# Patient Record
Sex: FEMALE | Race: WHITE | Employment: FULL TIME | URBAN - NONMETROPOLITAN AREA
[De-identification: names, ages, dates, MRNs, and addresses within clinical notes are randomized per-mention and may not be internally consistent; named-entity substitution may affect disease eponyms.]

---

## 2017-06-08 ENCOUNTER — TELEPHONE (OUTPATIENT)
Dept: OBGYN | Facility: OTHER | Age: 27
End: 2017-06-08

## 2017-06-08 DIAGNOSIS — N91.2 AMENORRHEA: Primary | ICD-10-CM

## 2017-06-09 ENCOUNTER — TELEPHONE (OUTPATIENT)
Dept: OBGYN | Facility: OTHER | Age: 27
End: 2017-06-09

## 2017-06-09 DIAGNOSIS — N91.2 AMENORRHEA: ICD-10-CM

## 2017-06-09 LAB — HCG UR QL: POSITIVE

## 2017-06-09 PROCEDURE — 81025 URINE PREGNANCY TEST: CPT | Performed by: OBSTETRICS & GYNECOLOGY

## 2017-06-09 NOTE — TELEPHONE ENCOUNTER
Positive pregnancy test : yes  LMP : approx may 1 GA: approx 5 1/2 weeks  Prenatal vitamins?: Yes, OTC PNV. Will check folate to be sure 800 mcg or more.  Bleeding?: no  Cramping?: no  1-sided pelvic pain?: no  Advised patient to be seen ASAP if any of the above symptoms.  [facility] appt scheduled with Dr. Dean on 6/20 @ 8:50.

## 2017-06-20 ENCOUNTER — PRENATAL OFFICE VISIT (OUTPATIENT)
Dept: OBGYN | Facility: OTHER | Age: 27
End: 2017-06-20
Attending: OBSTETRICS & GYNECOLOGY
Payer: COMMERCIAL

## 2017-06-20 VITALS — SYSTOLIC BLOOD PRESSURE: 118 MMHG | WEIGHT: 171 LBS | DIASTOLIC BLOOD PRESSURE: 74 MMHG | BODY MASS INDEX: 31.28 KG/M2

## 2017-06-20 DIAGNOSIS — F17.200 TOBACCO USE DISORDER: ICD-10-CM

## 2017-06-20 DIAGNOSIS — Z32.01 PREGNANCY TEST POSITIVE: Primary | ICD-10-CM

## 2017-06-20 DIAGNOSIS — O09.891 SUPERVISION OF OTHER HIGH RISK PREGNANCIES, FIRST TRIMESTER: ICD-10-CM

## 2017-06-20 LAB
ALBUMIN UR-MCNC: 10 MG/DL
AMORPH CRY #/AREA URNS HPF: ABNORMAL /HPF
APPEARANCE UR: ABNORMAL
BACTERIA #/AREA URNS HPF: ABNORMAL /HPF
BILIRUB UR QL STRIP: NEGATIVE
COLOR UR AUTO: YELLOW
ERYTHROCYTE [DISTWIDTH] IN BLOOD BY AUTOMATED COUNT: 12.3 % (ref 10–15)
GLUCOSE UR STRIP-MCNC: NEGATIVE MG/DL
HCT VFR BLD AUTO: 43.4 % (ref 35–47)
HGB BLD-MCNC: 15.1 G/DL (ref 11.7–15.7)
HGB UR QL STRIP: NEGATIVE
KETONES UR STRIP-MCNC: NEGATIVE MG/DL
LEUKOCYTE ESTERASE UR QL STRIP: NEGATIVE
MCH RBC QN AUTO: 30.4 PG (ref 26.5–33)
MCHC RBC AUTO-ENTMCNC: 34.8 G/DL (ref 31.5–36.5)
MCV RBC AUTO: 87 FL (ref 78–100)
MUCOUS THREADS #/AREA URNS LPF: PRESENT /LPF
NITRATE UR QL: NEGATIVE
PH UR STRIP: 8 PH (ref 4.7–8)
PLATELET # BLD AUTO: 229 10E9/L (ref 150–450)
RBC # BLD AUTO: 4.97 10E12/L (ref 3.8–5.2)
RBC #/AREA URNS AUTO: 0 /HPF (ref 0–2)
SP GR UR STRIP: 1.02 (ref 1–1.03)
URN SPEC COLLECT METH UR: ABNORMAL
UROBILINOGEN UR STRIP-MCNC: NORMAL MG/DL (ref 0–2)
WBC # BLD AUTO: 9.3 10E9/L (ref 4–11)
WBC #/AREA URNS AUTO: 0 /HPF (ref 0–2)

## 2017-06-20 PROCEDURE — 87591 N.GONORRHOEAE DNA AMP PROB: CPT | Mod: 90 | Performed by: OBSTETRICS & GYNECOLOGY

## 2017-06-20 PROCEDURE — 36415 COLL VENOUS BLD VENIPUNCTURE: CPT | Performed by: OBSTETRICS & GYNECOLOGY

## 2017-06-20 PROCEDURE — 87491 CHLMYD TRACH DNA AMP PROBE: CPT | Mod: 90 | Performed by: OBSTETRICS & GYNECOLOGY

## 2017-06-20 PROCEDURE — 99000 SPECIMEN HANDLING OFFICE-LAB: CPT | Performed by: OBSTETRICS & GYNECOLOGY

## 2017-06-20 PROCEDURE — 76817 TRANSVAGINAL US OBSTETRIC: CPT | Performed by: OBSTETRICS & GYNECOLOGY

## 2017-06-20 PROCEDURE — 85027 COMPLETE CBC AUTOMATED: CPT | Performed by: OBSTETRICS & GYNECOLOGY

## 2017-06-20 PROCEDURE — 99207 ZZC COMPLICATED OB VISIT: CPT | Performed by: OBSTETRICS & GYNECOLOGY

## 2017-06-20 PROCEDURE — 81001 URINALYSIS AUTO W/SCOPE: CPT | Performed by: OBSTETRICS & GYNECOLOGY

## 2017-06-20 PROCEDURE — 88142 CYTOPATH C/V THIN LAYER: CPT | Performed by: OBSTETRICS & GYNECOLOGY

## 2017-06-20 RX ORDER — PRENATAL VIT/IRON FUM/FOLIC AC 27MG-0.8MG
1 TABLET ORAL DAILY
COMMUNITY
End: 2018-08-23

## 2017-06-20 ASSESSMENT — PAIN SCALES - GENERAL: PAINLEVEL: NO PAIN (0)

## 2017-06-20 NOTE — LETTER
[facility] [place]  [address]  [place] [address]  [phone]      June 27, 2017      Arabella GILES Desai  [facility] [address] [address]        Dear Arabella,      Thank you for coming to the [facility]. This letter is to inform you that your pap test was normal. Please call the nurse at [phone] if you have questions pertaining to your results.           Sincerely,    Kayla Dean MD/Karmen SERRANO LPN

## 2017-06-20 NOTE — NURSING NOTE
"Chief Complaint   Patient presents with     Prenatal Care     7 Weeks, 2 Days       Initial /74 (Cuff Size: Adult Regular)  Wt 171 lb (77.6 kg)  BMI 31.28 kg/m2 Estimated body mass index is 31.28 kg/(m^2) as calculated from the following:    Height as of 8/31/16: 5' 2\" (1.575 m).    Weight as of this encounter: 171 lb (77.6 kg).  Medication Reconciliation: bette Gee      "

## 2017-06-20 NOTE — MR AVS SNAPSHOT
After Visit Summary   6/20/2017    Arabella Desai    MRN: [id number]           Patient Information     Date Of Birth          1990        Visit Information        Provider Department      6/20/2017 8:50 AM Kayla Dean MD [facility] [place]        Today's Diagnoses     Pregnancy test positive    -  1      Care Instructions    Pap; Gonorrhea and Chlamydia testing done today.  Lab today.    Return in 2 weeks for a short appointment, and in 4-6 weeks for a long New OB appointment.  Please call [phone] right away if you need to reschedule your long New OB visit. These appointment slots fill quickly.       1st trimester screening blood work and nuchal translucency ultrasound (screen for Down Syndrome, Trisomy 13/18) ultrasound @ 11 2/7-13 6/7 weeks. You will receive a call from ultrasound to schedule this appt. Please call the nurse at [phone] if you are not contacted by 12  weeks with an appointment time.    MSAFP blood test in lab (screen for open neural tube defects) @ approximately 16 weeks. (can not be drawn any earlier than 15 weeks)    Level 2 ultrasound @ 18-22 weeks. You will receive a call from ultrasound to make this appt. Please contact the nurse if you have not been contacted with an appointment time by 16 weeks. If you need to reschedule your nuchal translucency ultrasound or Level 2 ultrasound; please call ultrasound immediately as these appointment times fill quickly.                   Follow-ups after your visit        Your next 10 appointments already scheduled     Aug 21, 2017  2:00 PM CDT   (Arrive by 1:45 PM)   PHYSICAL with MD Augie [facility] [place] [place] ([facility] )    [address] [place] [address]   [phone]              Who to contact     If you have questions or need follow up information about today's clinic visit or your schedule please contact [facility] directly at [phone].  Normal  "or non-critical lab and imaging results will be communicated to you by MyChart, letter or phone within 4 business days after the clinic has received the results. If you do not hear from us within 7 days, please contact the clinic through [employer] or phone. If you have a critical or abnormal lab result, we will notify you by phone as soon as possible.  Submit refill requests through [employer] or call your pharmacy and they will forward the refill request to us. Please allow 3 business days for your refill to be completed.          Additional Information About Your Visit        [employer] Information     Red-rabbit lets you send messages to your doctor, view your test results, renew your prescriptions, schedule appointments and more. To sign up, go to [email or web address] . Click on \"Log in\" on the left side of the screen, which will take you to the Welcome page. Then click on \"Sign up Now\" on the right side of the page.     You will be asked to enter the access code listed below, as well as some personal information. Please follow the directions to create your username and password.     Your access code is: [email or web address]  Expires: 2017  9:05 AM     Your access code will  in 90 days. If you need help or a new code, please call your [place] clinic or [phone].        Care EveryWhere ID     This is your Care EveryWhere ID. This could be used by other organizations to access your [place] medical records  [id number]        Your Vitals Were     Last Period BMI (Body Mass Index)                2017 (Approximate) 31.28 kg/m2           Blood Pressure from Last 3 Encounters:   17 118/74   16 118/72   16 104/70    Weight from Last 3 Encounters:   17 171 lb (77.6 kg)   16 150 lb (68 kg)   16 150 lb (68 kg)              We Performed the Following     US OB (Clinic Only)        Primary Care Provider Office Phone # Fax #    NITO Monroy [phone]       " [facility] [address] [place] [address]        Thank you!     Thank you for choosing [facility]  for your care. Our goal is always to provide you with excellent care. Hearing back from our patients is one way we can continue to improve our services. Please take a few minutes to complete the written survey that you may receive in the mail after your visit with us. Thank you!             Your Updated Medication List - Protect others around you: Learn how to safely use, store and throw away your medicines at www.disposemymeds.org.          This list is accurate as of: 6/20/17  9:05 AM.  Always use your most recent med list.                   Brand Name Dispense Instructions for use    prenatal multivitamin  plus iron 27-0.8 MG Tabs per tablet      Take 1 tablet by mouth daily

## 2017-06-20 NOTE — PROGRESS NOTES
Here for doc of dates and viability  /74 (Cuff Size: Adult Regular)  Wt 171 lb (77.6 kg)  BMI 31.28 kg/m2      Uterus: normal  Gest. Sac: reg  Yolk Sac: p  Fetal Pole: 6w6d  FHT: +  Fluid: normal  Adnexa: no masses  Gest Age: 6w6d  Sc = dates  Done by Kayla Dean MD    A: IUP    P: rto 2 wks  rto NOB  11-13 wk NT etc.  Pap,gc,ct done  Hp,mscc with micro      Greater than  25 minutes were spent counseling this patient face to face in addition to the ultrasound.

## 2017-06-20 NOTE — PATIENT INSTRUCTIONS
Pap; Gonorrhea and Chlamydia testing done today.  Lab today.    Return in 2 weeks for a short appointment, and in 4-6 weeks for a long New OB appointment.  Please call [phone] right away if you need to reschedule your long New OB visit. These appointment slots fill quickly.       1st trimester screening blood work and nuchal translucency ultrasound (screen for Down Syndrome, Trisomy 13/18) ultrasound @ 11 2/7-13 6/7 weeks. You will receive a call from ultrasound to schedule this appt. Please call the nurse at [phone] if you are not contacted by 12  weeks with an appointment time.    MSAFP blood test in lab (screen for open neural tube defects) @ approximately 16 weeks. (can not be drawn any earlier than 15 weeks)    Level 2 ultrasound @ 18-22 weeks. You will receive a call from ultrasound to make this appt. Please contact the nurse if you have not been contacted with an appointment time by 16 weeks. If you need to reschedule your nuchal translucency ultrasound or Level 2 ultrasound; please call ultrasound immediately as these appointment times fill quickly.

## 2017-06-21 LAB
C TRACH DNA SPEC QL NAA+PROBE: NORMAL
N GONORRHOEA DNA SPEC QL NAA+PROBE: NORMAL
SPECIMEN SOURCE: NORMAL
SPECIMEN SOURCE: NORMAL

## 2017-06-23 ASSESSMENT — ANXIETY QUESTIONNAIRES
1. FEELING NERVOUS, ANXIOUS, OR ON EDGE: SEVERAL DAYS
2. NOT BEING ABLE TO STOP OR CONTROL WORRYING: NOT AT ALL
7. FEELING AFRAID AS IF SOMETHING AWFUL MIGHT HAPPEN: NOT AT ALL
GAD7 TOTAL SCORE: 2
5. BEING SO RESTLESS THAT IT IS HARD TO SIT STILL: NOT AT ALL
IF YOU CHECKED OFF ANY PROBLEMS ON THIS QUESTIONNAIRE, HOW DIFFICULT HAVE THESE PROBLEMS MADE IT FOR YOU TO DO YOUR WORK, TAKE CARE OF THINGS AT HOME, OR GET ALONG WITH OTHER PEOPLE: NOT DIFFICULT AT ALL
3. WORRYING TOO MUCH ABOUT DIFFERENT THINGS: NOT AT ALL
6. BECOMING EASILY ANNOYED OR IRRITABLE: SEVERAL DAYS

## 2017-06-23 ASSESSMENT — PATIENT HEALTH QUESTIONNAIRE - PHQ9: 5. POOR APPETITE OR OVEREATING: NOT AT ALL

## 2017-06-24 ASSESSMENT — PATIENT HEALTH QUESTIONNAIRE - PHQ9: SUM OF ALL RESPONSES TO PHQ QUESTIONS 1-9: 3

## 2017-06-24 ASSESSMENT — ANXIETY QUESTIONNAIRES: GAD7 TOTAL SCORE: 2

## 2017-06-27 LAB
COPATH REPORT: NORMAL
PAP: NORMAL

## 2017-07-10 ENCOUNTER — PRENATAL OFFICE VISIT (OUTPATIENT)
Dept: OBGYN | Facility: OTHER | Age: 27
End: 2017-07-10
Attending: OBSTETRICS & GYNECOLOGY
Payer: COMMERCIAL

## 2017-07-10 VITALS
BODY MASS INDEX: 31.28 KG/M2 | WEIGHT: 170 LBS | HEIGHT: 62 IN | DIASTOLIC BLOOD PRESSURE: 66 MMHG | SYSTOLIC BLOOD PRESSURE: 118 MMHG

## 2017-07-10 DIAGNOSIS — Z23 NEED FOR VACCINATION: ICD-10-CM

## 2017-07-10 DIAGNOSIS — F17.200 SMOKES AND MOTIVATED TO QUIT: ICD-10-CM

## 2017-07-10 DIAGNOSIS — Z34.01 ENCOUNTER FOR SUPERVISION OF NORMAL FIRST PREGNANCY IN FIRST TRIMESTER: ICD-10-CM

## 2017-07-10 DIAGNOSIS — O09.891 SUPERVISION OF OTHER HIGH RISK PREGNANCIES, FIRST TRIMESTER: Primary | ICD-10-CM

## 2017-07-10 DIAGNOSIS — H10.029 PINK EYE, UNSPECIFIED LATERALITY: ICD-10-CM

## 2017-07-10 DIAGNOSIS — F17.200 TOBACCO USE DISORDER: ICD-10-CM

## 2017-07-10 PROCEDURE — 99207 ZZC FIRST OB VISIT: CPT | Mod: 25 | Performed by: OBSTETRICS & GYNECOLOGY

## 2017-07-10 PROCEDURE — 90732 PPSV23 VACC 2 YRS+ SUBQ/IM: CPT | Performed by: OBSTETRICS & GYNECOLOGY

## 2017-07-10 PROCEDURE — 76817 TRANSVAGINAL US OBSTETRIC: CPT | Performed by: OBSTETRICS & GYNECOLOGY

## 2017-07-10 PROCEDURE — 90471 IMMUNIZATION ADMIN: CPT | Performed by: OBSTETRICS & GYNECOLOGY

## 2017-07-10 RX ORDER — NICOTINE 21 MG/24HR
1 PATCH, TRANSDERMAL 24 HOURS TRANSDERMAL EVERY 24 HOURS
Qty: 30 PATCH | Refills: 1 | Status: SHIPPED | OUTPATIENT
Start: 2017-07-10 | End: 2018-02-14

## 2017-07-10 RX ORDER — GENTAMICIN SULFATE 3 MG/ML
1 SOLUTION/ DROPS OPHTHALMIC EVERY 4 HOURS
COMMUNITY
End: 2017-07-10

## 2017-07-10 RX ORDER — GENTAMICIN SULFATE 3 MG/ML
1 SOLUTION/ DROPS OPHTHALMIC EVERY 4 HOURS
Qty: 5 ML | Refills: 0 | Status: SHIPPED | OUTPATIENT
Start: 2017-07-10 | End: 2017-08-08

## 2017-07-10 NOTE — PROGRESS NOTES
Immunization History   Administered Date(s) Administered     DTAP (<7y) 1990, 1990, 1990, 08/14/1991, 02/06/1995     HPVQuadrivalent 06/28/2007, 08/28/2007, 12/24/2007     HepB-Peds 10/11/1999, 11/11/1999, 03/12/2000     Influenza Vaccine IM 3yrs+ 4 Valent IIV4 10/21/2013     Influenza vaccine ages 6-35 months 09/12/2011     MMR 05/09/1991, 06/04/2002     Meningococcal (Menactra ) 06/28/2007     Pneumococcal 23 valent 07/10/2017     Poliovirus, inactivated (IPV) 1990, 1990, 08/14/1991, 02/06/1995     TD (ADULT, 7+) 06/04/2002     TDAP Vaccine (Boostrix) 04/03/2012, 04/26/2016     Varicella 10/11/1999     Pneumovax done. Declines Quitline referral.    12 Week Visit    Patient education provided on the following:  Benefits of breastfeeding  Importance of early skin-to-skin contact    We reviewed the [place] [employer] Prenatal Toolkit in the [facility] Resource Book.  Patient questions and concerns addressed and reviewed. Support and encouragement provided.    VERONICA CEDEÑO

## 2017-07-10 NOTE — PATIENT INSTRUCTIONS
1st trimester screening blood work and nuchal translucency ultrasound (screen for Down Syndrome, Trisomy 13/18) ultrasound @ 11 2/7-13 6/7 weeks. Scheduled for 7/24/17.    MSAFP blood test in lab (screen for open neural tube defects) @ approximately 16 weeks. (can not be drawn any earlier than 15 weeks)    Level 2 ultrasound @ 18-22 weeks. Scheduled for 9/26/17. If you need to reschedule your nuchal translucency ultrasound or Level 2 ultrasound; please call ultrasound immediately as these appointment times fill quickly.     Pneumonia vaccine recommended now.  Tdap vaccine at 27 weeks.  Flu shot in fall.    Return to clinic in 4 weeks.    Try to quit smoking. Referral to Quitline Counselor offered.

## 2017-07-10 NOTE — PROGRESS NOTES
"NEW OB VISIT  Arabella Desai is a 27 year old  at 10w0d presenting for a new ob visit.      Currently taking PNV? Gentamycin eye and pnv  Other meds: n    MEDICAL HISTORY:  Diabetes: n  Hypertension: n  Heart Disease: n  Autoimmune disorder: n  Kidney Disease/UTI: n  Neurologic Disease/Epilepsy: n  Psychiatric Disease: n  Depression/Postpartum Depression: with wily  Varicositites/Phlebitis: n  Hepatitis/Liver Disease: n  Thyroid Dysfunction: n  Trauma/Violence: n  History of Blood Transfusion: n  Tobacco Use: y  Alcohol Use: n  Illicit/Recreational Drugs: n  D (Rh Sensitized): n  Pulmonary Disease (TB/Asthma): n  Drug/Latex Allergies/Reactions: rash with latex  Breast: n  GYN Surgery: n  Operations/Hospitalizations: tonsils  Anesthetic Complications: n  History of Abnormal Pap: n  Uterine Anomalies/ARSH: n  Infertility: n  ART Treatment: n  Relevant Family History: n   Other/Comments: n    INFECTION HISTORY:  Live with someone with TB or exposed to TB: father with infusions  Patient or Partner has history of Genital Herpes: n  Rash or viral illness or fever since LMP: n pink eye?  Hepatitis B or C: n  History of STI (Gonorrhea, Chlamydia, HPV, HIV, Syphilis): n  Zika exposure n   Other: n  Cats y    BABY DOC: AMc             Breast feeding: y  RD y      IMMUNIZATION HISTORY:  Chicken Pox: y  Flu Vaccine:  In fall  Pneumococcal if smoker or RAD:  y  Tdap: in 3  Gardasil: n  Other/comments: n    FAMILY HISTORY  Diabetes: mgm  Hypertension: n  CVA/Stroke: n  Lupus: n  Cancers: Breast  n ovarian n,colon n,uterine: n           Genetics Screening/Teratology Counseling:  Includes Patient, Baby's Father, or anyone in either family with:  Patient's age 35 years or older as of estimated date of delivery:  n  Thalassemia: MCV less than 80: n  Neural Tube defects: n  Congenital Heart Defects: n  Down syndrome: fob niece,fob pu \"special\"  Gage-Sachs: n  Canavan Disease: n  Familial Dysautonomia: n  Sickle Cell Disease " "or Trait: n  Hemophilia or other blood disorders: n  Muscular Dystrophy: n  Cystic Fibrosis: n  [place]'s Chorea: n  Mental Retardation or Autism: n  Other genetic or chromosomal disorders: n  Maternal Metabolic Disorder (Type 1 DM, PKU): n  Patient or baby's father with birth defects not listed above: n  Recurrent pregnancy loss or stillbirth: n  Medications (Supplements, drugs)/ Illicit/ Recreational drugs/ Alcohol since LMP: n  Other/Comments: n    Review Of Systems: \"pink eye\"  C:     NEGATIVE for fever, chills, change in weight  I:       NEGATIVE for worrisome rashes, moles or lesions  E:     NEGATIVE for vision changes or irritation  E/M: NEGATIVE for ear, mouth and throat problems  R:     NEGATIVE for significant cough or SOB  CV:   NEGATIVE for chest pain, palpitations or peripheral edema  GI:     NEGATIVE for unusual nausea, abdominal pain, heartburn, or change in bowel   :   NEGATIVE for frequency, dysuria, hematuria, vaginal discharge or bleeding  M:     NEGATIVE for significant arthralgias or myalgia  N:      NEGATIVE for weakness, dizziness or paresthesias  E:      NEGATIVE for temperature intolerance, skin/hair changes  P:      NEGATIVE for changes in mood or affect.     PHYSICAL EXAM:   /66  Ht 5' 2\" (1.575 m)  Wt 170 lb (77.1 kg)  LMP 05/01/2017 (Approximate)  BMI 31.09 kg/m2   BMI: Body mass index is 31.09 kg/(m^2).  Constitutional: healthy, alert and no distress  Head: Normocephalic. No masses, lesions, tenderness or abnormalities  Neck: Neck supple. Trachea midline. No adenopathy. Thyroid symmetric, normal size.   Cardiovascular: RRR.   Respiratory: lungs clear   Breast: Breasts reveal mild symmetric fibrocystic densities, but there are no dominant, discrete, fixed or suspicious masses found.  Gastrointestinal: Abdomen soft, non-tender, non-distended. No masses, organomegaly.  Pelvic:  Vulva:  No external lesions, normal female hair distribution, no inguinal adenopathy.    Urethra:  " Midline, non-tender, well supported, no discharge  Vagina:  Moist, pink, no abnormal discharge, no lesions  Uterus:   gravid , non-tender  Ovaries:  No masses appreciated  Rectal Exam: deferred    Musculoskeletal: extremities normal  Skin: no suspicious lesions or rashes  Psychiatric: Affect appropriate, cooperative,mentation appears normal.     Risk assessment done. Level is   low    ASSESSMENT:   IUP    PLAN:  Prenatal labs done  11-13 weeks 1st trimester NT/Bloodwork scheduled  16 wk MSAFP  Cell free DNA discussed  Level II Ultrasound at 20 weeks   Tdap at 27 weeks  Check MIIC  Flu shot in fall  RTO 4 wks    Greater than 25 were spent in face to face counseling and interview by me for this initial new ob visit.  Kayla Dean MD

## 2017-07-10 NOTE — MR AVS SNAPSHOT
After Visit Summary   7/10/2017    Arabella Desai    MRN: [id number]           Patient Information     Date Of Birth          1990        Visit Information        Provider Department      7/10/2017 9:00 AM Kayla Dean MD [facility]        Today's Diagnoses     Supervision of other high risk pregnancies, first trimester    -  1    Encounter for supervision of normal first pregnancy in first trimester        Smokes and motivated to quit        Need for vaccination          Care Instructions    1st trimester screening blood work and nuchal translucency ultrasound (screen for Down Syndrome, Trisomy 13/18) ultrasound @ 11 2/7-13 6/7 weeks. Scheduled for 7/24/17.    MSAFP blood test in lab (screen for open neural tube defects) @ approximately 16 weeks. (can not be drawn any earlier than 15 weeks)    Level 2 ultrasound @ 18-22 weeks. Scheduled for 9/26/17. If you need to reschedule your nuchal translucency ultrasound or Level 2 ultrasound; please call ultrasound immediately as these appointment times fill quickly.     Pneumonia vaccine recommended now.  Tdap vaccine at 27 weeks.  Flu shot in fall.    Return to clinic in 4 weeks.    Try to quit smoking. Referral to Quitline Counselor offered.                  Follow-ups after your visit        Your next 10 appointments already scheduled     Jul 24, 2017  1:00 PM CDT   Radiology with [facility] ULTRASOUND 2   [facility] Ultrasound ([facility] )    [address] [place] [address]   [phone]            Aug 21, 2017  2:00 PM CDT   (Arrive by 1:45 PM)   [facility]YS[facility]   [facility] )    [facility] [address]   [phone]            Sep 26, 2017  8:00 AM CDT   Radiology with HI ULTRASOUND 2   [facility] Ultrasound ([facility] )    [address] [place] [address]   [phone]              Who to contact     If you have questions or need  "follow up information about today's clinic visit or your schedule please contact [facility] directly at [phone].  Normal or non-critical lab and imaging results will be communicated to you by MyChart, letter or phone within 4 business days after the clinic has received the results. If you do not hear from us within 7 days, please contact the clinic through [email or web address] or phone. If you have a critical or abnormal lab result, we will notify you by phone as soon as possible.  Submit refill requests through [email or web address] or call your pharmacy and they will forward the refill request to us. Please allow 3 business days for your refill to be completed.          Additional Information About Your Visit        [email or web address] Information     [email or web address] lets you send messages to your doctor, view your test results, renew your prescriptions, schedule appointments and more. To sign up, go to [email or web address] . Click on \"Log in\" on the left side of the screen, which will take you to the Welcome page. Then click on \"Sign up Now\" on the right side of the page.     You will be asked to enter the access code listed below, as well as some personal information. Please follow the directions to create your username and password.     Your access code is: [email or web address]  Expires: 2017  9:05 AM     Your access code will  in 90 days. If you need help or a new code, please call your [place] clinic or [phone].        Care EveryWhere ID     This is your Care EveryWhere ID. This could be used by other organizations to access your [place] medical records  [id number]        Your Vitals Were     Height Last Period BMI (Body Mass Index)             5' 2\" (1.575 m) 2017 (Approximate) 31.09 kg/m2          Blood Pressure from Last 3 Encounters:   07/10/17 118/66   17 118/74   16 118/72    Weight from Last 3 Encounters:   07/10/17 170 lb (77.1 kg)   17 171 lb (77.6 kg)   16 150 lb (68 kg)            "   We Performed the Following     ADMIN: Vaccine, Initial (05821)     Pneumococcal vaccine 23 valent PPSV23  (Pneumovax) [51923]     US OB (Clinic Only)        Primary Care Provider Office Phone # Fax #    Katherine NITO Dunaway [phone]       [facility] [address]  [place] [address]        Equal Access to Services     [facility] Center[facility]YAN : Hadii aad ku hadasho Soomaali, waaxda luqadaha, qaybta kaalmada adeegyada, waxay idiin hayaan adeeg kharash la'aan . So [facility] [phone].    ATENCIÓN: Si habla español, tiene a boston disposición servicios gratuitos de asistencia lingüística. Kady al [phone].    We comply with applicable federal civil rights laws and [place] laws. We do not discriminate on the basis of race, color, national origin, age, disability sex, sexual orientation or gender identity.            Thank you!     Thank you for choosing [facility] [place]  for your care. Our goal is always to provide you with excellent care. Hearing back from our patients is one way we can continue to improve our services. Please take a few minutes to complete the written survey that you may receive in the mail after your visit with us. Thank you!             Your Updated Medication List - Protect others around you: Learn how to safely use, store and throw away your medicines at www.disposemymeds.org.          This list is accurate as of: 7/10/17  9:43 AM.  Always use your most recent med list.                   Brand Name Dispense Instructions for use Diagnosis    gentamicin 0.3 % ophthalmic solution    GARAMYCIN     Place 1 drop into both eyes every 4 hours        prenatal multivitamin  plus iron 27-0.8 MG Tabs per tablet      Take 1 tablet by mouth daily

## 2017-07-24 ENCOUNTER — HOSPITAL ENCOUNTER (OUTPATIENT)
Dept: ULTRASOUND IMAGING | Facility: HOSPITAL | Age: 27
Discharge: HOME | End: 2017-07-24
Attending: OBSTETRICS & GYNECOLOGY | Admitting: OBSTETRICS & GYNECOLOGY
Payer: COMMERCIAL

## 2017-07-24 PROCEDURE — 36415 COLL VENOUS BLD VENIPUNCTURE: CPT

## 2017-07-24 PROCEDURE — 76813 OB US NUCHAL MEAS 1 GEST: CPT | Mod: TC

## 2017-07-25 PROBLEM — R93.89 ABNORMAL ULTRASOUND: Status: ACTIVE | Noted: 2017-07-25

## 2017-07-28 DIAGNOSIS — R93.89 ABNORMAL ULTRASOUND: ICD-10-CM

## 2017-07-28 DIAGNOSIS — O28.3 INCREASED NUCHAL TRANSLUCENCY SPACE ON FETAL ULTRASOUND: ICD-10-CM

## 2017-07-28 DIAGNOSIS — O28.3 INCREASED NUCHAL TRANSLUCENCY SPACE ON FETAL ULTRASOUND: Primary | ICD-10-CM

## 2017-07-28 PROCEDURE — 99000 SPECIMEN HANDLING OFFICE-LAB: CPT | Performed by: OBSTETRICS & GYNECOLOGY

## 2017-07-28 PROCEDURE — 36415 COLL VENOUS BLD VENIPUNCTURE: CPT | Performed by: OBSTETRICS & GYNECOLOGY

## 2017-08-07 ENCOUNTER — OFFICE VISIT (OUTPATIENT)
Dept: FAMILY MEDICINE | Facility: OTHER | Age: 27
End: 2017-08-07
Attending: FAMILY MEDICINE
Payer: COMMERCIAL

## 2017-08-07 VITALS
TEMPERATURE: 97.9 F | WEIGHT: 162 LBS | DIASTOLIC BLOOD PRESSURE: 58 MMHG | HEART RATE: 95 BPM | HEIGHT: 62 IN | OXYGEN SATURATION: 98 % | SYSTOLIC BLOOD PRESSURE: 108 MMHG | BODY MASS INDEX: 29.81 KG/M2

## 2017-08-07 DIAGNOSIS — H10.13 ALLERGIC CONJUNCTIVITIS, BILATERAL: Primary | ICD-10-CM

## 2017-08-07 DIAGNOSIS — R05.9 COUGH: ICD-10-CM

## 2017-08-07 PROCEDURE — 99213 OFFICE O/P EST LOW 20 MIN: CPT | Performed by: FAMILY MEDICINE

## 2017-08-07 RX ORDER — OLOPATADINE HYDROCHLORIDE 1 MG/ML
1 SOLUTION/ DROPS OPHTHALMIC 2 TIMES DAILY
Qty: 1 BOTTLE | Refills: 3 | Status: SHIPPED | OUTPATIENT
Start: 2017-08-07 | End: 2017-09-06

## 2017-08-07 RX ORDER — LORATADINE 10 MG/1
10 TABLET ORAL DAILY
Qty: 30 TABLET | Refills: 3 | Status: SHIPPED | OUTPATIENT
Start: 2017-08-07 | End: 2017-09-06

## 2017-08-07 ASSESSMENT — PATIENT HEALTH QUESTIONNAIRE - PHQ9
SUM OF ALL RESPONSES TO PHQ QUESTIONS 1-9: 2
5. POOR APPETITE OR OVEREATING: NOT AT ALL

## 2017-08-07 ASSESSMENT — ANXIETY QUESTIONNAIRES
5. BEING SO RESTLESS THAT IT IS HARD TO SIT STILL: NOT AT ALL
2. NOT BEING ABLE TO STOP OR CONTROL WORRYING: NOT AT ALL
7. FEELING AFRAID AS IF SOMETHING AWFUL MIGHT HAPPEN: NOT AT ALL
GAD7 TOTAL SCORE: 0
6. BECOMING EASILY ANNOYED OR IRRITABLE: NOT AT ALL
1. FEELING NERVOUS, ANXIOUS, OR ON EDGE: NOT AT ALL
3. WORRYING TOO MUCH ABOUT DIFFERENT THINGS: NOT AT ALL

## 2017-08-07 ASSESSMENT — PAIN SCALES - GENERAL: PAINLEVEL: NO PAIN (1)

## 2017-08-07 NOTE — PROGRESS NOTES
SUBJECTIVE:  Arabella is a 27 year old female who comes in today for eye symptoms for past month, burning, dry, some crusting in the morning.  Has tried Gentamicin without relief.  Had similar symptoms last month.  Started this time at outdoor wedding, camping.  Has been coughing this past week.  Productive, phlegm.  Some wheezing at night.  No fevers.  No rash.  No rhinorrhea, but some post nasal drip.      Current Outpatient Prescriptions   Medication     loratadine (CLARITIN) 10 MG tablet     olopatadine (PATANOL) 0.1 % ophthalmic solution     nicotine (NICODERM CQ) 14 MG/24HR 24 hr patch     gentamicin (GARAMYCIN) 0.3 % ophthalmic solution     Prenatal Vit-Fe Fumarate-FA (PRENATAL MULTIVITAMIN  PLUS IRON) 27-0.8 MG TABS per tablet     [DISCONTINUED] docusate sodium (COLACE) 100 MG capsule     No current facility-administered medications for this visit.         Allergies   Allergen Reactions     Latex Rash       Past Medical History:   Diagnosis Date     Chronic interstitial cystitis 8/12/2011     Tobacco use disorder 5/17/2012     Past Surgical History:   Procedure Laterality Date     CYSTOSCOPY       Social History     Social History     Marital status:      Spouse name: N/A     Number of children: N/A     Years of education: N/A     Occupational History     [employer]      Social History Main Topics     Smoking status: Former Smoker     Packs/day: 0.50     Types: Cigarettes     Smokeless tobacco: Never Used      Comment: RX for patches done. declines Quitline referral     Alcohol use No      Comment: occasional     Drug use: No     Sexual activity: Yes     Partners: Male     Other Topics Concern      Service No     Blood Transfusions Yes     Permits if needed     Caffeine Concern No     Occupational Exposure No     Hobby Hazards No     Sleep Concern No     Stress Concern No     Weight Concern No     Special Diet No     Back Care No     Exercise No     Seat Belt Yes     Self-Exams Yes      "Parent/Sibling W/ Cabg, Mi Or Angioplasty Before 65f 55m? No     Social History Narrative       ROS:  General: negative for, fever, chills  Skin: negative for, rash  Eyes: positive for as above, redness, tearing, itching, negative for, visual blurring, eye pain  ENT: negative for, ear pain bilaterally, sinus congestion, sore throat  Resp: Cough- as above  CV: negative for and chest pain  GI: negative    OBJECTIVE:  Vitals:    08/07/17 1359   BP: 108/58   BP Location: Left arm   Patient Position: Chair   Cuff Size: Adult Regular   Pulse: 95   Temp: 97.9  F (36.6  C)   TempSrc: Tympanic   SpO2: 98%   Weight: 162 lb (73.5 kg)   Height: 5' 1.5\" (1.562 m)     GENERAL APPEARANCE: healthy, alert and no distress  EYES: PERRL, EOMI, conjunctiva injected, right slightly more than left; normal fundoscopic exam  HENT: ear canals and TM's normal and nose and mouth without ulcers or lesions  NECK: no adenopathy, no asymmetry, masses, or scars and thyroid normal to palpation  RESP: lungs clear to auscultation - no rales, rhonchi or wheezes; occasional dry cough  CV: regular rates and rhythm, normal S1 S2, no S3 or S4 and no murmur, click or rub -  SKIN: no suspicious lesions or rashes  PSYCH: mentation appears normal. and affect normal/bright    ASSESSMENT/ORDERS:    ICD-10-CM    1. Allergic conjunctivitis, bilateral H10.13 loratadine (CLARITIN) 10 MG tablet     olopatadine (PATANOL) 0.1 % ophthalmic solution   2. Cough R05 loratadine (CLARITIN) 10 MG tablet     PLAN:  Seems to be more allergy/environmental.  Did not respond to antibiotic drops.  See below.    Patient Instructions   Take Claritin nightly.  Use eye drops twice daily.  Can use artificial tears or gel to lubricate as needed as well.  Saline sinus rinses.  Call if not improving.        Anayeli San    "

## 2017-08-07 NOTE — NURSING NOTE
"Chief Complaint   Patient presents with     Eye Problem      Both eyes. How long 1 month. Has used gentamycin drops. 1/10. Burning sensation. Dry eyes. Gets goopy/crusty eyes in the morning.      Cough     1 week. no medication for this. 1/10. Coughing up mucus and flem.        Initial /58 (BP Location: Left arm, Patient Position: Chair, Cuff Size: Adult Regular)  Pulse 95  Temp 97.9  F (36.6  C) (Tympanic)  Ht 5' 1.5\" (1.562 m)  Wt 162 lb (73.5 kg)  LMP 05/01/2017 (Approximate)  SpO2 98%  BMI 30.11 kg/m2 Estimated body mass index is 30.11 kg/(m^2) as calculated from the following:    Height as of this encounter: 5' 1.5\" (1.562 m).    Weight as of this encounter: 162 lb (73.5 kg).  Medication Reconciliation: complete   Arpita Holly LPN    "

## 2017-08-07 NOTE — PATIENT INSTRUCTIONS
Take Claritin nightly.  Use eye drops twice daily.  Can use artificial tears or gel to lubricate as needed as well.  Saline sinus rinses.  Call if not improving.

## 2017-08-08 ENCOUNTER — PRENATAL OFFICE VISIT (OUTPATIENT)
Dept: OBGYN | Facility: OTHER | Age: 27
End: 2017-08-08
Attending: OBSTETRICS & GYNECOLOGY
Payer: COMMERCIAL

## 2017-08-08 VITALS — WEIGHT: 169 LBS | BODY MASS INDEX: 31.42 KG/M2 | SYSTOLIC BLOOD PRESSURE: 120 MMHG | DIASTOLIC BLOOD PRESSURE: 78 MMHG

## 2017-08-08 DIAGNOSIS — R93.89 ABNORMAL ULTRASOUND: ICD-10-CM

## 2017-08-08 DIAGNOSIS — O09.891 SUPERVISION OF OTHER HIGH RISK PREGNANCIES, FIRST TRIMESTER: Primary | ICD-10-CM

## 2017-08-08 PROBLEM — J30.1 CHRONIC SEASONAL ALLERGIC RHINITIS DUE TO POLLEN: Status: ACTIVE | Noted: 2017-08-08

## 2017-08-08 PROCEDURE — 99207 ZZC PRENATAL VISIT: CPT | Performed by: OBSTETRICS & GYNECOLOGY

## 2017-08-08 ASSESSMENT — ANXIETY QUESTIONNAIRES: GAD7 TOTAL SCORE: 0

## 2017-08-08 NOTE — MR AVS SNAPSHOT
"              After Visit Summary   8/8/2017    Arabella Desai    MRN: [id number]           Patient Information     Date Of Birth          1990        Visit Information        Provider Department      8/8/2017 8:30 AM Kayla Dean MD [facility]        Today's Diagnoses     Supervision of other high risk pregnancies, first trimester    -  1    Abnormal ultrasound          Care Instructions    MSAFP in lab at 16 weeks.  Return to clinic in 4 weeks.            Follow-ups after your visit        Your next 10 appointments already scheduled     Sep 06, 2017  8:50 AM CDT   (Arrive by 8:35 AM)   ESTABLISHED PRENATAL with Kayla Dean MD   [facility] ([facility] )    [address] [place] [id number] [phone]            Sep 26, 2017  8:00 AM CDT   Radiology with HI ULTRASOUND 2   HI Ultrasound ([facility] )    [address] [place] [address]   [phone]              Who to contact     If you have questions or need follow up information about today's clinic visit or your schedule please contact [facility] directly at [phone].  Normal or non-critical lab and imaging results will be communicated to you by Meetricshart, letter or phone within 4 business days after the clinic has received the results. If you do not hear from us within 7 days, please contact the clinic through Meetricshart or phone. If you have a critical or abnormal lab result, we will notify you by phone as soon as possible.  Submit refill requests through [employer] or call your pharmacy and they will forward the refill request to us. Please allow 3 business days for your refill to be completed.          Additional Information About Your Visit        Meetricshart Information     [employer] lets you send messages to your doctor, view your test results, renew your prescriptions, schedule appointments and more. To sign up, go to [email or web address] . Click on \"Log " "in\" on the left side of the screen, which will take you to the Welcome page. Then click on \"Sign up Now\" on the right side of the page.     You will be asked to enter the access code listed below, as well as some personal information. Please follow the directions to create your username and password.     Your access code is: [email or web address]  Expires: 2017  9:05 AM     Your access code will  in 90 days. If you need help or a new code, please call your [place] clinic or [phone].        Care EveryWhere ID     This is your Care EveryWhere ID. This could be used by other organizations to access your [place] medical records  [id number]        Your Vitals Were     Last Period BMI (Body Mass Index)                2017 (Approximate) 31.42 kg/m2           Blood Pressure from Last 3 Encounters:   17 120/78   17 108/58   07/10/17 118/66    Weight from Last 3 Encounters:   17 169 lb (76.7 kg)   17 162 lb (73.5 kg)   07/10/17 170 lb (77.1 kg)              Today, you had the following     No orders found for display       Primary Care Provider Office Phone # Fax #    NITO Monroy [phone]       [facility] [address] [address]        Equal Access to Services     [facility]: Hadii aad ku hadasho Soomaali, waaxda luqadaha, qaybta kaalmada adeegyada, fe briggs hayalber masters . So [facility] [phone].    ATENCIÓN: Si habla español, tiene a boston disposición servicios gratuitos de asistencia lingüística. Llame al [phone].    We comply with applicable federal civil rights laws and [place] laws. We do not discriminate on the basis of race, color, national origin, age, disability sex, sexual orientation or gender identity.            Thank you!     Thank you for choosing [facility]  for your care. Our goal is always to provide you with excellent care. Hearing back from our patients is one way we can " continue to improve our services. Please take a few minutes to complete the written survey that you may receive in the mail after your visit with us. Thank you!             Your Updated Medication List - Protect others around you: Learn how to safely use, store and throw away your medicines at www.disposemymeds.org.          This list is accurate as of: 8/8/17  9:19 AM.  Always use your most recent med list.                   Brand Name Dispense Instructions for use Diagnosis    loratadine 10 MG tablet    CLARITIN    30 tablet    Take 1 tablet (10 mg) by mouth daily    Allergic conjunctivitis, bilateral, Cough       nicotine 14 MG/24HR 24 hr patch    NICODERM CQ    30 patch    Place 1 patch onto the skin every 24 hours    Smokes and motivated to quit       olopatadine 0.1 % ophthalmic solution    PATANOL    1 Bottle    Place 1 drop into both eyes 2 times daily    Allergic conjunctivitis, bilateral       prenatal multivitamin plus iron 27-0.8 MG Tabs per tablet      Take 1 tablet by mouth daily

## 2017-08-11 LAB — NON INVASIVE PRENATAL TEST CELL FREE DNA: NORMAL

## 2017-09-06 ENCOUNTER — PRENATAL OFFICE VISIT (OUTPATIENT)
Dept: OBGYN | Facility: OTHER | Age: 27
End: 2017-09-06
Attending: OBSTETRICS & GYNECOLOGY
Payer: COMMERCIAL

## 2017-09-06 VITALS — SYSTOLIC BLOOD PRESSURE: 110 MMHG | WEIGHT: 167 LBS | DIASTOLIC BLOOD PRESSURE: 60 MMHG | BODY MASS INDEX: 31.04 KG/M2

## 2017-09-06 DIAGNOSIS — O28.3 INCREASED NUCHAL TRANSLUCENCY SPACE ON FETAL ULTRASOUND: ICD-10-CM

## 2017-09-06 DIAGNOSIS — Z34.01 ENCOUNTER FOR SUPERVISION OF NORMAL FIRST PREGNANCY IN FIRST TRIMESTER: Primary | ICD-10-CM

## 2017-09-06 PROCEDURE — 99207 ZZC PRENATAL VISIT: CPT | Performed by: OBSTETRICS & GYNECOLOGY

## 2017-09-06 NOTE — MR AVS SNAPSHOT
After Visit Summary   9/6/2017    Arabella Desai    MRN: [id number]           Patient Information     Date Of Birth          1990        Visit Information        Provider Department      9/6/2017 8:50 AM Kayla Dean MD [facility]        Today's Diagnoses     Encounter for supervision of normal first pregnancy in first trimester    -  1    Increased nuchal translucency space on fetal ultrasound          Care Instructions    Return to clinic in 4 weeks.  If you think your bag of water is broken; have bleeding like a period; think you are in labor; or are worried about your baby's movement, please call the labor and delivery unit at [phone].            Follow-ups after your visit        Your next 10 appointments already scheduled     Sep 26, 2017  8:00 AM CDT   Radiology with HI ULTRASOUND 2   HI Ultrasound ([facility] )    [address] [place] [id number] [phone]            Oct 03, 2017  9:10 AM CDT   (Arrive by 8:55 AM)   ESTABLISHED PRENATAL with Kayla Dean MD   [facility] ([facility] )    [address] [place] [id number] [phone]              Who to contact     If you have questions or need follow up information about today's clinic visit or your schedule please contact [facility] directly at [phone].  Normal or non-critical lab and imaging results will be communicated to you by MyChart, letter or phone within 4 business days after the clinic has received the results. If you do not hear from us within 7 days, please contact the clinic through MyChart or phone. If you have a critical or abnormal lab result, we will notify you by phone as soon as possible.  Submit refill requests through Cur[employer] or call your pharmacy and they will forward the refill request to us. Please allow 3 business days for your refill to be completed.          Additional Information About Your Visit       "  MyChart Information     Wiggio lets you send messages to your doctor, view your test results, renew your prescriptions, schedule appointments and more. To sign up, go to [email or web address] . Click on \"Log in\" on the left side of the screen, which will take you to the Welcome page. Then click on \"Sign up Now\" on the right side of the page.     You will be asked to enter the access code listed below, as well as some personal information. Please follow the directions to create your username and password.     Your access code is: [email or web address]  Expires: 2017  9:05 AM     Your access code will  in 90 days. If you need help or a new code, please call your [place] clinic or [phone].        Care EveryWhere ID     This is your Care EveryWhere ID. This could be used by other organizations to access your [place] medical records  [id number]        Your Vitals Were     Last Period BMI (Body Mass Index)                2017 (Approximate) 31.04 kg/m2           Blood Pressure from Last 3 Encounters:   17 110/60   17 120/78   17 108/58    Weight from Last 3 Encounters:   17 167 lb (75.8 kg)   17 169 lb (76.7 kg)   17 162 lb (73.5 kg)              Today, you had the following     No orders found for display       Primary Care Provider Office Phone # Fax #    NITO Monroy [phone]       [facility] [address] [place] [address]        Equal Access to Services     [facility] : Hadii domenico engel hadasho Sobrianna, waaxda luqadaha, qaybta kaalmada suzanne, fe hoffman. So [facility] [place] [phone].    ATENCIÓN: Si habla español, tiene a boston disposición servicios gratuitos de asistencia lingüística. Llame al [phone].    We comply with applicable federal civil rights laws and [place] laws. We do not discriminate on the basis of race, color, national origin, age, disability sex, sexual orientation or " gender identity.            Thank you!     Thank you for choosing [facility] [place]  for your care. Our goal is always to provide you with excellent care. Hearing back from our patients is one way we can continue to improve our services. Please take a few minutes to complete the written survey that you may receive in the mail after your visit with us. Thank you!             Your Updated Medication List - Protect others around you: Learn how to safely use, store and throw away your medicines at www.disposemymeds.org.          This list is accurate as of: 9/6/17  3:24 PM.  Always use your most recent med list.                   Brand Name Dispense Instructions for use Diagnosis    nicotine 14 MG/24HR 24 hr patch    NICODERM CQ    30 patch    Place 1 patch onto the skin every 24 hours    Smokes and motivated to quit       prenatal multivitamin plus iron 27-0.8 MG Tabs per tablet      Take 1 tablet by mouth daily

## 2017-09-06 NOTE — PATIENT INSTRUCTIONS
Return to clinic in 4 weeks.  If you think your bag of water is broken; have bleeding like a period; think you are in labor; or are worried about your baby's movement, please call the labor and delivery unit at [phone].

## 2017-09-26 ENCOUNTER — HOSPITAL ENCOUNTER (OUTPATIENT)
Dept: ULTRASOUND IMAGING | Facility: HOSPITAL | Age: 27
Discharge: HOME | End: 2017-09-26
Attending: OBSTETRICS & GYNECOLOGY | Admitting: OBSTETRICS & GYNECOLOGY
Payer: COMMERCIAL

## 2017-09-26 ENCOUNTER — HOSPITAL ENCOUNTER (OUTPATIENT)
Dept: ULTRASOUND IMAGING | Facility: CLINIC | Age: 27
End: 2017-09-26
Attending: OBSTETRICS & GYNECOLOGY
Payer: COMMERCIAL

## 2017-09-26 DIAGNOSIS — O26.90 PREGNANCY RELATED CONDITION, UNSPECIFIED TRIMESTER: ICD-10-CM

## 2017-09-26 PROCEDURE — 76811 OB US DETAILED SNGL FETUS: CPT | Mod: TC

## 2017-09-29 DIAGNOSIS — O28.3 INCREASED NUCHAL TRANSLUCENCY SPACE ON FETAL ULTRASOUND: Primary | ICD-10-CM

## 2017-09-29 DIAGNOSIS — O09.891 SUPERVISION OF OTHER HIGH RISK PREGNANCIES, FIRST TRIMESTER: ICD-10-CM

## 2017-10-03 ENCOUNTER — PRENATAL OFFICE VISIT (OUTPATIENT)
Dept: OBGYN | Facility: OTHER | Age: 27
End: 2017-10-03
Attending: OBSTETRICS & GYNECOLOGY
Payer: COMMERCIAL

## 2017-10-03 VITALS — DIASTOLIC BLOOD PRESSURE: 70 MMHG | SYSTOLIC BLOOD PRESSURE: 108 MMHG | BODY MASS INDEX: 30.67 KG/M2 | WEIGHT: 165 LBS

## 2017-10-03 DIAGNOSIS — Z34.80 SUPERVISION OF OTHER NORMAL PREGNANCY, ANTEPARTUM: Primary | ICD-10-CM

## 2017-10-03 PROBLEM — O09.891 SUPERVISION OF OTHER HIGH RISK PREGNANCIES, FIRST TRIMESTER: Status: RESOLVED | Noted: 2017-06-20 | Resolved: 2017-10-03

## 2017-10-03 PROCEDURE — 99207 ZZC PRENATAL VISIT: CPT | Performed by: OBSTETRICS & GYNECOLOGY

## 2017-10-03 NOTE — MR AVS SNAPSHOT
After Visit Summary   10/3/2017    Arabella Desai    MRN: [id number]           Patient Information     Date Of Birth          1990        Visit Information        Provider Department      10/3/2017 9:10 AM Kayla Dean MD [facility]        Today's Diagnoses     Supervision of other normal pregnancy, antepartum    -  1      Care Instructions    Return to clinic in 4 weeks.            Follow-ups after your visit        Your next 10 appointments already scheduled     Nov 01, 2017  8:30 AM CDT   (Arrive by 8:15 AM)   ESTABLISHED PRENATAL with Kayla Dean MD   [facility] [place] ([facility] [place] )    [address]  [place] [address]   [phone]              Future tests that were ordered for you today     Open Future Orders        Priority Expected Expires Ordered    CBC with platelets Routine 10/30/2017 11/23/2017 10/4/2017    Glucose tolerance gest screen 1 hour Routine 10/30/2017 11/23/2017 10/4/2017    UA with Microscopic reflex to Culture Routine 10/30/2017 11/23/2017 10/4/2017            Who to contact     If you have questions or need follow up information about today's clinic visit or your schedule please [facility] at [phone].  Normal or non-critical lab and imaging results will be communicated to you by MyChart, letter or phone within 4 business days after the clinic has received the results. If you do not hear from us within 7 days, please contact the clinic through Smart [employer] or phone. If you have a critical or abnormal lab result, we will notify you by phone as soon as possible.  Submit refill requests through Buzzero or call your pharmacy and they will forward the refill request to us. Please allow 3 business days for your refill to be completed.          Additional Information About Your Visit        Smart SurgicalharCazoodle Information     Buzzero lets you send messages to your doctor, view your test results, renew  "your prescriptions, schedule appointments and more. To sign up, go to [email or web address] . Click on \"Log in\" on the left side of the screen, which will take you to the Welcome page. Then click on \"Sign up Now\" on the right side of the page.     You will be asked to enter the access code listed below, as well as some personal information. Please follow the directions to create your username and password.     Your access code is: [email or web address]  Expires: 2018  1:50 PM     Your access code will  in 90 days. If you need help or a new code, please call your [place] clinic or [phone].        Care EveryWhere ID     This is your Care EveryWhere ID. This could be used by other organizations to access your [place] medical records  [id number]        Your Vitals Were     Last Period BMI (Body Mass Index)                2017 (Approximate) 30.67 kg/m2           Blood Pressure from Last 3 Encounters:   10/03/17 108/70   17 110/60   17 120/78    Weight from Last 3 Encounters:   10/03/17 165 lb (74.8 kg)   17 167 lb (75.8 kg)   17 169 lb (76.7 kg)               Primary Care Provider Office Phone # Fax #    KatherineNITO Vazquez [phone]       [facility] [address] [facility] [address]  [facility] [address]        Equal Access to Services     NIGHAT SOMMERS AH: Hadii aad ku hadasho Soomaali, waaxda luqadaha, qaybta kaalmada adeegyada, fe masters . So [facility] [phone].    ATENCIÓN: Si habla benjaminañol, tiene a boston disposición servicios gratuitos de asistencia lingüística. Llame al [phone].    We comply with applicable federal civil rights laws and [place] laws. We do not discriminate on the basis of race, color, national origin, age, disability, sex, sexual orientation, or gender identity.            Thank you!     Thank you for choosing [facility]  for your care. Our goal is always to provide you with excellent care. " Hearing back from our patients is one way we can continue to improve our services. Please take a few minutes to complete the written survey that you may receive in the mail after your visit with us. Thank you!             Your Updated Medication List - Protect others around you: Learn how to safely use, store and throw away your medicines at www.disposemymeds.org.          This list is accurate as of: 10/3/17 11:59 PM.  Always use your most recent med list.                   Brand Name Dispense Instructions for use Diagnosis    nicotine 14 MG/24HR 24 hr patch    NICODERM CQ    30 patch    Place 1 patch onto the skin every 24 hours    Smokes and motivated to quit       prenatal multivitamin plus iron 27-0.8 MG Tabs per tablet      Take 1 tablet by mouth daily

## 2017-10-20 ENCOUNTER — TRANSFERRED RECORDS (OUTPATIENT)
Dept: HEALTH INFORMATION MANAGEMENT | Facility: HOSPITAL | Age: 27
End: 2017-10-20

## 2017-11-01 ENCOUNTER — PRENATAL OFFICE VISIT (OUTPATIENT)
Dept: OBGYN | Facility: OTHER | Age: 27
End: 2017-11-01
Attending: OBSTETRICS & GYNECOLOGY
Payer: COMMERCIAL

## 2017-11-01 VITALS — DIASTOLIC BLOOD PRESSURE: 66 MMHG | BODY MASS INDEX: 30.3 KG/M2 | SYSTOLIC BLOOD PRESSURE: 112 MMHG | WEIGHT: 163 LBS

## 2017-11-01 DIAGNOSIS — Z34.80 SUPERVISION OF OTHER NORMAL PREGNANCY, ANTEPARTUM: Primary | ICD-10-CM

## 2017-11-01 PROCEDURE — 99207 ZZC PRENATAL VISIT: CPT | Performed by: OBSTETRICS & GYNECOLOGY

## 2017-11-01 NOTE — MR AVS SNAPSHOT
"              After Visit Summary   11/1/2017    Arabella Desai    MRN: [id number]           Patient Information     Date Of Birth          1990        Visit Information        Provider Department      11/1/2017 8:30 AM Kayla Dean MD [facility]        Today's Diagnoses     Supervision of other normal pregnancy, antepartum    -  1      Care Instructions    Return to the clinic in 1 week.  Making an appointment with Dr. Kaminski for her cough.          Follow-ups after your visit        Your next 10 appointments already scheduled     Nov 08, 2017  9:10 AM CST   (Arrive by 8:55 AM)   ESTABLISHED PRENATAL with Kayla Dean MD   [facility] ([facility] [place] )    [address]  [place] [address]   [phone]              Who to contact     If you have questions or need follow up information about today's clinic visit or your schedule please contact [facility] directly at [phone].  Normal or non-critical lab and imaging results will be communicated to you by MyChart, letter or phone within 4 business days after the clinic has received the results. If you do not hear from us within 7 days, please contact the clinic through [employer] or phone. If you have a critical or abnormal lab result, we will notify you by phone as soon as possible.  Submit refill requests through Vidapp or call your pharmacy and they will forward the refill request to us. Please allow 3 business days for your refill to be completed.          Additional Information About Your Visit        [employer] Information     Vidapp lets you send messages to your doctor, view your test results, renew your prescriptions, schedule appointments and more. To sign up, go to [email or web address] . Click on \"Log in\" on the left side of the screen, which will take you to the Welcome page. Then click on \"Sign up Now\" on the right side of the page.     You will be asked to enter the " access code listed below, as well as some personal information. Please follow the directions to create your username and password.     Your access code is: [email or web address]  Expires: 2018  1:50 PM     Your access code will  in 90 days. If you need help or a new code, please call your [place] clinic or [phone].        Care EveryWhere ID     This is your Care EveryWhere ID. This could be used by other organizations to access your [place] medical records  [id number]        Your Vitals Were     Last Period BMI (Body Mass Index)                2017 (Approximate) 30.3 kg/m2           Blood Pressure from Last 3 Encounters:   17 112/66   10/03/17 108/70   17 110/60    Weight from Last 3 Encounters:   17 163 lb (73.9 kg)   10/03/17 165 lb (74.8 kg)   17 167 lb (75.8 kg)              Today, you had the following     No orders found for display       Primary Care Provider Office Phone # Fax #    KatherineNIOT Vazquez [phone]       [facility] [address] [facility] [address]  [facility] [address]        Equal Access to Services     ERIK SOMMERS AH: Hadii aad ku hadasho Soomaali, waaxda luqadaha, qaybta kaalmada adeegyada, waxay idiin hayolgan marguerite masters . So [facility] [place] [facility] [phone].    ATENCIÓN: Si habla español, tiene a boston disposición servicios gratuitos de asistencia lingüística. Llame al [phone].    We comply with applicable federal civil rights laws and [place] laws. We do not discriminate on the basis of race, color, national origin, age, disability, sex, sexual orientation, or gender identity.            Thank you!     [facility]. Our goal is always to provide you with excellent care. Hearing back from our patients is one way we can continue to improve our services. Please take a few minutes to complete the written survey that you may receive in the mail after your visit with us. Thank you!              Your Updated Medication List - Protect others around you: Learn how to safely use, store and throw away your medicines at www.disposemymeds.org.          This list is accurate as of: 11/1/17  8:53 AM.  Always use your most recent med list.                   Brand Name Dispense Instructions for use Diagnosis    nicotine 14 MG/24HR 24 hr patch    NICODERM CQ    30 patch    Place 1 patch onto the skin every 24 hours    Smokes and motivated to quit       prenatal multivitamin plus iron 27-0.8 MG Tabs per tablet      Take 1 tablet by mouth daily

## 2017-11-06 ENCOUNTER — OFFICE VISIT (OUTPATIENT)
Dept: FAMILY MEDICINE | Facility: OTHER | Age: 27
End: 2017-11-06
Attending: FAMILY MEDICINE
Payer: COMMERCIAL

## 2017-11-06 ENCOUNTER — OFFICE VISIT (OUTPATIENT)
Dept: CHIROPRACTIC MEDICINE | Facility: OTHER | Age: 27
End: 2017-11-06
Attending: CHIROPRACTOR
Payer: COMMERCIAL

## 2017-11-06 VITALS
BODY MASS INDEX: 30.89 KG/M2 | DIASTOLIC BLOOD PRESSURE: 60 MMHG | HEART RATE: 99 BPM | RESPIRATION RATE: 20 BRPM | SYSTOLIC BLOOD PRESSURE: 110 MMHG | WEIGHT: 163.6 LBS | HEIGHT: 61 IN | OXYGEN SATURATION: 98 % | TEMPERATURE: 98.1 F

## 2017-11-06 DIAGNOSIS — R07.81 RIB PAIN ON RIGHT SIDE: ICD-10-CM

## 2017-11-06 DIAGNOSIS — M99.02 SEGMENTAL AND SOMATIC DYSFUNCTION OF THORACIC REGION: Primary | ICD-10-CM

## 2017-11-06 DIAGNOSIS — M99.01 SEGMENTAL AND SOMATIC DYSFUNCTION OF CERVICAL REGION: ICD-10-CM

## 2017-11-06 DIAGNOSIS — M54.2 CERVICALGIA: ICD-10-CM

## 2017-11-06 DIAGNOSIS — J20.9 ACUTE BRONCHITIS WITH SYMPTOMS > 10 DAYS: Primary | ICD-10-CM

## 2017-11-06 PROCEDURE — 99213 OFFICE O/P EST LOW 20 MIN: CPT | Performed by: FAMILY MEDICINE

## 2017-11-06 PROCEDURE — 99202 OFFICE O/P NEW SF 15 MIN: CPT | Mod: 25 | Performed by: CHIROPRACTOR

## 2017-11-06 PROCEDURE — 98940 CHIROPRACT MANJ 1-2 REGIONS: CPT | Mod: AT | Performed by: CHIROPRACTOR

## 2017-11-06 RX ORDER — AMOXICILLIN 500 MG/1
500 CAPSULE ORAL 2 TIMES DAILY
Qty: 20 CAPSULE | Refills: 0 | Status: SHIPPED | OUTPATIENT
Start: 2017-11-06 | End: 2017-11-16

## 2017-11-06 ASSESSMENT — PAIN SCALES - GENERAL: PAINLEVEL: MILD PAIN (2)

## 2017-11-06 ASSESSMENT — PATIENT HEALTH QUESTIONNAIRE - PHQ9
SUM OF ALL RESPONSES TO PHQ QUESTIONS 1-9: 3
5. POOR APPETITE OR OVEREATING: SEVERAL DAYS

## 2017-11-06 ASSESSMENT — ANXIETY QUESTIONNAIRES
5. BEING SO RESTLESS THAT IT IS HARD TO SIT STILL: NOT AT ALL
2. NOT BEING ABLE TO STOP OR CONTROL WORRYING: SEVERAL DAYS
GAD7 TOTAL SCORE: 3
IF YOU CHECKED OFF ANY PROBLEMS ON THIS QUESTIONNAIRE, HOW DIFFICULT HAVE THESE PROBLEMS MADE IT FOR YOU TO DO YOUR WORK, TAKE CARE OF THINGS AT HOME, OR GET ALONG WITH OTHER PEOPLE: NOT DIFFICULT AT ALL
1. FEELING NERVOUS, ANXIOUS, OR ON EDGE: NOT AT ALL
6. BECOMING EASILY ANNOYED OR IRRITABLE: NOT AT ALL
3. WORRYING TOO MUCH ABOUT DIFFERENT THINGS: SEVERAL DAYS
7. FEELING AFRAID AS IF SOMETHING AWFUL MIGHT HAPPEN: NOT AT ALL

## 2017-11-06 NOTE — NURSING NOTE
"Chief Complaint   Patient presents with     Cough     Rib and back pain due to coughing 27 week OB. Have had cough for almost 4 weeks       Initial /60  Pulse 99  Temp 98.1  F (36.7  C) (Tympanic)  Resp 20  Ht 5' 1\" (1.549 m)  Wt 163 lb 9.6 oz (74.2 kg)  LMP 05/01/2017 (Approximate)  SpO2 98%  BMI 30.91 kg/m2 Estimated body mass index is 30.91 kg/(m^2) as calculated from the following:    Height as of this encounter: 5' 1\" (1.549 m).    Weight as of this encounter: 163 lb 9.6 oz (74.2 kg).  Medication Reconciliation: complete   SUMMER JORDAN LPN  "

## 2017-11-06 NOTE — MR AVS SNAPSHOT
After Visit Summary   11/6/2017    Arabella Desai    MRN: [id number]           Patient Information     Date Of Birth          1990        Visit Information        Provider Department      11/6/2017 11:30 AM Anayeli Kaminski MD [facility] [place]        Today's Diagnoses     Acute bronchitis with symptoms > 10 days    -  1    Rib pain on right side          Care Instructions    Complete antibiotics.  Symptomatic cares - fluids, rest, Tylenol, etc.  Referral to chiropractic for rib.          Follow-ups after your visit        Additional Services     CHIROPRACTIC REFERRAL       Your provider has referred you to: Dr Ortiz    Please be aware that coverage of these services is subject to the terms and limitations of your health insurance plan.  Call member services at your health plan with any benefit or coverage questions.      Please bring the following to your appointment:    >>   Any x-rays, CTs or MRIs which have been performed.  Contact the facility where they were done to arrange for  prior to your scheduled appointment.  Any new CT, MRI or other procedures ordered by your specialist must be performed at a [facility] facility or coordinated by your clinic's referral office.    >>   List of current medications   >>   This referral request   >>   Any documents/labs given to you for this referral                  Your next 10 appointments already scheduled     Nov 08, 2017  9:10 AM CST   (Arrive by 8:55 AM)   ESTABLISHED PRENATAL with Kayla Dean MD   [facility] [place] ([facility] [place] )    [address] [place] [address]   [phone]              Who to contact     If you have questions or need follow up information about today's clinic visit or your schedule please contact [facility] [place] directly at [phone].  Normal or non-critical lab and imaging results will be communicated to you by MyChart, letter or phone within 4  "business days after the clinic has received the results. If you do not hear from us within 7 days, please contact the clinic through [facility] or phone. If you have a critical or abnormal lab result, we will notify you by phone as soon as possible.  Submit refill requests through [employer] or call your pharmacy and they will forward the refill request to us. Please allow 3 business days for your refill to be completed.          Additional Information About Your Visit        [facility] Information     [employer] lets you send messages to your doctor, view your test results, renew your prescriptions, schedule appointments and more. To sign up, go to [email or web address] . Click on \"Log in\" on the left side of the screen, which will take you to the Welcome page. Then click on \"Sign up Now\" on the right side of the page.     You will be asked to enter the access code listed below, as well as some personal information. Please follow the directions to create your username and password.     Your access code is: [email or web address]  Expires: 2018 12:50 PM     Your access code will  in 90 days. If you need help or a new code, please call your [place] clinic or [phone].        Care EveryWhere ID     This is your Care EveryWhere ID. This could be used by other organizations to access your [place] medical records  [id number]        Your Vitals Were     Pulse Temperature Respirations Height Last Period Pulse Oximetry    99 98.1  F (36.7  C) (Tympanic) 20 5' 1\" (1.549 m) 2017 (Approximate) 98%    BMI (Body Mass Index)                   30.91 kg/m2            Blood Pressure from Last 3 Encounters:   17 110/60   17 112/66   10/03/17 108/70    Weight from Last 3 Encounters:   17 163 lb 9.6 oz (74.2 kg)   17 163 lb (73.9 kg)   10/03/17 165 lb (74.8 kg)              We Performed the Following     CHIROPRACTIC REFERRAL          Today's Medication Changes          These changes are accurate as of: " 11/6/17 12:00 PM.  If you have any questions, ask your nurse or doctor.               Start taking these medicines.        Dose/Directions    amoxicillin 500 MG capsule   Commonly known as:  AMOXIL   Used for:  Acute bronchitis with symptoms > 10 days   Started by:  Anayeli Kaminski MD        Dose:  500 mg   Take 1 capsule (500 mg) by mouth 2 times daily for 10 days   Quantity:  20 capsule   Refills:  0            Where to get your medicines      These medications were sent to [facility] [place] [facility] [address]  [address] [facility] [address]     Phone:  [phone]     amoxicillin 500 MG capsule                Primary Care Provider Office Phone # Fax #    NITO Monroy [phone]       [facility] [address]  [facility] [address]        Equal Access to Services     [facility] : Hadii aad ku hadasho Soomaali, waaxda luqadaha, qaybta kaalmada adeegyada, waxay luis min hayaan marguerite masters . So [facility] [phone].    ATENCIÓN: Si habla español, tiene a boston disposición servicios gratuitos de asistencia lingüística. Llame al [phone].    We comply with applicable federal civil rights laws and [place] laws. We do not discriminate on the basis of race, color, national origin, age, disability, sex, sexual orientation, or gender identity.            Thank you!     Thank you for choosing [facility]  for your care. Our goal is always to provide you with excellent care. Hearing back from our patients is one way we can continue to improve our services. Please take a few minutes to complete the written survey that you may receive in the mail after your visit with us. Thank you!             Your Updated Medication List - Protect others around you: Learn how to safely use, store and throw away your medicines at www.disposemymeds.org.          This list is accurate as of: 11/6/17 12:00 PM.  Always use your most recent med list.                    Brand Name Dispense Instructions for use Diagnosis    amoxicillin 500 MG capsule    AMOXIL    20 capsule    Take 1 capsule (500 mg) by mouth 2 times daily for 10 days    Acute bronchitis with symptoms > 10 days       nicotine 14 MG/24HR 24 hr patch    NICODERM CQ    30 patch    Place 1 patch onto the skin every 24 hours    Smokes and motivated to quit       prenatal multivitamin plus iron 27-0.8 MG Tabs per tablet      Take 1 tablet by mouth daily

## 2017-11-06 NOTE — MR AVS SNAPSHOT
"              After Visit Summary   11/6/2017    Arabella Desai    MRN: [id number]           Patient Information     Date Of Birth          1990        Visit Information        Provider Department      11/6/2017 4:10 PM Cosme Ortiz [facility]        Today's Diagnoses     Segmental and somatic dysfunction of thoracic region    -  1    Cervicalgia        Segmental and somatic dysfunction of cervical region           Follow-ups after your visit        Your next 10 appointments already scheduled     Nov 08, 2017  9:10 AM CST   (Arrive by 8:55 AM)   ESTABLISHED PRENATAL with Kayla Dean MD   [facility] ([facility] )    [address]  [place] [address]   [phone]              Who to contact     If you have questions or need follow up information about today's clinic visit or your schedule please contact  [facility] [place] directly at [phone].  Normal or non-critical lab and imaging results will be communicated to you by MyChart, letter or phone within 4 business days after the clinic has received the results. If you do not hear from us within 7 days, please contact the clinic through MyChart or phone. If you have a critical or abnormal lab result, we will notify you by phone as soon as possible.  Submit refill requests through [employer] or call your pharmacy and they will forward the refill request to us. Please allow 3 business days for your refill to be completed.          Additional Information About Your Visit        MyChart Information     [employer] lets you send messages to your doctor, view your test results, renew your prescriptions, schedule appointments and more. To sign up, go to [email or web address] . Click on \"Log in\" on the left side of the screen, which will take you to the Welcome page. Then click on \"Sign up Now\" on the right side of the page.     You will be asked to enter the access code listed below, as well as some " personal information. Please follow the directions to create your username and password.     Your access code is: [email or web address]  Expires: 2018 12:50 PM     Your access code will  in 90 days. If you need help or a new code, please call your [place] clinic or [phone].        Care EveryWhere ID     This is your Care EveryWhere ID. This could be used by other organizations to access your [place] medical records  [id number]        Your Vitals Were     Last Period                   2017 (Approximate)            Blood Pressure from Last 3 Encounters:   17 110/60   17 112/66   10/03/17 108/70    Weight from Last 3 Encounters:   17 163 lb 9.6 oz (74.2 kg)   17 163 lb (73.9 kg)   10/03/17 165 lb (74.8 kg)              We Performed the Following     CHIROPRAC MANIP,SPINAL,1-2 REGIONS          Today's Medication Changes          These changes are accurate as of: 17 11:59 PM.  If you have any questions, ask your nurse or doctor.               Start taking these medicines.        Dose/Directions    amoxicillin 500 MG capsule   Commonly known as:  AMOXIL   Used for:  Acute bronchitis with symptoms > 10 days   Started by:  Anayeli Kaminski MD        Dose:  500 mg   Take 1 capsule (500 mg) by mouth 2 times daily for 10 days   Quantity:  20 capsule   Refills:  0            Where to get your medicines      These medications were sent to [facility] - [place] - [address]  [address] [place] [address]     Phone:  [phone]     amoxicillin 500 MG capsule                Primary Care Provider Office Phone # Fax #    NITO Monroy [phone]       [facility] [address]  [place] [address]        Equal Access to Services     [facility] MATTHIEU : Humaira rizvio Sobrianna, waaxda luqadaha, qaybta kaalmada adeegyada, fe hoffman. So [facility] [phone].    ATENCIÓN: Si janis hearn,  tiene a boston disposición servicios gratuitos de asistencia lingüística. Kady carmona [phone].    We comply with applicable federal civil rights laws and [place] laws. We do not discriminate on the basis of race, color, national origin, age, disability, sex, sexual orientation, or gender identity.            Thank you!     Thank you for choosing  [facility]  for your care. Our goal is always to provide you with excellent care. Hearing back from our patients is one way we can continue to improve our services. Please take a few minutes to complete the written survey that you may receive in the mail after your visit with us. Thank you!             Your Updated Medication List - Protect others around you: Learn how to safely use, store and throw away your medicines at www.disposemymeds.org.          This list is accurate as of: 11/6/17 11:59 PM.  Always use your most recent med list.                   Brand Name Dispense Instructions for use Diagnosis    amoxicillin 500 MG capsule    AMOXIL    20 capsule    Take 1 capsule (500 mg) by mouth 2 times daily for 10 days    Acute bronchitis with symptoms > 10 days       nicotine 14 MG/24HR 24 hr patch    NICODERM CQ    30 patch    Place 1 patch onto the skin every 24 hours    Smokes and motivated to quit       prenatal multivitamin plus iron 27-0.8 MG Tabs per tablet      Take 1 tablet by mouth daily

## 2017-11-06 NOTE — PROGRESS NOTES
SUBJECTIVE: 27 year old female complaining of cough and congestion for 4 week(s).   The patient describes post tussive emesis rarely.  Rib pain from coughing, right side. Fever 100.5 at onset.  The patient denies a history of abdominal pain, diarrhea.   Smoking history: No.  Former  Relevant past medical history: positive for 27 weeks pregnant.    OBJECTIVE: The patient appears healthy, alert and no distress.   EARS: External ears normal. Canals clear. TM's normal.  NOSE/SINUS: Nares normal. Septum midline. Mucosa normal. No drainage or sinus tenderness.   THROAT: mild erythema and post nasal drainage   NECK:Neck supple. No adenopathy. Thyroid symmetric, normal size,   CHEST: Clear to auscultation  CV: regular rate, rhythm    ASSESSMENT:   (J20.9) Acute bronchitis with symptoms > 10 days  (primary encounter diagnosis)  Plan: amoxicillin (AMOXIL) 500 MG capsule    (R07.81) Rib pain on right side  Plan: CHIROPRACTIC REFERRAL      PLAN:   Patient Instructions   Complete antibiotics.  Symptomatic cares - fluids, rest, Tylenol, etc.  Referral to chiropractic for rib.

## 2017-11-06 NOTE — PATIENT INSTRUCTIONS
Complete antibiotics.  Symptomatic cares - fluids, rest, Tylenol, etc.  Referral to chiropractic for rib.

## 2017-11-07 ASSESSMENT — ANXIETY QUESTIONNAIRES: GAD7 TOTAL SCORE: 3

## 2017-11-08 ENCOUNTER — PRENATAL OFFICE VISIT (OUTPATIENT)
Dept: OBGYN | Facility: OTHER | Age: 27
End: 2017-11-08
Attending: OBSTETRICS & GYNECOLOGY
Payer: COMMERCIAL

## 2017-11-08 VITALS — SYSTOLIC BLOOD PRESSURE: 110 MMHG | WEIGHT: 162 LBS | DIASTOLIC BLOOD PRESSURE: 60 MMHG | BODY MASS INDEX: 30.61 KG/M2

## 2017-11-08 DIAGNOSIS — O99.810 GLUCOSE INTOLERANCE OF PREGNANCY: Primary | ICD-10-CM

## 2017-11-08 DIAGNOSIS — Z23 NEED FOR INFLUENZA VACCINATION: ICD-10-CM

## 2017-11-08 DIAGNOSIS — Z23 NEED FOR TDAP VACCINATION: ICD-10-CM

## 2017-11-08 DIAGNOSIS — Z34.80 SUPERVISION OF OTHER NORMAL PREGNANCY, ANTEPARTUM: ICD-10-CM

## 2017-11-08 DIAGNOSIS — Z23 NEED FOR VACCINATION: ICD-10-CM

## 2017-11-08 DIAGNOSIS — Z34.80 SUPERVISION OF OTHER NORMAL PREGNANCY, ANTEPARTUM: Primary | ICD-10-CM

## 2017-11-08 PROBLEM — R05.3 CHRONIC COUGH: Status: ACTIVE | Noted: 2017-11-08

## 2017-11-08 LAB
ALBUMIN UR-MCNC: 10 MG/DL
APPEARANCE UR: CLEAR
BACTERIA #/AREA URNS HPF: ABNORMAL /HPF
BILIRUB UR QL STRIP: NEGATIVE
COLOR UR AUTO: YELLOW
ERYTHROCYTE [DISTWIDTH] IN BLOOD BY AUTOMATED COUNT: 13.4 % (ref 10–15)
GLUCOSE 1H P 50 G GLC PO SERPL-MCNC: 140 MG/DL (ref 60–129)
GLUCOSE UR STRIP-MCNC: NEGATIVE MG/DL
HCT VFR BLD AUTO: 36.3 % (ref 35–47)
HGB BLD-MCNC: 13 G/DL (ref 11.7–15.7)
HGB UR QL STRIP: NEGATIVE
KETONES UR STRIP-MCNC: NEGATIVE MG/DL
LEUKOCYTE ESTERASE UR QL STRIP: NEGATIVE
MCH RBC QN AUTO: 30.7 PG (ref 26.5–33)
MCHC RBC AUTO-ENTMCNC: 35.8 G/DL (ref 31.5–36.5)
MCV RBC AUTO: 86 FL (ref 78–100)
MUCOUS THREADS #/AREA URNS LPF: PRESENT /LPF
NITRATE UR QL: NEGATIVE
PH UR STRIP: 7 PH (ref 4.7–8)
PLATELET # BLD AUTO: 216 10E9/L (ref 150–450)
RBC # BLD AUTO: 4.24 10E12/L (ref 3.8–5.2)
RBC #/AREA URNS AUTO: <1 /HPF (ref 0–2)
SOURCE: ABNORMAL
SP GR UR STRIP: 1.01 (ref 1–1.03)
SQUAMOUS #/AREA URNS AUTO: <1 /HPF (ref 0–1)
UROBILINOGEN UR STRIP-MCNC: NORMAL MG/DL (ref 0–2)
WBC # BLD AUTO: 10.6 10E9/L (ref 4–11)
WBC #/AREA URNS AUTO: <1 /HPF (ref 0–2)

## 2017-11-08 PROCEDURE — 85027 COMPLETE CBC AUTOMATED: CPT | Performed by: OBSTETRICS & GYNECOLOGY

## 2017-11-08 PROCEDURE — 36415 COLL VENOUS BLD VENIPUNCTURE: CPT | Performed by: OBSTETRICS & GYNECOLOGY

## 2017-11-08 PROCEDURE — 90471 IMMUNIZATION ADMIN: CPT | Performed by: OBSTETRICS & GYNECOLOGY

## 2017-11-08 PROCEDURE — 82950 GLUCOSE TEST: CPT | Performed by: OBSTETRICS & GYNECOLOGY

## 2017-11-08 PROCEDURE — 99207 ZZC PRENATAL VISIT: CPT | Mod: 25 | Performed by: OBSTETRICS & GYNECOLOGY

## 2017-11-08 PROCEDURE — 90715 TDAP VACCINE 7 YRS/> IM: CPT | Performed by: OBSTETRICS & GYNECOLOGY

## 2017-11-08 PROCEDURE — 81001 URINALYSIS AUTO W/SCOPE: CPT | Performed by: OBSTETRICS & GYNECOLOGY

## 2017-11-08 NOTE — PATIENT INSTRUCTIONS
Return to clinic in 2 weeks  Tdap today  If you think your bag of water is broken; have bleeding like a period; think you are in labor; or are worried about your baby's movement, please call the labor and delivery unit at [phone].

## 2017-11-08 NOTE — MR AVS SNAPSHOT
"              After Visit Summary   11/8/2017    Arabella Desai    MRN: [id number]           Patient Information     Date Of Birth          1990        Visit Information        Provider Department      11/8/2017 9:10 AM Kayla Dean MD [facility] [place]        Today's Diagnoses     Supervision of other normal pregnancy, antepartum    -  1    Need for vaccination        Need for Tdap vaccination          Care Instructions    Return to clinic in 2 weeks  Tdap today  If you think your bag of water is broken; have bleeding like a period; think you are in labor; or are worried about your baby's movement, please call the labor and delivery unit at [phone].            Follow-ups after your visit        Who to contact     If you have questions or need follow up information about today's clinic visit or your schedule please contact [facility] [place] directly at [phone].  Normal or non-critical lab and imaging results will be communicated to you by [employer], letter or phone within 4 business days after the clinic has received the results. If you do not hear from us within 7 days, please contact the clinic through [employer] or phone. If you have a critical or abnormal lab result, we will notify you by phone as soon as possible.  Submit refill requests through [employer] or call your pharmacy and they will forward the refill request to us. Please allow 3 business days for your refill to be completed.          Additional Information About Your Visit        MyChart Information     [employer] lets you send messages to your doctor, view your test results, renew your prescriptions, schedule appointments and more. To sign up, go to [email or web address] . Click on \"Log in\" on the left side of the screen, which will take you to the Welcome page. Then click on \"Sign up Now\" on the right side of the page.     You will be asked to enter the access code listed below, as well as some personal information. Please " follow the directions to create your username and password.     Your access code is: [email or web address]  Expires: 2018 12:50 PM     Your access code will  in 90 days. If you need help or a new code, please call your [place] clinic or [phone].        Care EveryWhere ID     This is your Care EveryWhere ID. This could be used by other organizations to access your [place] medical records  [id number]        Your Vitals Were     Last Period BMI (Body Mass Index)                2017 (Approximate) 30.61 kg/m2           Blood Pressure from Last 3 Encounters:   17 110/60   17 110/60   17 112/66    Weight from Last 3 Encounters:   17 162 lb (73.5 kg)   17 163 lb 9.6 oz (74.2 kg)   17 163 lb (73.9 kg)              We Performed the Following     1st  Administration  [42834]     TDAP VACCINE (ADACEL) [82774.002]        Primary Care Provider Office Phone # Fax #    NITO Monroy [phone]       [facility] [address] [facility]  [place] [address]        Equal Access to Services     ERIK SOMMERS AH: Hadii aad ku hadasho Soomaali, waaxda luqadaha, qaybta kaalmada adeegyada, waxay luis min hayolgan marguerite masters . So [facility] [phone].    ATENCIÓN: Si habla español, tiene a boston disposición servicios gratuitos de asistencia lingüística. Llame al [phone].    We comply with applicable federal civil rights laws and [place] laws. We do not discriminate on the basis of race, color, national origin, age, disability, sex, sexual orientation, or gender identity.            Thank you!     [facility]. Our goal is always to provide you with excellent care. Hearing back from our patients is one way we can continue to improve our services. Please take a few minutes to complete the written survey that you may receive in the mail after your visit with us. Thank you!             Your Updated Medication  List - Protect others around you: Learn how to safely use, store and throw away your medicines at www.disposemymeds.org.          This list is accurate as of: 11/8/17  9:55 AM.  Always use your most recent med list.                   Brand Name Dispense Instructions for use Diagnosis    amoxicillin 500 MG capsule    AMOXIL    20 capsule    Take 1 capsule (500 mg) by mouth 2 times daily for 10 days    Acute bronchitis with symptoms > 10 days       nicotine 14 MG/24HR 24 hr patch    NICODERM CQ    30 patch    Place 1 patch onto the skin every 24 hours    Smokes and motivated to quit       prenatal multivitamin plus iron 27-0.8 MG Tabs per tablet      Take 1 tablet by mouth daily

## 2017-11-08 NOTE — PROGRESS NOTES
28 Week Visit    Patient education provided on the following:  Effective positioning and latch techniques  Importance of early initiation of breastfeeding  Importance of rooming-in on a 24-hour basis    We reviewed the [place] [employer] Prenatal Toolkit in the [facility] Resource Book.  Patient questions and concerns addressed and reviewed. Support and encouragement provided.    VERONICA CEDEÑO    Immunization History   Administered Date(s) Administered     DTAP (<7y) 1990, 1990, 1990, 08/14/1991, 02/06/1995     HPV 06/28/2007, 08/28/2007, 12/24/2007     HepB 10/11/1999, 11/11/1999, 03/12/2000     Influenza Vaccine IM 3yrs+ 4 Valent IIV4 10/21/2013     Influenza vaccine ages 6-35 months 09/12/2011     MMR 05/09/1991, 06/04/2002     Meningococcal (Menactra ) 06/28/2007     Pneumococcal 23 valent 07/10/2017     Poliovirus, inactivated (IPV) 1990, 1990, 08/14/1991, 02/06/1995     TD (ADULT, 7+) 06/04/2002     TDAP Vaccine (Adacel) 11/08/2017     TDAP Vaccine (Boostrix) 04/03/2012, 04/26/2016     Varicella 10/11/1999     Refuses flu shot.  Tdap done.

## 2017-11-09 DIAGNOSIS — O99.810 GLUCOSE INTOLERANCE OF PREGNANCY: Primary | ICD-10-CM

## 2017-11-09 DIAGNOSIS — O99.810 GLUCOSE INTOLERANCE OF PREGNANCY: ICD-10-CM

## 2017-11-09 LAB
EST. AVERAGE GLUCOSE BLD GHB EST-MCNC: 85 MG/DL
GLUCOSE 2H P 75 G GLC PO SERPL-MCNC: 107 MG/DL (ref 60–200)
GLUCOSE BLD-MCNC: 68 MG/DL (ref 70–99)
HBA1C MFR BLD: 4.6 % (ref 4.3–6)

## 2017-11-09 PROCEDURE — 36416 COLLJ CAPILLARY BLOOD SPEC: CPT | Performed by: OBSTETRICS & GYNECOLOGY

## 2017-11-09 PROCEDURE — 82950 GLUCOSE TEST: CPT | Performed by: OBSTETRICS & GYNECOLOGY

## 2017-11-09 PROCEDURE — 83036 HEMOGLOBIN GLYCOSYLATED A1C: CPT | Performed by: OBSTETRICS & GYNECOLOGY

## 2017-11-09 PROCEDURE — 82947 ASSAY GLUCOSE BLOOD QUANT: CPT | Performed by: OBSTETRICS & GYNECOLOGY

## 2017-11-16 ENCOUNTER — HOSPITAL ENCOUNTER (OUTPATIENT)
Dept: EDUCATION SERVICES | Facility: HOSPITAL | Age: 27
Discharge: HOME | End: 2017-11-16
Attending: OBSTETRICS & GYNECOLOGY | Admitting: OBSTETRICS & GYNECOLOGY
Payer: COMMERCIAL

## 2017-11-16 VITALS
WEIGHT: 162 LBS | SYSTOLIC BLOOD PRESSURE: 106 MMHG | DIASTOLIC BLOOD PRESSURE: 65 MMHG | HEART RATE: 91 BPM | HEIGHT: 61 IN | BODY MASS INDEX: 30.58 KG/M2 | OXYGEN SATURATION: 98 %

## 2017-11-16 DIAGNOSIS — O99.810 GLUCOSE INTOLERANCE OF PREGNANCY: Primary | ICD-10-CM

## 2017-11-16 PROCEDURE — 97802 MEDICAL NUTRITION INDIV IN: CPT | Performed by: DIETITIAN, REGISTERED

## 2017-11-16 ASSESSMENT — PAIN SCALES - GENERAL: PAINLEVEL: NO PAIN (0)

## 2017-11-16 NOTE — PROGRESS NOTES
"Pt is here today for MNT r/t dx of glucose intolerance of pregnancy.  1 hour glucose - 140.  2 hour glucose 107.  This is patient's third pregnancy.  She did not have GDM with her other pregnancies and no babies were greater than 9 pounds.      Pt is in a hurry today as she has to work at 9:00 am and appointment was at 8:30 am.     /65  Pulse 91  Ht 1.549 m (5' 1\")  Wt 73.5 kg (162 lb)  LMP 05/01/2017 (Approximate)  SpO2 98%  BMI 30.61 kg/m2  Prepregnancy weight reported to be 150#.      Pt reports that she has less of an appetite when she is pregnant than she does when she is not pregnant.  She eats three meals/day - not much time for snacks as her job is very busy.  She dines out about 1x/week.  Drinks 1-2 sodas weekly and occasional juice.  No routine exercise.      Reviewed healthy diet for pregnancy, carbohydrate counting meal plan, carbohydrate limits, portion control, label reading, benefits of activity as allowed.  Written educational materials provided.  Pt listened and participated.      PLAN:  Follow meal plan provided.     Follow up: as needed.     Total time spent with pt was 20 minutes.   "

## 2017-11-16 NOTE — NURSING NOTE
"Chief Complaint   Patient presents with     Diabetes     new glucose intolerance       Initial /65  Pulse 91  Ht 1.549 m (5' 1\")  Wt 73.5 kg (162 lb)  LMP 05/01/2017 (Approximate)  SpO2 98%  BMI 30.61 kg/m2 Estimated body mass index is 30.61 kg/(m^2) as calculated from the following:    Height as of this encounter: 1.549 m (5' 1\").    Weight as of this encounter: 73.5 kg (162 lb).  Medication Reconciliation: complete   Sara Calles      "

## 2017-11-21 ENCOUNTER — PRENATAL OFFICE VISIT (OUTPATIENT)
Dept: OBGYN | Facility: OTHER | Age: 27
End: 2017-11-21
Attending: OBSTETRICS & GYNECOLOGY
Payer: COMMERCIAL

## 2017-11-21 VITALS — SYSTOLIC BLOOD PRESSURE: 102 MMHG | DIASTOLIC BLOOD PRESSURE: 58 MMHG | WEIGHT: 160 LBS | BODY MASS INDEX: 30.23 KG/M2

## 2017-11-21 DIAGNOSIS — Z34.80 SUPERVISION OF OTHER NORMAL PREGNANCY, ANTEPARTUM: Primary | ICD-10-CM

## 2017-11-21 PROCEDURE — 99207 ZZC PRENATAL VISIT: CPT | Performed by: OBSTETRICS & GYNECOLOGY

## 2017-11-21 NOTE — MR AVS SNAPSHOT
"              After Visit Summary   11/21/2017    Arabella Desai    MRN: [id number]           Patient Information     Date Of Birth          1990        Visit Information        Provider Department      11/21/2017 9:10 AM Kayla Dean MD [facility] [place]        Today's Diagnoses     Supervision of other normal pregnancy, antepartum    -  1      Care Instructions    Fasting glucose at 32 weeks.  Return to clinic in 2 weeks  If you think your bag of water is broken; have bleeding like a period; think you are in labor; or are worried about your baby's movement, please call the labor and delivery unit at [phone].            Follow-ups after your visit        Your next 10 appointments already scheduled     Dec 06, 2017  8:50 AM CST   (Arrive by 8:35 AM)   ESTABLISHED PRENATAL with Kayla Dean MD   [facility] [place] ([facility] [place] [facility] )    [address]  [place] [address] [phone]              Who to contact     If you have questions or need follow up information about today's clinic visit or your schedule please contact [facility] directly at [phone].  Normal or non-critical lab and imaging results will be communicated to you by MyChart, letter or phone within 4 business days after the clinic has received the results. If you do not hear from us within 7 days, please contact the clinic through Ingenium Golfhart or phone. If you have a critical or abnormal lab result, we will notify you by phone as soon as possible.  Submit refill requests through GOVECS or call your pharmacy and they will forward the refill request to us. Please allow 3 business days for your refill to be completed.          Additional Information About Your Visit        MyChart Information     GOVECS lets you send messages to your doctor, view your test results, renew your prescriptions, schedule appointments and more. To sign up, go to [email or web address] . Click on \"Log in\" on " "the left side of the screen, which will take you to the Welcome page. Then click on \"Sign up Now\" on the right side of the page.     You will be asked to enter the access code listed below, as well as some personal information. Please follow the directions to create your username and password.     Your access code is: [email or web address]  Expires: 2018 12:50 PM     Your access code will  in 90 days. If you need help or a new code, please call your [place] clinic or [phone].        Care EveryWhere ID     This is your Care EveryWhere ID. This could be used by other organizations to access your [facility] medical records  [id number]        Your Vitals Were     Last Period BMI (Body Mass Index)                2017 (Approximate) 30.23 kg/m2           Blood Pressure from Last 3 Encounters:   17 102/58   17 106/65   17 110/60    Weight from Last 3 Encounters:   17 160 lb (72.6 kg)   17 162 lb (73.5 kg)   17 162 lb (73.5 kg)              Today, you had the following     No orders found for display       Primary Care Provider Office Phone # Fax #    NITO Monroy [phone]       [facility] [address] [address]        Equal Access to Services     [facility]: Hadii aad ku hadasho Soomaali, waaxda luqadaha, qaybta kaalmada adeegyada, fe masters . So [facility] [phone].    ATENCIÓN: Si habla español, tiene a boston disposición servicios gratuitos de asistencia lingüística. Llame al [phone].    We comply with applicable federal civil rights laws and [place] laws. We do not discriminate on the basis of race, color, national origin, age, disability, sex, sexual orientation, or gender identity.            Thank you!     Thank you for choosing [facility]  for your care. Our goal is always to provide you with excellent care. Hearing back from our patients is one way we can continue " to improve our services. Please take a few minutes to complete the written survey that you may receive in the mail after your visit with us. Thank you!             Your Updated Medication List - Protect others around you: Learn how to safely use, store and throw away your medicines at www.disposemymeds.org.          This list is accurate as of: 11/21/17  1:38 PM.  Always use your most recent med list.                   Brand Name Dispense Instructions for use Diagnosis    nicotine 14 MG/24HR 24 hr patch    NICODERM CQ    30 patch    Place 1 patch onto the skin every 24 hours    Smokes and motivated to quit       prenatal multivitamin plus iron 27-0.8 MG Tabs per tablet      Take 1 tablet by mouth daily

## 2017-11-21 NOTE — PROGRESS NOTES
rto 2 wks  fbs at 32  If you think your bag is broken; you bleed like a period; you think you are in labor or you are worried about your baby movement please call the labor and delivery unit at [phone].

## 2017-12-06 ENCOUNTER — PRENATAL OFFICE VISIT (OUTPATIENT)
Dept: OBGYN | Facility: OTHER | Age: 27
End: 2017-12-06
Attending: OBSTETRICS & GYNECOLOGY
Payer: COMMERCIAL

## 2017-12-06 VITALS — DIASTOLIC BLOOD PRESSURE: 60 MMHG | SYSTOLIC BLOOD PRESSURE: 104 MMHG | BODY MASS INDEX: 30.42 KG/M2 | WEIGHT: 161 LBS

## 2017-12-06 DIAGNOSIS — Z34.80 SUPERVISION OF OTHER NORMAL PREGNANCY, ANTEPARTUM: Primary | ICD-10-CM

## 2017-12-06 PROCEDURE — 99207 ZZC PRENATAL VISIT: CPT | Performed by: OBSTETRICS & GYNECOLOGY

## 2017-12-06 NOTE — MR AVS SNAPSHOT
"              After Visit Summary   12/6/2017    Arabella Desai    MRN: [id number]           Patient Information     Date Of Birth          1990        Visit Information        Provider Department      12/6/2017 1:50 PM Kayla Dean MD [facility]        Today's Diagnoses     Supervision of other normal pregnancy, antepartum    -  1      Care Instructions    Return to clinic in 2 weeks  If you think your bag of water is broken; have bleeding like a period; think you are in labor; or are worried about your baby's movement, please call the labor and delivery unit at [phone].            Follow-ups after your visit        Your next 10 appointments already scheduled     Dec 20, 2017  9:10 AM CST   (Arrive by 8:55 AM)   ESTABLISHED PRENATAL with Kayla Dean MD   [facility] [place] ([facility] [place] [facility] )    [address]  [place] [address] [phone]              Who to contact     If you have questions or need follow up information about today's clinic visit or your schedule please contact [facility] directly at [phone].  Normal or non-critical lab and imaging results will be communicated to you by MitoGeneticshart, letter or phone within 4 business days after the clinic has received the results. If you do not hear from us within 7 days, please contact the clinic through MitoGeneticshart or phone. If you have a critical or abnormal lab result, we will notify you by phone as soon as possible.  Submit refill requests through Infinite.ly or call your pharmacy and they will forward the refill request to us. Please allow 3 business days for your refill to be completed.          Additional Information About Your Visit        MyChart Information     Infinite.ly lets you send messages to your doctor, view your test results, renew your prescriptions, schedule appointments and more. To sign up, go to [email or web address] . Click on \"Log in\" on the left side of the screen, which " "will take you to the Welcome page. Then click on \"Sign up Now\" on the right side of the page.     You will be asked to enter the access code listed below, as well as some personal information. Please follow the directions to create your username and password.     Your access code is: [email or web address]  Expires: 2018 12:50 PM     Your access code will  in 90 days. If you need help or a new code, please call your [place] clinic or [phone].        Care EveryWhere ID     This is your Care EveryWhere ID. This could be used by other organizations to access your [place] medical records  [id number]        Your Vitals Were     Last Period BMI (Body Mass Index)                2017 (Approximate) 30.42 kg/m2           Blood Pressure from Last 3 Encounters:   17 104/60   17 102/58   17 106/65    Weight from Last 3 Encounters:   17 161 lb (73 kg)   17 160 lb (72.6 kg)   17 162 lb (73.5 kg)              Today, you had the following     No orders found for display       Primary Care Provider Office Phone # Fax #    NITO Monroy [phone]       [facility] [address] [address]        Equal Access to Services     ERIK SOMMERS : Hadii aad ku hadasho Soomaali, waaxda luqadaha, qaybta kaalmada adeegyada, fe masters . So [facility] [phone].    ATENCIÓN: Si habla español, tiene a boston disposición servicios gratuitos de asistencia lingüística. Kady al [phone].    We comply with applicable federal civil rights laws and [place] laws. We do not discriminate on the basis of race, color, national origin, age, disability, sex, sexual orientation, or gender identity.            Thank you!     Thank you for choosing [facility]  for your care. Our goal is always to provide you with excellent care. Hearing back from our patients is one way we can continue to improve our services. Please take " a few minutes to complete the written survey that you may receive in the mail after your visit with us. Thank you!             Your Updated Medication List - Protect others around you: Learn how to safely use, store and throw away your medicines at www.disposemymeds.org.          This list is accurate as of: 12/6/17 11:59 PM.  Always use your most recent med list.                   Brand Name Dispense Instructions for use Diagnosis    nicotine 14 MG/24HR 24 hr patch    NICODERM CQ    30 patch    Place 1 patch onto the skin every 24 hours    Smokes and motivated to quit       prenatal multivitamin plus iron 27-0.8 MG Tabs per tablet      Take 1 tablet by mouth daily

## 2017-12-07 NOTE — PATIENT INSTRUCTIONS
Return to clinic in 2 weeks  If you think your bag of water is broken; have bleeding like a period; think you are in labor; or are worried about your baby's movement, please call the labor and delivery unit at [phone].

## 2017-12-12 DIAGNOSIS — O99.810 GLUCOSE INTOLERANCE OF PREGNANCY: ICD-10-CM

## 2017-12-12 LAB — GLUCOSE BLD-MCNC: 68 MG/DL (ref 70–99)

## 2017-12-12 PROCEDURE — 82947 ASSAY GLUCOSE BLOOD QUANT: CPT | Performed by: OBSTETRICS & GYNECOLOGY

## 2017-12-12 PROCEDURE — 36416 COLLJ CAPILLARY BLOOD SPEC: CPT | Performed by: OBSTETRICS & GYNECOLOGY

## 2017-12-18 ENCOUNTER — PRENATAL OFFICE VISIT (OUTPATIENT)
Dept: OBGYN | Facility: OTHER | Age: 27
End: 2017-12-18
Attending: OBSTETRICS & GYNECOLOGY
Payer: COMMERCIAL

## 2017-12-18 VITALS — SYSTOLIC BLOOD PRESSURE: 112 MMHG | WEIGHT: 159 LBS | BODY MASS INDEX: 30.04 KG/M2 | DIASTOLIC BLOOD PRESSURE: 60 MMHG

## 2017-12-18 DIAGNOSIS — Z34.80 SUPERVISION OF OTHER NORMAL PREGNANCY, ANTEPARTUM: Primary | ICD-10-CM

## 2017-12-18 PROCEDURE — 99207 ZZC PRENATAL VISIT: CPT | Performed by: OBSTETRICS & GYNECOLOGY

## 2017-12-18 NOTE — MR AVS SNAPSHOT
"              After Visit Summary   12/18/2017    Arabella Desai    MRN: [id number]           Patient Information     Date Of Birth          1990        Visit Information        Provider Department      12/18/2017 10:10 AM Kayla Dean MD [facility] [place] [facility]        Today's Diagnoses     Supervision of other normal pregnancy, antepartum    -  1      Care Instructions    Return to clinic in 2 weeks  If you think your bag of water is broken; have bleeding like a period; think you are in labor; or are worried about your baby's movement, please call the labor and delivery unit at [phone].            Follow-ups after your visit        Who to contact     If you have questions or need follow up information about today's clinic visit or your schedule please contact [facility] directly at [phone].  Normal or non-critical lab and imaging results will be communicated to you by MyChart, letter or phone within 4 business days after the clinic has received the results. If you do not hear from us within 7 days, please contact the clinic through MyChart or phone. If you have a critical or abnormal lab result, we will notify you by phone as soon as possible.  Submit refill requests through [employer] or call your pharmacy and they will forward the refill request to us. Please allow 3 business days for your refill to be completed.          Additional Information About Your Visit        MyChart Information     [employer] lets you send messages to your doctor, view your test results, renew your prescriptions, schedule appointments and more. To sign up, go to [email or web address] . Click on \"Log in\" on the left side of the screen, which will take you to the Welcome page. Then click on \"Sign up Now\" on the right side of the page.     You will be asked to enter the access code listed below, as well as some personal information. Please follow the directions to create your username and password.     Your " access code is: [email or web address]  Expires: 2018 12:50 PM     Your access code will  in 90 days. If you need help or a new code, please call your [place] clinic or [phone].        Care EveryWhere ID     This is your Care EveryWhere ID. This could be used by other organizations to access your [place] medical records  [id number]        Your Vitals Were     Last Period BMI (Body Mass Index)                2017 (Approximate) 30.04 kg/m2           Blood Pressure from Last 3 Encounters:   17 112/60   17 104/60   17 102/58    Weight from Last 3 Encounters:   17 159 lb (72.1 kg)   17 161 lb (73 kg)   17 160 lb (72.6 kg)              Today, you had the following     No orders found for display       Primary Care Provider Office Phone # Fax #    NITO Monroy [phone]       [facility] [address]  [facility] [address]        Equal Access to Services     [facility]: Hadii aad ku hadasho Soomaali, waaxda luqadaha, qaybta kaalmada adeegyada, fe masters . So [facility] [phone].    ATENCIÓN: Si habla español, tiene a boston disposición servicios gratuitos de asistencia lingüística. Kady al [phone].    We comply with applicable federal civil rights laws and [place] laws. We do not discriminate on the basis of race, color, national origin, age, disability, sex, sexual orientation, or gender identity.            Thank you!     Thank you for choosing [facility]  for your care. Our goal is always to provide you with excellent care. Hearing back from our patients is one way we can continue to improve our services. Please take a few minutes to complete the written survey that you may receive in the mail after your visit with us. Thank you!             Your Updated Medication List - Protect others around you: Learn how to safely use, store and throw away your medicines at  www.disposemymeds.org.          This list is accurate as of: 12/18/17 10:20 AM.  Always use your most recent med list.                   Brand Name Dispense Instructions for use Diagnosis    nicotine 14 MG/24HR 24 hr patch    NICODERM CQ    30 patch    Place 1 patch onto the skin every 24 hours    Smokes and motivated to quit       prenatal multivitamin plus iron 27-0.8 MG Tabs per tablet      Take 1 tablet by mouth daily

## 2017-12-18 NOTE — PATIENT INSTRUCTIONS
Return to clinic in 2 weeks  If you think your bag of water is broken; have bleeding like a period; think you are in labor; or are worried about your baby's movement, please call the labor and delivery unit at [phone].

## 2017-12-21 ENCOUNTER — HOSPITAL ENCOUNTER (EMERGENCY)
Facility: HOSPITAL | Age: 27
Discharge: HOME | End: 2017-12-21
Attending: FAMILY MEDICINE | Admitting: FAMILY MEDICINE
Payer: COMMERCIAL

## 2017-12-21 ENCOUNTER — HOSPITAL ENCOUNTER (OUTPATIENT)
Facility: HOSPITAL | Age: 27
Discharge: HOME | End: 2017-12-21
Attending: OBSTETRICS & GYNECOLOGY | Admitting: OBSTETRICS & GYNECOLOGY
Payer: COMMERCIAL

## 2017-12-21 VITALS
WEIGHT: 160 LBS | HEART RATE: 100 BPM | HEIGHT: 61 IN | SYSTOLIC BLOOD PRESSURE: 123 MMHG | RESPIRATION RATE: 16 BRPM | BODY MASS INDEX: 30.21 KG/M2 | DIASTOLIC BLOOD PRESSURE: 68 MMHG | TEMPERATURE: 98.4 F

## 2017-12-21 VITALS
OXYGEN SATURATION: 98 % | HEART RATE: 119 BPM | TEMPERATURE: 98 F | DIASTOLIC BLOOD PRESSURE: 76 MMHG | RESPIRATION RATE: 16 BRPM | SYSTOLIC BLOOD PRESSURE: 124 MMHG
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
## 2017-12-21 PROBLEM — Z36.89 ENCOUNTER FOR TRIAGE IN PREGNANT PATIENT: Status: ACTIVE | Noted: 2017-12-21

## 2017-12-21 PROCEDURE — 59025 FETAL NON-STRESS TEST: CPT

## 2017-12-21 PROCEDURE — 99214 OFFICE O/P EST MOD 30 MIN: CPT | Mod: 25

## 2017-12-21 PROCEDURE — 99282 EMERGENCY DEPT VISIT SF MDM: CPT

## 2017-12-21 PROCEDURE — 59025 FETAL NON-STRESS TEST: CPT | Mod: 26 | Performed by: OBSTETRICS & GYNECOLOGY

## 2017-12-21 PROCEDURE — 99282 EMERGENCY DEPT VISIT SF MDM: CPT | Performed by: FAMILY MEDICINE
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
## 2017-12-21 NOTE — IP AVS SNAPSHOT
MRN:[id number]                      After Visit Summary   12/21/2017    Arabella Desai    MRN: [id number]           Thank you!     Thank you for choosing [facility] for your care. Our goal is always to provide you with excellent care. Hearing back from our patients is one way we can continue to improve our services. Please take a few minutes to complete the written survey that you may receive in the mail after you visit with us. Thank you!        Patient Information     Date Of Birth          1990        About your hospital stay     You were admitted on:  December 21, 2017 You last received care in the:  [facility]    You were discharged on:  December 21, 2017       Who to Call     For medical emergencies, please call 911.  For non-urgent questions about your medical care, please call your primary care provider or clinic, [phone]          Attending Provider     Provider Specialty    Eloy Mccarthy MD OB/Gyn       Primary Care Provider Office Phone # Fax #    NITO Monroy [phone]      Your next 10 appointments already scheduled     Jan 03, 2018  9:10 AM CST   (Arrive by 8:55 AM)   ESTABLISHED PRENATAL with Kayla Dean MD   [facility] [place] ([facility] [place] )    [address] [facility] [address]   [phone]              Further instructions from your care team       Discharge Instructions for Undelivered Patients    Diet:  * Drink 8 to 12 glasses of liquids (milk, juice, water) every day  * You may eat meals and snacks.    Activity:  * Count fetal kicks every day.  * Call your doctor if your baby is moving less than usual.    Call your provider if you notice:  * Swelling in your face or increased swelling in your hands or legs.  * Headaches that are not relieved by Tylenol (acetaminophen).  * Changes in your vision (blurring; seeing spots or stars).  * Nausea (sick to your stomach) and vomiting (throwing up).  *  "Weight gain of 5 pounds per week.  * Heartburn that doesn't go away.  * Signs of bladder infection: Pain when you urinate (use the toilet), needing to go more often or more urgently.  * The bag of kumari (membrane) breaks, or you notice leaking in your underwear.  * Bright red blood in your underwear.  * Abdominal (lower belly) or stomach pain.  * For first baby: Contractions (tightenings) less than 5 minutes apart for one hour or more.  * Second (plus) baby: Contractions (tightenings) less than 10 minutes apart and getting stronger.  * Increase or change in vaginal discharge (note the color and amount).        [facility] [place]: [phone]          Pending Results     No orders found from 2017 to 2017.            Admission Information     Date & Time Provider Department Dept. Phone    2017 Eloy Mccarthy MD [facility] and Delivery [phone]      Your Vitals Were     Blood Pressure Pulse Temperature Respirations Height Weight    123/68 100 98.4  F (36.9  C) (Oral) 16 1.549 m (5' 1\") 72.6 kg (160 lb)    Last Period BMI (Body Mass Index)                2017 (Approximate) 30.23 kg/m2          ONTRAPORT Information     ONTRAPORT lets you send messages to your doctor, view your test results, renew your prescriptions, schedule appointments and more. To sign up, go to [email or web address] . Click on \"Log in\" on the left side of the screen, which will take you to the Welcome page. Then click on \"Sign up Now\" on the right side of the page.     You will be asked to enter the access code listed below, as well as some personal information. Please follow the directions to create your username and password.     Your access code is: [email or web address]  Expires: 2018 12:50 PM     Your access code will  in 90 days. If you need help or a new code, please call your [place] clinic or [phone].        Care EveryWhere ID     This is your Care EveryWhere ID. This could be used by other " organizations to access your [place] medical records  [id number]        Equal Access to Services     ERIK SOMMERS : Hadii domenico Manuel, kathy pritchett, salomedoreen jacksonmarik palacios, fe hoffman. So [facility] [phone].    ATENCIÓN: Si habla español, tiene a boston disposición servicios gratuitos de asistencia lingüística. Llame al [phone].    We comply with applicable federal civil rights laws and [place] laws. We do not discriminate on the basis of race, color, national origin, age, disability, sex, sexual orientation, or gender identity.               Review of your medicines      UNREVIEWED medicines. Ask your doctor about these medicines        Dose / Directions    nicotine 14 MG/24HR 24 hr patch   Commonly known as:  NICODERM CQ   Used for:  Smokes and motivated to quit        Dose:  1 patch   Place 1 patch onto the skin every 24 hours   Quantity:  30 patch   Refills:  1       prenatal multivitamin plus iron 27-0.8 MG Tabs per tablet        Dose:  1 tablet   Take 1 tablet by mouth daily   Refills:  0                Protect others around you: Learn how to safely use, store and throw away your medicines at www.disposemymeds.org.             Medication List: This is a list of all your medications and when to take them. Check marks below indicate your daily home schedule. Keep this list as a reference.      Medications           Morning Afternoon Evening Bedtime As Needed    nicotine 14 MG/24HR 24 hr patch   Commonly known as:  NICODERM CQ   Place 1 patch onto the skin every 24 hours                                prenatal multivitamin plus iron 27-0.8 MG Tabs per tablet   Take 1 tablet by mouth daily
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
## 2017-12-21 NOTE — IP AVS SNAPSHOT
[facility]    [address] [address]    Phone:  [phone]    Fax:  [phone]                                       After Visit Summary   12/21/2017    Arabella Desai    MRN: [id number]           After Visit Summary Signature Page     I have received my discharge instructions, and my questions have been answered. I have discussed any challenges I see with this plan with the nurse or doctor.    ..........................................................................................................................................  Patient/Patient Representative Signature      ..........................................................................................................................................  Patient Representative Print Name and Relationship to Patient    ..................................................               ................................................  Date                                            Time    ..........................................................................................................................................  Reviewed by Signature/Title    ...................................................              ..............................................  Date                                                            Time
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
## 2017-12-21 NOTE — ED AVS SNAPSHOT
[facility]    [address]    [facility] [address]    Phone:  [phone]                                       Arabella Desai   MRN: [id number]    Department:  [facility]   Date of Visit:  12/21/2017           After Visit Summary Signature Page     I have received my discharge instructions, and my questions have been answered. I have discussed any challenges I see with this plan with the nurse or doctor.    ..........................................................................................................................................  Patient/Patient Representative Signature      ..........................................................................................................................................  Patient Representative Print Name and Relationship to Patient    ..................................................               ................................................  Date                                            Time    ..........................................................................................................................................  Reviewed by Signature/Title    ...................................................              ..............................................  Date                                                            Time

## 2017-12-21 NOTE — ED NOTES
Patient discharged to OB with spouse. Patient verbalize/demonstrate understanding of all written and verbal instructions. All questions answered.

## 2017-12-21 NOTE — DISCHARGE INSTRUCTIONS
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
## 2018-01-03 ENCOUNTER — PRENATAL OFFICE VISIT (OUTPATIENT)
Dept: OBGYN | Facility: OTHER | Age: 28
End: 2018-01-03
Attending: OBSTETRICS & GYNECOLOGY
Payer: COMMERCIAL

## 2018-01-03 VITALS — WEIGHT: 158 LBS | SYSTOLIC BLOOD PRESSURE: 100 MMHG | DIASTOLIC BLOOD PRESSURE: 72 MMHG | BODY MASS INDEX: 29.85 KG/M2

## 2018-01-03 DIAGNOSIS — Z34.80 SUPERVISION OF OTHER NORMAL PREGNANCY, ANTEPARTUM: Primary | ICD-10-CM

## 2018-01-03 LAB
ERYTHROCYTE [DISTWIDTH] IN BLOOD BY AUTOMATED COUNT: 13.6 % (ref 10–15)
HCT VFR BLD AUTO: 39.9 % (ref 35–47)
HGB BLD-MCNC: 14 G/DL (ref 11.7–15.7)
MCH RBC QN AUTO: 30.6 PG (ref 26.5–33)
MCHC RBC AUTO-ENTMCNC: 35.1 G/DL (ref 31.5–36.5)
MCV RBC AUTO: 87 FL (ref 78–100)
PLATELET # BLD AUTO: 183 10E9/L (ref 150–450)
RBC # BLD AUTO: 4.58 10E12/L (ref 3.8–5.2)
WBC # BLD AUTO: 10.3 10E9/L (ref 4–11)

## 2018-01-03 PROCEDURE — 36415 COLL VENOUS BLD VENIPUNCTURE: CPT | Performed by: OBSTETRICS & GYNECOLOGY

## 2018-01-03 PROCEDURE — 87653 STREP B DNA AMP PROBE: CPT | Performed by: OBSTETRICS & GYNECOLOGY

## 2018-01-03 PROCEDURE — 99207 ZZC PRENATAL VISIT: CPT | Performed by: OBSTETRICS & GYNECOLOGY

## 2018-01-03 PROCEDURE — 85027 COMPLETE CBC AUTOMATED: CPT | Performed by: OBSTETRICS & GYNECOLOGY

## 2018-01-03 NOTE — PATIENT INSTRUCTIONS
Lab today.  Return to clinic in 1 week.  If you think your bag of water is broken; have bleeding like a period; think you are in labor; or are worried about your baby's movement, please call the labor and delivery unit at [phone].

## 2018-01-03 NOTE — PROGRESS NOTES
36 Week Visit    Patient education provided on the following:  Baby-led feeding  Exclusivity of breastfeeding for the first 6 months  The importance of exclusive breastfeeding  Non-pharmalogical pain relief methods for labor  Frequency of feeding in relation to establishing a milk supply  Continuation of breastfeeding after introduction of appropriate complimentary foods    We reviewed the [place] [employer] [facility] Prenatal Toolkit in the [facility] Resource Book.  Patient questions and concerns addressed and reviewed. Support and encouragement provided.    VERONICA CEDEÑO

## 2018-01-03 NOTE — MR AVS SNAPSHOT
After Visit Summary   1/3/2018    Arabella Desai    MRN: [id number]           Patient Information     Date Of Birth          1990        Visit Information        Provider Department      1/3/2018 9:10 AM [facility]la Dean MD [facility]        Today's Diagnoses     Supervision of other normal pregnancy, antepartum    -  1      Care Instructions    Lab today.  Return to clinic in 1 week.  If you think your bag of water is broken; have bleeding like a period; think you are in labor; or are worried about your baby's movement, please call the labor and delivery unit at [phone].            Follow-ups after your visit        Your next 10 appointments already scheduled     Jan 22, 2018  8:00 AM CST   (Arrive by 7:45 AM)   US OB SINGLE FOLLOW UP REPEAT with HIUS2   HI ULTRASOUND ([facility] )    [address] [place] [id number] [phone]           Please bring a list of your medicines (including vitamins, minerals and over-the-counter drugs). Also, tell your doctor about any allergies you may have. Wear comfortable clothes and leave your valuables at home.  If you re less than 20 weeks drink four 8-ounce glasses of fluid an hour before your exam. If you need to empty your bladder before your exam, try to release only a little urine. Then, drink another glass of fluid.  You may have up to two family members in the exam room. If you bring a small child, an adult must be there to care for him or her.  Please call the Imaging Department at your exam site with any questions.              Who to contact     If you have questions or need follow up information about today's clinic visit or your schedule please contact [facility] directly at [phone].  Normal or non-critical lab and imaging results will be communicated to you by MyChart, letter or phone within 4 business days after the clinic has received the results. If you do not hear from us within 7  "days, please contact the clinic through Mibio or phone. If you have a critical or abnormal lab result, we will notify you by phone as soon as possible.  Submit refill requests through Mibio or call your pharmacy and they will forward the refill request to us. Please allow 3 business days for your refill to be completed.          Additional Information About Your Visit        SamplesainthariSyndica Information     Mibio lets you send messages to your doctor, view your test results, renew your prescriptions, schedule appointments and more. To sign up, go to [email or web address] . Click on \"Log in\" on the left side of the screen, which will take you to the Welcome page. Then click on \"Sign up Now\" on the right side of the page.     You will be asked to enter the access code listed below, as well as some personal information. Please follow the directions to create your username and password.     Your access code is: [email or web address]  Expires: 4/3/2018  9:13 AM     Your access code will  in 90 days. If you need help or a new code, please call your [place] clinic or [phone].        Care EveryWhere ID     This is your Care EveryWhere ID. This could be used by other organizations to access your [place] medical records  [id number]        Your Vitals Were     Last Period BMI (Body Mass Index)                2017 (Approximate) 29.85 kg/m2           Blood Pressure from Last 3 Encounters:   18 100/72   17 123/68   17 124/76    Weight from Last 3 Encounters:   18 158 lb (71.7 kg)   17 160 lb (72.6 kg)   17 159 lb (72.1 kg)              We Performed the Following     CBC with platelets     Group B strep PCR        Primary Care Provider Office Phone # Fax #    NITO Monroy [phone]       [facility] [address] [facility] [address]  [facility] [address]        Equal Access to Services     ERIK SOMMERS AH: Hadii aad maren Manuel, kathy pritchett, qaybta " fe marcial nabor hoffman. So [facility] [phone].    ATENCIÓN: Si janis hearn, tiene a boston disposición servicios gratuitos de asistencia lingüística. Kady al [phone].    We comply with applicable federal civil rights laws and [place] laws. We do not discriminate on the basis of race, color, national origin, age, disability, sex, sexual orientation, or gender identity.            Thank you!     Thank you for choosing [facility]  for your [facility]. Our goal is always to provide you with excellent care. Hearing back from our patients is one way we can continue to improve our services. Please take a few minutes to complete the written survey that you may receive in the mail after your visit with us. Thank you!             Your Updated Medication List - Protect others around you: Learn how to safely use, store and throw away your medicines at www.disposemymeds.org.          This list is accurate as of: 1/3/18  9:23 AM.  Always use your most recent med list.                   Brand Name Dispense Instructions for use Diagnosis    nicotine 14 MG/24HR 24 hr patch    NICODERM CQ    30 patch    Place 1 patch onto the skin every 24 hours    Smokes and motivated to quit       prenatal multivitamin plus iron 27-0.8 MG Tabs per tablet      Take 1 tablet by mouth daily

## 2018-01-04 LAB
GP B STREP DNA SPEC QL NAA+PROBE: NEGATIVE
SPECIMEN SOURCE: NORMAL

## 2018-01-09 ENCOUNTER — PRENATAL OFFICE VISIT (OUTPATIENT)
Dept: OBGYN | Facility: OTHER | Age: 28
End: 2018-01-09
Attending: OBSTETRICS & GYNECOLOGY
Payer: COMMERCIAL

## 2018-01-09 VITALS — DIASTOLIC BLOOD PRESSURE: 68 MMHG | WEIGHT: 158 LBS | BODY MASS INDEX: 29.85 KG/M2 | SYSTOLIC BLOOD PRESSURE: 110 MMHG

## 2018-01-09 DIAGNOSIS — Z34.80 SUPERVISION OF OTHER NORMAL PREGNANCY, ANTEPARTUM: Primary | ICD-10-CM

## 2018-01-09 PROCEDURE — 99207 ZZC PRENATAL VISIT: CPT | Mod: 25 | Performed by: OBSTETRICS & GYNECOLOGY

## 2018-01-09 PROCEDURE — 76815 OB US LIMITED FETUS(S): CPT | Performed by: OBSTETRICS & GYNECOLOGY

## 2018-01-09 NOTE — MR AVS SNAPSHOT
After Visit Summary   1/9/2018    Arabella Desai    MRN: [id number]           Patient Information     Date Of Birth          1990        Visit Information        Provider Department      1/9/2018 1:30 PM Kayla Dean MD [facility]        Today's Diagnoses     Supervision of other normal pregnancy, antepartum    -  1      Care Instructions    Return to clinic in 1 week.  If you think your bag of water is broken; have bleeding like a period; think you are in labor; or are worried about your baby's movement, please call the labor and delivery unit at [phone].            Follow-ups after your visit        Your next 10 appointments already scheduled     Jan 16, 2018  9:10 AM CST   (Arrive by 8:55 AM)   ESTABLISHED PRENATAL with MD [facility] [facility] ([facility] )    [address]  [facility] [id number] [phone]            Jan 22, 2018  8:00 AM CST   (Arrive by 7:45 AM)   US OB SINGLE FOLLOW UP REPEAT with HIUS2   HI ULTRASOUND ([facility] )    [address] [place] [id number] [phone]           Please bring a list of your medicines (including vitamins, minerals and over-the-counter drugs). Also, tell your doctor about any allergies you may have. Wear comfortable clothes and leave your valuables at home.  If you re less than 20 weeks drink four 8-ounce glasses of fluid an hour before your exam. If you need to empty your bladder before your exam, try to release only a little urine. Then, drink another glass of fluid.  You may have up to two family members in the exam room. If you bring a small child, an adult must be there to care for him or her.  Please call the Imaging Department at your exam site with any questions.              Who to contact     If you have questions or need follow up information about today's clinic visit or your schedule please contact [facility] directly at  "[phone].  Normal or non-critical lab and imaging results will be communicated to you by GetAFivehart, letter or phone within 4 business days after the clinic has received the results. If you do not hear from us within 7 days, please contact the clinic through GetAFivehart or phone. If you have a critical or abnormal lab result, we will notify you by phone as soon as possible.  Submit refill requests through Animalvitae or call your pharmacy and they will forward the refill request to us. Please allow 3 business days for your refill to be completed.          Additional Information About Your Visit        GetAFivehart Information     Animalvitae lets you send messages to your doctor, view your test results, renew your prescriptions, schedule appointments and more. To sign up, go to [email or web address] . Click on \"Log in\" on the left side of the screen, which will take you to the Welcome page. Then click on \"Sign up Now\" on the right side of the page.     You will be asked to enter the access code listed below, as well as some personal information. Please follow the directions to create your username and password.     Your access code is: [email or web address]  Expires: 4/3/2018  9:13 AM     Your access code will  in 90 days. If you need help or a new code, please call your [place] clinic or [phone].        Care EveryWhere ID     This is your Care EveryWhere ID. This could be used by other organizations to access your [place] medical records  [id number]        Your Vitals Were     Last Period BMI (Body Mass Index)                2017 (Approximate) 29.85 kg/m2           Blood Pressure from Last 3 Encounters:   18 110/68   18 100/72   17 123/68    Weight from Last 3 Encounters:   18 158 lb (71.7 kg)   18 158 lb (71.7 kg)   17 160 lb (72.6 kg)              We Performed the Following     US OB LIMITED, 1 OR MORE FETUSES        Primary Care Provider Office Phone # Fax #    Katherine Han, " [phone]       [facility] [address]  [place] [address]        Equal Access to Services     ERIK SOMMERS : Hadii aad ku hadbraydonjohanne Adriennebrianna, waameliada luqbrandi, reneeta kaalmada suzanne, fe luis min hayaamanuel solaresmickey tomlin sonam hoffman. So [facility] [phone].    ATENCIÓN: Si habla español, tiene a boston disposición servicios gratuitos de asistencia lingüística. Llame al [phone].    We comply with applicable federal civil rights laws and [place] laws. We do not discriminate on the basis of race, color, national origin, age, disability, sex, sexual orientation, or gender identity.            Thank you!     Thank you for choosing [facility]  for your care. Our goal is always to provide you with excellent care. Hearing back from our patients is one way we can continue to improve our services. Please take a few minutes to complete the written survey that you may receive in the mail after your visit with us. Thank you!             Your Updated Medication List - Protect others around you: Learn how to safely use, store and throw away your medicines at www.disposemymeds.org.          This list is accurate as of: 1/9/18  2:54 PM.  Always use your most recent med list.                   Brand Name Dispense Instructions for use Diagnosis    nicotine 14 MG/24HR 24 hr patch    NICODERM CQ    30 patch    Place 1 patch onto the skin every 24 hours    Smokes and motivated to quit       prenatal multivitamin plus iron 27-0.8 MG Tabs per tablet      Take 1 tablet by mouth daily

## 2018-01-09 NOTE — PATIENT INSTRUCTIONS
Return to clinic in 1 week.  If you think your bag of water is broken; have bleeding like a period; think you are in labor; or are worried about your baby's movement, please call the labor and delivery unit at [phone].

## 2018-01-16 ENCOUNTER — PRENATAL OFFICE VISIT (OUTPATIENT)
Dept: OBGYN | Facility: OTHER | Age: 28
End: 2018-01-16
Attending: OBSTETRICS & GYNECOLOGY
Payer: COMMERCIAL

## 2018-01-16 VITALS — SYSTOLIC BLOOD PRESSURE: 100 MMHG | DIASTOLIC BLOOD PRESSURE: 68 MMHG | BODY MASS INDEX: 29.95 KG/M2 | WEIGHT: 158.5 LBS

## 2018-01-16 DIAGNOSIS — Z34.80 SUPERVISION OF OTHER NORMAL PREGNANCY, ANTEPARTUM: Primary | ICD-10-CM

## 2018-01-16 PROCEDURE — 99207 ZZC PRENATAL VISIT: CPT | Performed by: OBSTETRICS & GYNECOLOGY

## 2018-01-16 NOTE — PATIENT INSTRUCTIONS
Return to clinic in 1 week.  If you think your bag of water is broken; have bleeding like a period; think you are in labor; or are worried about your baby's movement, please call the labor and delivery unit at [phone].

## 2018-01-16 NOTE — MR AVS SNAPSHOT
After Visit Summary   1/16/2018    Arabella Desai    MRN: [id number]           Patient Information     Date Of Birth          1990        Visit Information        Provider Department      1/16/2018 9:10 AM Kayla Dean MD [facility]        Today's Diagnoses     Supervision of other normal pregnancy, antepartum    -  1      Care Instructions    Return to clinic in 1 week.  If you think your bag of water is broken; have bleeding like a period; think you are in labor; or are worried about your baby's movement, please call the labor and delivery unit at [phone].            Follow-ups after your visit        Your next 10 appointments already scheduled     Jan 22, 2018  8:00 AM CST   (Arrive by 7:45 AM)   US OB SINGLE FOLLOW UP REPEAT with HIUS2   HI ULTRASOUND ([facility] )    [address] [place] [id number] [phone]           Please bring a list of your medicines (including vitamins, minerals and over-the-counter drugs). Also, tell your doctor about any allergies you may have. Wear comfortable clothes and leave your valuables at home.  If you re less than 20 weeks drink four 8-ounce glasses of fluid an hour before your exam. If you need to empty your bladder before your exam, try to release only a little urine. Then, drink another glass of fluid.  You may have up to two family members in the exam room. If you bring a small child, an adult must be there to care for him or her.  Please call the Imaging Department at your exam site with any questions.            Jan 23, 2018  9:10 AM CST   (Arrive by 8:55 AM)   ESTABLISHED PRENATAL with MD [facility] [place] [facility] ([facility] )    [address]  [facility] [id number] [phone]              Who to contact     If you have questions or need follow up information about today's clinic visit or your schedule please contact [facility] directly at  "[phone].  Normal or non-critical lab and imaging results will be communicated to you by TransEngenhart, letter or phone within 4 business days after the clinic has received the results. If you do not hear from us within 7 days, please contact the clinic through TransEngenhart or phone. If you have a critical or abnormal lab result, we will notify you by phone as soon as possible.  Submit refill requests through Kereos or call your pharmacy and they will forward the refill request to us. Please allow 3 business days for your refill to be completed.          Additional Information About Your Visit        TransEngenhart Information     Kereos lets you send messages to your doctor, view your test results, renew your prescriptions, schedule appointments and more. To sign up, go to [email or web address] . Click on \"Log in\" on the left side of the screen, which will take you to the Welcome page. Then click on \"Sign up Now\" on the right side of the page.     You will be asked to enter the access code listed below, as well as some personal information. Please follow the directions to create your username and password.     Your access code is: [email or web address]  Expires: 4/3/2018  9:13 AM     Your access code will  in 90 days. If you need help or a new code, please call your [place] clinic or [phone].        Care EveryWhere ID     This is your Care EveryWhere ID. This could be used by other organizations to access your [place] medical records  [id number]        Your Vitals Were     Last Period BMI (Body Mass Index)                2017 (Approximate) 29.95 kg/m2           Blood Pressure from Last 3 Encounters:   18 100/68   18 110/68   18 100/72    Weight from Last 3 Encounters:   18 158 lb 8 oz (71.9 kg)   18 158 lb (71.7 kg)   18 158 lb (71.7 kg)              Today, you had the following     No orders found for display       Primary Care Provider Office Phone # Fax #    Katherine ADAMS " NITO Han [phone]       [facility] [address]  [place] [address]        Equal Access to Services     ERIK SOMMERS : Hadii aad ku hadbraydonjoahnne Sobrianna, waameliada luqadaha, qaybta kaalmada adecullen, fe luis min hayaamanuel solaresmickey tomlin sonam hoffman. So [facility] [phone].    ATENCIÓN: Si habla español, tiene a boston disposición servicios gratuitos de asistencia lingüística. Llame al [phone].    We comply with applicable federal civil rights laws and [place] laws. We do not discriminate on the basis of race, color, national origin, age, disability, sex, sexual orientation, or gender identity.            Thank you!     Thank you for choosing [facility]  for your care. Our goal is always to provide you with excellent care. Hearing back from our patients is one way we can continue to improve our services. Please take a few minutes to complete the written survey that you may receive in the mail after your visit with us. Thank you!             Your Updated Medication List - Protect others around you: Learn how to safely use, store and throw away your medicines at www.disposemymeds.org.          This list is accurate as of: 1/16/18 11:06 AM.  Always use your most recent med list.                   Brand Name Dispense Instructions for use Diagnosis    nicotine 14 MG/24HR 24 hr patch    NICODERM CQ    30 patch    Place 1 patch onto the skin every 24 hours    Smokes and motivated to quit       prenatal multivitamin plus iron 27-0.8 MG Tabs per tablet      Take 1 tablet by mouth daily

## 2018-01-18 ENCOUNTER — HOSPITAL ENCOUNTER (EMERGENCY)
Facility: HOSPITAL | Age: 28
Discharge: HOME | End: 2018-01-18
Attending: NURSE PRACTITIONER | Admitting: NURSE PRACTITIONER
Payer: COMMERCIAL

## 2018-01-18 VITALS
HEART RATE: 100 BPM | TEMPERATURE: 98.9 F | OXYGEN SATURATION: 98 % | RESPIRATION RATE: 18 BRPM | SYSTOLIC BLOOD PRESSURE: 123 MMHG | DIASTOLIC BLOOD PRESSURE: 65 MMHG

## 2018-01-18 DIAGNOSIS — J10.1 INFLUENZA A: ICD-10-CM

## 2018-01-18 LAB
FLUAV+FLUBV AG SPEC QL: NEGATIVE
FLUAV+FLUBV AG SPEC QL: POSITIVE
SPECIMEN SOURCE: ABNORMAL

## 2018-01-18 PROCEDURE — 99213 OFFICE O/P EST LOW 20 MIN: CPT | Performed by: NURSE PRACTITIONER

## 2018-01-18 PROCEDURE — 87804 INFLUENZA ASSAY W/OPTIC: CPT | Mod: 59 | Performed by: FAMILY MEDICINE

## 2018-01-18 PROCEDURE — G0463 HOSPITAL OUTPT CLINIC VISIT: HCPCS

## 2018-01-18 RX ORDER — OSELTAMIVIR PHOSPHATE 75 MG/1
75 CAPSULE ORAL 2 TIMES DAILY
Qty: 10 CAPSULE | Refills: 0 | Status: SHIPPED | OUTPATIENT
Start: 2018-01-18 | End: 2018-01-23

## 2018-01-18 ASSESSMENT — ENCOUNTER SYMPTOMS
WHEEZING: 0
FEVER: 1
DIARRHEA: 0
FATIGUE: 1
MYALGIAS: 1
CHILLS: 0
HEADACHES: 1
ABDOMINAL PAIN: 0
APPETITE CHANGE: 0
COUGH: 1
VOMITING: 0
SINUS PAIN: 0
SHORTNESS OF BREATH: 0
ARTHRALGIAS: 1
SINUS PRESSURE: 0
DYSURIA: 0
NAUSEA: 0
SORE THROAT: 1

## 2018-01-18 NOTE — DISCHARGE INSTRUCTIONS
Influenza (Adult)    Influenza is also called the flu. It is a viral illness that affects the air passages of your lungs. It is different from the common cold. The flu can easily be passed from one to person to another. It may be spread through the air by coughing and sneezing. Or it can be spread by touching the sick person and then touching your own eyes, nose, or mouth.  The flu starts 1 to 3 days after you are exposed to the flu virus. It may last for 1 to 2 weeks but many people feel tired or fatigued for many weeks afterward. You usually don t need to take antibiotics unless you have a complication. This might be an ear or sinus infection or pneumonia.  Symptoms of the flu may be mild or severe. They can include extreme tiredness (wanting to stay in bed all day), chills, fevers, muscle aches, soreness with eye movement, headache, and a dry, hacking cough.  Home care  Follow these guidelines when caring for yourself at home:    Avoid being around cigarette smoke, whether yours or other people s.    Acetaminophen or ibuprofen will help ease your fever, muscle aches, and headache. Don t give aspirin to anyone younger than 18 who has the flu. Aspirin can harm the liver.    Nausea and loss of appetite are common with the flu. Eat light meals. Drink 6 to 8 glasses of liquids every day. Good choices are water, sport drinks, soft drinks without caffeine, juices, tea, and soup. Extra fluids will also help loosen secretions in your nose and lungs.    Over-the-counter cold medicines will not make the flu go away faster. But the medicines may help with coughing, sore throat, and congestion in your nose and sinuses. Don t use a decongestant if you have high blood pressure.    Stay home until your fever has been gone for at least 24 hours without using medicine to reduce fever.  Follow-up care  Follow up with your healthcare provider, or as advised, if you are not getting better over the next week.  If you are age 65 or  older, talk with your provider about getting a pneumococcal vaccine every 5 years. You should also get this vaccine if you have chronic asthma or COPD. All adults should get a flu vaccine every fall. Ask your provider about this.  When to seek medical advice  Call your healthcare provider right away if any of these occur:    Cough with lots of colored mucus (sputum) or blood in your mucus    Chest pain, shortness of breath, wheezing, or trouble breathing    Severe headache, or face, neck, or ear pain    New rash with fever    Fever of 100.4 F (38 C) or higher, or as directed by your healthcare provider    Confusion, behavior change, or seizure    Severe weakness or dizziness    You get a new fever or cough after getting better for a few days  Date Last Reviewed: 1/1/2017 2000-2017 [employer]. [address] [place] [address], [place] [address]. All rights reserved. This information is not intended as a substitute for professional medical care. Always follow your healthcare professional's instructions.          Influenza (Flu) and Pregnancy  Influenza ( the flu ) is an infection of the respiratory tract. The tract is made up of your mouth, nose, and lungs, and the passages between them. The flu can make a pregnant woman very ill. This is because pregnant women are at high risk for flu complications. These complications include sinus infections and serious lung infections such as bronchitis and pneumonia. In rare cases, the flu can lead to miscarriage of the baby or even death of the mother. This sheet tells you more about the flu, what to do if you come down with the flu, and what you can do to avoid infection.     Washing your hands often with soap and water can help keep you from catching the flu virus.   Who is at risk for the flu?  Anyone can get the flu. But you are more likely to catch the flu if you:    Are often around young children    Work in a healthcare setting where you may be exposed to flu  germs    Live or work with someone who has the flu    Haven t had an annual flu shot  How does the flu spread?  The flu is caused by a type of germ called a virus. The germ spreads through the air in droplets when someone who has the flu coughs, sneezes, laughs, or talks. You can become infected when you inhale the germ directly. You can also become infected when you touch a surface on which the droplets have landed and then touch your eyes, nose, or mouth. Touching used tissues, or sharing utensils, drinking glasses, or a toothbrush with an infected person can expose you to the flu germ, too.  What are the symptoms of the flu?  Flu symptoms tend to come on quickly and may last a few days to a few weeks. They include:    Fever that's usually higher than 100.4 F (38 C) and chills    Sore throat and headache    Dry cough    Runny nose    Tiredness and weakness    Body and muscle aches  If you are pregnant and have flu-like symptoms  Here are some suggestions:    Call your healthcare provider right away. Follow any instructions your healthcare provider gives you.    You may be asked to get tested to confirm that you have the flu.    Your healthcare provider may prescribe medicines called antivirals. These medicines must be taken within 2 days of when your symptoms started. In some cases, your healthcare provider may not wait for test results to come back before starting you on antivirals. These medicines work by stopping the flu virus from reproducing in your body. This gives your body s immune system a chance to fight the virus. After taking the medicine, your symptoms may be milder and you may recover quicker than without the medicine. The medicine may also prevent serious complications, like pneumonia.    If you feel you need medicines to relieve symptoms, ask your healthcare provider which ones are safe for you to take.  Easing flu symptoms    Drink lots of fluids such as water, juice, and warm soup to prevent  dehydration. A good rule is to drink enough so that you urinate your normal amount. Feeling dizzy or lightheaded most likely means you need to drink more fluid.    Get plenty of rest.    If you aren't hungry, eat smaller meals more often during the day to make sure you get enough calories.    If you don't have a fever, put warm compresses on your forehead or sinuses to relieve congestion.    Call your healthcare provider if you become short of breath.  Preventing the flu    Get vaccinated. One of the best ways to avoid the flu is to get a flu vaccine. Pregnant women can safely get a flu shot. But pregnant women should not get the nasal spray vaccine for the flu. This is a live-virus vaccine and may be harmful to the baby. The nasal spray is not recommended for the 5234-1049 flu season. The CDC says this is because the nasal spray did not seem to protect against the flu over the last several flu seasons. In the past, it was meant for non-pregnant people ages 2 to 49.    Wash your hands often. Frequent handwashing is a proven way to prevent infection. Carry an alcohol-based hand gel containing at least 60% alcohol. Use it when you don t have access to soap and water.    Clean items you use often with disinfectant wipes. This includes phones, computer keyboards, and toys.    Avoid crowds and children as much as possible while you are pregnant. Stay away from anyone who has the flu.  Tips for good handwashing  Handwashing is one of the best ways to prevent many common infections. Follow these steps for more effective handwashing:    Use warm water and plenty of soap. Work up a good lather.    Clean the whole hand, under your nails, between your fingers, and up the wrists.    Wash for at least 20 seconds. Don t just wipe--scrub well.    Rinse, letting the water run down your fingers, not up your wrists.    Dry your hands well. Use a paper towel to turn off the faucet and open the door.  Using alcohol-based hand  gels  Alcohol-based hand gels are also a good choice for cleaning your hands. Use them when you don t have access to soap and water, or your hands don't look dirty. Follow these steps:    Squeeze about a tablespoon of gel into the palm of one hand.    Rub your hands together briskly, cleaning the backs of your hands, the palms, between your fingers, and up the wrists.    Rub until the gel is gone and your hands are completely dry.   Date Last Reviewed: 8/9/2015 2000-2017 [employer] [address] [place], [place] [id number]. All rights reserved. This information is not intended as a substitute for professional medical care. Always follow your healthcare professional's instructions.

## 2018-01-18 NOTE — ED AVS SNAPSHOT
[facility]    [address]    [place] [address]    Phone:  [phone]                                       Arabella Desai   MRN: [id number]    Department:  [facility]   Date of Visit:  1/18/2018           Patient Information     Date Of Birth          1990        Your diagnoses for this visit were:     Influenza A        You were seen by Elen Tam NP.      Follow-up Information     Follow up with [facility].    Specialty:  EMERGENCY MEDICINE    Why:  As needed, If symptoms worsen, or concerns develop    Contact information:    [address]  [place] [id number] [phone]    Additional information:    From [place]: Take [address]. Turn left at [address]. Turn left at the first stoplight on [address]. At the first stop sign, take a right onto [address]. Take a left into the parking lot and continue through until you reach the North enterance of the building.       From [place]: Take [address]. Take the [address] ramp towards [place]. Turn left onto [address]. Take a slight right onto [address]. Turn left at the first stoplight on [address]. At the first stop sign, take a right onto [address]. Take a left into the parking lot and continue through until you reach the North enterance of the building.       From [place]: Take [address]. Take a right at [address]. At the first stop sign, take a right onto [address]. Take a left into the parking lot and continue through until you reach the North enterance of the building.         Follow up with Katherine Han PA.    Specialty:  Family Practice    Why:  As needed, if symptoms do not improve    Contact information:    [facility]  [address]  [place] [address]  [phone]          Discharge Instructions         Influenza (Adult)    Influenza is also called  the flu. It is a viral illness that affects the air passages of your lungs. It is different from the common cold. The flu can easily be passed from one to person to another. It may be spread through the air by coughing and sneezing. Or it can be spread by touching the sick person and then touching your own eyes, nose, or mouth.  The flu starts 1 to 3 days after you are exposed to the flu virus. It may last for 1 to 2 weeks but many people feel tired or fatigued for many weeks afterward. You usually don t need to take antibiotics unless you have a complication. This might be an ear or sinus infection or pneumonia.  Symptoms of the flu may be mild or severe. They can include extreme tiredness (wanting to stay in bed all day), chills, fevers, muscle aches, soreness with eye movement, headache, and a dry, hacking cough.  Home care  Follow these guidelines when caring for yourself at home:    Avoid being around cigarette smoke, whether yours or other people s.    Acetaminophen or ibuprofen will help ease your fever, muscle aches, and headache. Don t give aspirin to anyone younger than 18 who has the flu. Aspirin can harm the liver.    Nausea and loss of appetite are common with the flu. Eat light meals. Drink 6 to 8 glasses of liquids every day. Good choices are water, sport drinks, soft drinks without caffeine, juices, tea, and soup. Extra fluids will also help loosen secretions in your nose and lungs.    Over-the-counter cold medicines will not make the flu go away faster. But the medicines may help with coughing, sore throat, and congestion in your nose and sinuses. Don t use a decongestant if you have high blood pressure.    Stay home until your fever has been gone for at least 24 hours without using medicine to reduce fever.  Follow-up care  Follow up with your healthcare provider, or as advised, if you are not getting better over the next week.  If you are age 65 or older, talk with your provider about getting a  pneumococcal vaccine every 5 years. You should also get this vaccine if you have chronic asthma or COPD. All adults should get a flu vaccine every fall. Ask your provider about this.  When to seek medical advice  Call your healthcare provider right away if any of these occur:    Cough with lots of colored mucus (sputum) or blood in your mucus    Chest pain, shortness of breath, wheezing, or trouble breathing    Severe headache, or face, neck, or ear pain    New rash with fever    Fever of 100.4 F (38 C) or higher, or as directed by your healthcare provider    Confusion, behavior change, or seizure    Severe weakness or dizziness    You get a new fever or cough after getting better for a few days  Date Last Reviewed: 1/1/2017 2000-2017 [email or web address]. [address] [place] [address], [place] [address]. All rights reserved. This information is not intended as a substitute for professional medical care. Always follow your healthcare professional's instructions.          Influenza (Flu) and Pregnancy  Influenza ( the flu ) is an infection of the respiratory tract. The tract is made up of your mouth, nose, and lungs, and the passages between them. The flu can make a pregnant woman very ill. This is because pregnant women are at high risk for flu complications. These complications include sinus infections and serious lung infections such as bronchitis and pneumonia. In rare cases, the flu can lead to miscarriage of the baby or even death of the mother. This sheet tells you more about the flu, what to do if you come down with the flu, and what you can do to avoid infection.     Washing your hands often with soap and water can help keep you from catching the flu virus.   Who is at risk for the flu?  Anyone can get the flu. But you are more likely to catch the flu if you:    Are often around young children    Work in a healthcare setting where you may be exposed to flu germs    Live or work with someone who has the  flu    Haven t had an annual flu shot  How does the flu spread?  The flu is caused by a type of germ called a virus. The germ spreads through the air in droplets when someone who has the flu coughs, sneezes, laughs, or talks. You can become infected when you inhale the germ directly. You can also become infected when you touch a surface on which the droplets have landed and then touch your eyes, nose, or mouth. Touching used tissues, or sharing utensils, drinking glasses, or a toothbrush with an infected person can expose you to the flu germ, too.  What are the symptoms of the flu?  Flu symptoms tend to come on quickly and may last a few days to a few weeks. They include:    Fever that's usually higher than 100.4 F (38 C) and chills    Sore throat and headache    Dry cough    Runny nose    Tiredness and weakness    Body and muscle aches  If you are pregnant and have flu-like symptoms  Here are some suggestions:    Call your healthcare provider right away. Follow any instructions your healthcare provider gives you.    You may be asked to get tested to confirm that you have the flu.    Your healthcare provider may prescribe medicines called antivirals. These medicines must be taken within 2 days of when your symptoms started. In some cases, your healthcare provider may not wait for test results to come back before starting you on antivirals. These medicines work by stopping the flu virus from reproducing in your body. This gives your body s immune system a chance to fight the virus. After taking the medicine, your symptoms may be milder and you may recover quicker than without the medicine. The medicine may also prevent serious complications, like pneumonia.    If you feel you need medicines to relieve symptoms, ask your healthcare provider which ones are safe for you to take.  Easing flu symptoms    Drink lots of fluids such as water, juice, and warm soup to prevent dehydration. A good rule is to drink enough so that  you urinate your normal amount. Feeling dizzy or lightheaded most likely means you need to drink more fluid.    Get plenty of rest.    If you aren't hungry, eat smaller meals more often during the day to make sure you get enough calories.    If you don't have a fever, put warm compresses on your forehead or sinuses to relieve congestion.    Call your healthcare provider if you become short of breath.  Preventing the flu    Get vaccinated. One of the best ways to avoid the flu is to get a flu vaccine. Pregnant women can safely get a flu shot. But pregnant women should not get the nasal spray vaccine for the flu. This is a live-virus vaccine and may be harmful to the baby. The nasal spray is not recommended for the 7806-2162 flu season. The CDC says this is because the nasal spray did not seem to protect against the flu over the last several flu seasons. In the past, it was meant for non-pregnant people ages 2 to 49.    Wash your hands often. Frequent handwashing is a proven way to prevent infection. Carry an alcohol-based hand gel containing at least 60% alcohol. Use it when you don t have access to soap and water.    Clean items you use often with disinfectant wipes. This includes phones, computer keyboards, and toys.    Avoid crowds and children as much as possible while you are pregnant. Stay away from anyone who has the flu.  Tips for good handwashing  Handwashing is one of the best ways to prevent many common infections. Follow these steps for more effective handwashing:    Use warm water and plenty of soap. Work up a good lather.    Clean the whole hand, under your nails, between your fingers, and up the wrists.    Wash for at least 20 seconds. Don t just wipe--scrub well.    Rinse, letting the water run down your fingers, not up your wrists.    Dry your hands well. Use a paper towel to turn off the faucet and open the door.  Using alcohol-based hand gels  Alcohol-based hand gels are also a good choice for  cleaning your hands. Use them when you don t have access to soap and water, or your hands don't look dirty. Follow these steps:    Squeeze about a tablespoon of gel into the palm of one hand.    Rub your hands together briskly, cleaning the backs of your hands, the palms, between your fingers, and up the wrists.    Rub until the gel is gone and your hands are completely dry.   Date Last Reviewed: 8/9/2015 2000-2017 [employer]. [address] [place] [address]. All rights reserved. This information is not intended as a substitute for professional medical care. Always follow your healthcare professional's instructions.          Future Appointments        Provider Department Dept Phone Center    1/22/2018 8:00 AM [facility] [phone] [facility]    1/23/2018 9:10 AM Kayla Dean MD, MD [facility] [phone] [facility]         Review of your medicines      START taking        Dose / Directions Last dose taken    oseltamivir 75 MG capsule   Commonly known as:  TAMIFLU   Dose:  75 mg   Quantity:  10 capsule        Take 1 capsule (75 mg) by mouth 2 times daily for 5 days   Refills:  0          Our records show that you are taking the medicines listed below. If these are incorrect, please call your family doctor or clinic.        Dose / Directions Last dose taken    nicotine 14 MG/24HR 24 hr patch   Commonly known as:  NICODERM CQ   Dose:  1 patch   Quantity:  30 patch        Place 1 patch onto the skin every 24 hours   Refills:  1        prenatal multivitamin plus iron 27-0.8 MG Tabs per tablet   Dose:  1 tablet        Take 1 tablet by mouth daily   Refills:  0                Prescriptions were sent or printed at these locations (1 Prescription)                   [facility] - [place] - [address]   [address] [address]    Telephone:  [phone]   Fax:  [phone]   Hours:                   "Printed at Department/Unit printer ()         oseltamivir (TAMIFLU) 75 MG capsule                Procedures and tests performed during your visit     Influenza A/B antigen      Orders Needing Specimen Collection     None      Pending Results     No orders found from 2018 to 2018.            Pending Culture Results     No orders found from 2018 to 2018.            Thank you for choosing [facility]       Thank you for choosing [facility] for your care. Our goal is always to provide you with excellent care. Hearing back from our patients is one way we can continue to improve our services. Please take a few minutes to complete the written survey that you may receive in the mail after you visit with us. Thank you!        [facility] Information     [employer] lets you send messages to your doctor, view your test results, renew your prescriptions, schedule appointments and more. To sign up, go to [email or web address] . Click on \"Log in\" on the left side of the screen, which will take you to the Welcome page. Then click on \"Sign up Now\" on the right side of the page.     You will be asked to enter the access code listed below, as well as some personal information. Please follow the directions to create your username and password.     Your access code is: [email or web address]  Expires: 4/3/2018  9:13 AM     Your access code will  in 90 days. If you need help or a new code, please call your [facility] clinic or [phone].        Care EveryWhere ID     This is your Care EveryWhere ID. This could be used by other organizations to access your [facility] medical records  [id number]        Equal Access to Services     [facility] : Hadii domenico Manuel, waameliada lujesusadaha, qaybta kaalfe quiroz . So [facility] [phone].    ATENCIÓN: Si habla español, tiene a boston disposición servicios gratuitos de asistencia lingüística. Llame al [phone].    We comply with " applicable federal civil rights laws and [place] laws. We do not discriminate on the basis of race, color, national origin, age, disability, sex, sexual orientation, or gender identity.            After Visit Summary       This is your record. Keep this with you and show to your community pharmacist(s) and doctor(s) at your next visit.

## 2018-01-18 NOTE — ED PROVIDER NOTES
History     Chief Complaint   Patient presents with     Flu Symptoms     37 weeks pregnant. Onset of fever, cough, weakness yesterday. Fever up to 101.5. Drinking fluids/eating.      The history is provided by the patient. No  was used.     Arabella Desai is a 27 year old female who presents today with a CC of fever, cough, headache, body aches since yesterday.  She has been taking tylenol for symptoms with improvement.  She is 37 weeks 5 days pregnant.  .  She was not vaccinated for influenza this season.  No pregnancy concerns.  She is feeling baby move well.  She denies abdominal cramping or vaginal bleeding.      Problem List:    Patient Active Problem List    Diagnosis Date Noted     Encounter for triage in pregnant patient 2017     Priority: Medium     Chronic cough 2017     Priority: Medium     Glucose intolerance of pregnancy 2017     Priority: Medium     EFW 38 wks       Chronic seasonal allergic rhinitis due to pollen 2017     Priority: Medium     Increased nuchal translucency space on fetal ultrasound 2017     Priority: Medium     Neg cell free DNA       Abnormal ultrasound 2017     Priority: Medium     Cell free DNA neg        (spontaneous vaginal delivery) 2016     Priority: Medium     Dean old scar       History of depression 2015     Priority: Medium     Grief and postpartum. Patient believes pp depression may have been associated with breast feeding aggression from nurses       Need for influenza vaccination 2015     Priority: Medium     Defers 17         Need for Tdap vaccination 2015     Priority: Medium     DONE 17       Supervision of other normal pregnancy, antepartum 2015     Priority: Medium     STRETCH Dr. Kaminski          Proteinuria 2015     Priority: Medium     Tobacco use disorder 2012     Priority: Medium     Pneumovax done 7/10/17          Past Medical History:     Past Medical History:   Diagnosis Date     Chronic interstitial cystitis 8/12/2011     Tobacco use disorder 5/17/2012       Past Surgical History:    Past Surgical History:   Procedure Laterality Date     CYSTOSCOPY         Family History:    Family History   Problem Relation Age of Onset     Other - See Comments Paternal Grandmother      brain aneurysm     CANCER Maternal Grandmother      lung     DIABETES Maternal Grandmother      HEART DISEASE Other 70     myocardial infarction       Social History:  Marital Status:   [2]  Social History   Substance Use Topics     Smoking status: Former Smoker     Packs/day: 0.50     Types: Cigarettes     Smokeless tobacco: Never Used      Comment: RX for patches done. declines Quitline referral     Alcohol use No      Comment: occasional        Medications:      oseltamivir (TAMIFLU) 75 MG capsule   nicotine (NICODERM CQ) 14 MG/24HR 24 hr patch   Prenatal Vit-Fe Fumarate-FA (PRENATAL MULTIVITAMIN  PLUS IRON) 27-0.8 MG TABS per tablet   [DISCONTINUED] docusate sodium (COLACE) 100 MG capsule         Review of Systems   Constitutional: Positive for fatigue and fever. Negative for appetite change and chills.   HENT: Positive for sore throat. Negative for ear pain, sinus pain and sinus pressure.    Respiratory: Positive for cough. Negative for shortness of breath and wheezing.    Gastrointestinal: Negative for abdominal pain, diarrhea, nausea and vomiting.   Genitourinary: Negative for dysuria.   Musculoskeletal: Positive for arthralgias and myalgias.   Skin: Negative for rash.   Neurological: Positive for headaches.       Physical Exam   BP: 123/65  Pulse: 100  Temp: 98.9  F (37.2  C)  Resp: 18  SpO2: 98 %      Physical Exam   Constitutional: She is oriented to person, place, and time. She appears well-developed. She is cooperative.  Non-toxic appearance. She does not appear ill. No distress.   HENT:   Head: Normocephalic and atraumatic.   Eyes: Conjunctivae are normal.    Neck: Normal range of motion. Neck supple.   Cardiovascular: Normal rate and regular rhythm.    Pulmonary/Chest: Effort normal and breath sounds normal.   Musculoskeletal: Normal range of motion.   Lymphadenopathy:     She has no cervical adenopathy.   Neurological: She is alert and oriented to person, place, and time.   Skin: Skin is warm and dry.   Psychiatric: She has a normal mood and affect. Her behavior is normal.   Nursing note and vitals reviewed.   BPM    ED Course     ED Course     Procedures    Results for orders placed or performed during the hospital encounter of 01/18/18   Influenza A/B antigen   Result Value Ref Range    Influenza A/B Agn Specimen Midstream Urine     Influenza A Positive (A) NEG^Negative    Influenza B Negative NEG^Negative     Assessments & Plan (with Medical Decision Making)     I have reviewed the nursing notes.    I have reviewed the findings, diagnosis, plan and need for follow up with the patient.  C/T Dr Padilla to discuss patient HPI, exam and plan.  He recommends she can be treated with Tamiflu, keep scheduled follow up with OB    ASSESSMENT / PLAN:  (J10.1) Influenza A  Comment: symptomatic but suprisingly looks pretty good, she is bright and smiling, lungs CTA, VS reassuring.  She is eating and drinking well, bowel and bladder working well.  No pregnancy concerns.  FHt 143  Plan:  Tamiflu as prescribed   Patient verbally educated and given appropriate education sheets for each of their diagnoses and has no questions.   Symptomatic treatments such as those listed below are recommended as needed:   Take OTC tylenol as directed on the bottle as needed.   Cool mist humidifier at bedside    May try safely elevating HOB or sleeping in recliner   Take OTC cold medicine as directed on bottle - use only those OTC products approved in pregnancy   Frequent sips of non-caffeine fluids, rest, wash hands often   Sinus rinses such as a netti pot may help with sinus  symptoms   Return to ED/UC if symptoms worsen or concerns develop: shortness of breath,     chest pain, unable to control fever < 103 with medications, persistent vomiting, signs/symptoms of dehydration.   If symptoms persist follow up with PCP for re-evaluation.      Discharge Medication List as of 1/18/2018  2:31 PM      START taking these medications    Details   oseltamivir (TAMIFLU) 75 MG capsule Take 1 capsule (75 mg) by mouth 2 times daily for 5 days, Disp-10 capsule, R-0, Local Print             Final diagnoses:   Influenza A       1/18/2018   [facility]     Elen Tam, CAMPBELL  01/20/18 1924

## 2018-01-18 NOTE — ED AVS SNAPSHOT
[facility]    [address]    [place] [address]    Phone:  [phone]                                       Arabella Desai   MRN: [id number]    Department:  [facility]   Date of Visit:  1/18/2018           After Visit Summary Signature Page     I have received my discharge instructions, and my questions have been answered. I have discussed any challenges I see with this plan with the nurse or doctor.    ..........................................................................................................................................  Patient/Patient Representative Signature      ..........................................................................................................................................  Patient Representative Print Name and Relationship to Patient    ..................................................               ................................................  Date                                            Time    ..........................................................................................................................................  Reviewed by Signature/Title    ...................................................              ..............................................  Date                                                            Time

## 2018-01-21 ENCOUNTER — HEALTH MAINTENANCE LETTER (OUTPATIENT)
Age: 28
End: 2018-01-21

## 2018-01-24 ENCOUNTER — PRENATAL OFFICE VISIT (OUTPATIENT)
Dept: OBGYN | Facility: OTHER | Age: 28
End: 2018-01-24
Attending: OBSTETRICS & GYNECOLOGY
Payer: COMMERCIAL

## 2018-01-24 ENCOUNTER — HOSPITAL ENCOUNTER (OUTPATIENT)
Dept: ULTRASOUND IMAGING | Facility: HOSPITAL | Age: 28
Discharge: HOME | End: 2018-01-24
Attending: OBSTETRICS & GYNECOLOGY | Admitting: OBSTETRICS & GYNECOLOGY
Payer: COMMERCIAL

## 2018-01-24 VITALS — SYSTOLIC BLOOD PRESSURE: 118 MMHG | WEIGHT: 158 LBS | BODY MASS INDEX: 29.85 KG/M2 | DIASTOLIC BLOOD PRESSURE: 76 MMHG

## 2018-01-24 DIAGNOSIS — O99.810 GLUCOSE INTOLERANCE OF PREGNANCY: ICD-10-CM

## 2018-01-24 DIAGNOSIS — Z34.80 SUPERVISION OF OTHER NORMAL PREGNANCY, ANTEPARTUM: Primary | ICD-10-CM

## 2018-01-24 PROCEDURE — 2894A VOIDCORRECT: CPT | Performed by: OBSTETRICS & GYNECOLOGY

## 2018-01-24 PROCEDURE — 76816 OB US FOLLOW-UP PER FETUS: CPT | Mod: TC

## 2018-01-24 PROCEDURE — 59426 ANTEPARTUM CARE ONLY: CPT | Performed by: OBSTETRICS & GYNECOLOGY

## 2018-01-24 NOTE — MR AVS SNAPSHOT
"              After Visit Summary   1/24/2018    Arabella Desai    MRN: [id number]           Patient Information     Date Of Birth          1990        Visit Information        Provider Department      1/24/2018 9:10 AM Kayla Dean MD [facility]        Today's Diagnoses     Supervision of other normal pregnancy, antepartum    -  1      Care Instructions    Return to clinic in 3 weeks.  If you think your bag of water is broken; have bleeding like a period; think you are in labor; or are worried about your baby's movement, please call the labor and delivery unit at [phone].            Follow-ups after your visit        Your next 10 appointments already scheduled     Feb 14, 2018  9:40 AM CST   (Arrive by 9:25 AM)   Post Partum with Kayla Dean MD   [facility] [place] ([facility] [place] )    [address]  [place] [address] [phone]              Who to contact     If you have questions or need follow up information about today's clinic visit or your schedule please contact [facility] directly at [phone].  Normal or non-critical lab and imaging results will be communicated to you by MyChart, letter or phone within 4 business days after the clinic has received the results. If you do not hear from us within 7 days, please contact the clinic through MyChart or phone. If you have a critical or abnormal lab result, we will notify you by phone as soon as possible.  Submit refill requests through [employer] or call your pharmacy and they will forward the refill request to us. Please allow 3 business days for your refill to be completed.          Additional Information About Your Visit        MyChart Information     [employer] lets you send messages to your doctor, view your test results, renew your prescriptions, schedule appointments and more. To sign up, go to [email or web address] . Click on \"Log in\" on the left side of the screen, which will " "take you to the Welcome page. Then click on \"Sign up Now\" on the right side of the page.     You will be asked to enter the access code listed below, as well as some personal information. Please follow the directions to create your username and password.     Your access code is: [email or web address]  Expires: 4/3/2018  9:13 AM     Your access code will  in 90 days. If you need help or a new code, please call your [facility] clinic or [phone].        Care EveryWhere ID     This is your Care EveryWhere ID. This could be used by other organizations to access your [facility] medical records  [id number]        Your Vitals Were     Last Period BMI (Body Mass Index)                2017 (Approximate) 29.85 kg/m2           Blood Pressure from Last 3 Encounters:   18 118/76   18 123/65   18 100/68    Weight from Last 3 Encounters:   18 158 lb (71.7 kg)   18 158 lb 8 oz (71.9 kg)   18 158 lb (71.7 kg)              Today, you had the following     No orders found for display       Primary Care Provider Office Phone # Fax #    NITO Monroy [phone]       [facility] [address] [address]        Equal Access to Services     ERIK SOMMERS : Hadii aad ku hadasho Soomaali, waaxda luqadaha, qaybta kaalmada adeegyada, fe masters . So [facility] [phone].    ATENCIÓN: Si habla español, tiene a boston disposición servicios gratuitos de asistencia lingüística. Kady al [phone].    We comply with applicable federal civil rights laws and [place] laws. We do not discriminate on the basis of race, color, national origin, age, disability, sex, sexual orientation, or gender identity.            Thank you!     Thank you for choosing [facility]  for your care. Our goal is always to provide you with excellent care. Hearing back from our patients is one way we can continue to improve our services. Please " take a few minutes to complete the written survey that you may receive in the mail after your visit with us. Thank you!             Your Updated Medication List - Protect others around you: Learn how to safely use, store and throw away your medicines at www.disposemymeds.org.          This list is accurate as of 1/24/18 11:06 AM.  Always use your most recent med list.                   Brand Name Dispense Instructions for use Diagnosis    nicotine 14 MG/24HR 24 hr patch    NICODERM CQ    30 patch    Place 1 patch onto the skin every 24 hours    Smokes and motivated to quit       prenatal multivitamin plus iron 27-0.8 MG Tabs per tablet      Take 1 tablet by mouth daily

## 2018-01-24 NOTE — PATIENT INSTRUCTIONS
Return to clinic in 3 weeks.  If you think your bag of water is broken; have bleeding like a period; think you are in labor; or are worried about your baby's movement, please call the labor and delivery unit at [phone].

## 2018-01-26 ENCOUNTER — HOSPITAL ENCOUNTER (INPATIENT)
Facility: HOSPITAL | Age: 28
LOS: 1 days | Discharge: HOME | End: 2018-01-27
Attending: OBSTETRICS & GYNECOLOGY | Admitting: FAMILY MEDICINE
Payer: COMMERCIAL

## 2018-01-26 DIAGNOSIS — O92.29 SORE NIPPLES DUE TO LACTATION: ICD-10-CM

## 2018-01-26 LAB
ABO + RH BLD: NORMAL
ABO + RH BLD: NORMAL
ERYTHROCYTE [DISTWIDTH] IN BLOOD BY AUTOMATED COUNT: 12.8 % (ref 10–15)
HCT VFR BLD AUTO: 38.5 % (ref 35–47)
HGB BLD-MCNC: 14 G/DL (ref 11.7–15.7)
MCH RBC QN AUTO: 30.7 PG (ref 26.5–33)
MCHC RBC AUTO-ENTMCNC: 36.4 G/DL (ref 31.5–36.5)
MCV RBC AUTO: 84 FL (ref 78–100)
PLATELET # BLD AUTO: 212 10E9/L (ref 150–450)
RBC # BLD AUTO: 4.56 10E12/L (ref 3.8–5.2)
SPECIMEN EXP DATE BLD: NORMAL
WBC # BLD AUTO: 11.1 10E9/L (ref 4–11)

## 2018-01-26 PROCEDURE — 25000128 H RX IP 250 OP 636

## 2018-01-26 PROCEDURE — 86901 BLOOD TYPING SEROLOGIC RH(D): CPT | Performed by: OBSTETRICS & GYNECOLOGY

## 2018-01-26 PROCEDURE — 25000128 H RX IP 250 OP 636: Performed by: OBSTETRICS & GYNECOLOGY

## 2018-01-26 PROCEDURE — 86900 BLOOD TYPING SEROLOGIC ABO: CPT | Performed by: OBSTETRICS & GYNECOLOGY

## 2018-01-26 PROCEDURE — 86780 TREPONEMA PALLIDUM: CPT | Performed by: OBSTETRICS & GYNECOLOGY

## 2018-01-26 PROCEDURE — 10907ZC DRAINAGE OF AMNIOTIC FLUID, THERAPEUTIC FROM PRODUCTS OF CONCEPTION, VIA NATURAL OR ARTIFICIAL OPENING: ICD-10-PCS | Performed by: FAMILY MEDICINE

## 2018-01-26 PROCEDURE — 36415 COLL VENOUS BLD VENIPUNCTURE: CPT | Performed by: OBSTETRICS & GYNECOLOGY

## 2018-01-26 PROCEDURE — 85027 COMPLETE CBC AUTOMATED: CPT | Performed by: OBSTETRICS & GYNECOLOGY

## 2018-01-26 PROCEDURE — 25000132 ZZH RX MED GY IP 250 OP 250 PS 637: Performed by: OBSTETRICS & GYNECOLOGY

## 2018-01-26 PROCEDURE — 12000027 ZZH R&B OB

## 2018-01-26 PROCEDURE — 25000125 ZZHC RX 250: Performed by: OBSTETRICS & GYNECOLOGY

## 2018-01-26 RX ORDER — NALOXONE HYDROCHLORIDE 0.4 MG/ML
.1-.4 INJECTION, SOLUTION INTRAMUSCULAR; INTRAVENOUS; SUBCUTANEOUS
Status: DISCONTINUED | OUTPATIENT
Start: 2018-01-26 | End: 2018-01-28 | Stop reason: HOSPADM

## 2018-01-26 RX ORDER — METHYLERGONOVINE MALEATE 0.2 MG/ML
200 INJECTION INTRAVENOUS
Status: DISCONTINUED | OUTPATIENT
Start: 2018-01-26 | End: 2018-01-28 | Stop reason: HOSPADM

## 2018-01-26 RX ORDER — ACETAMINOPHEN 325 MG/1
650 TABLET ORAL EVERY 4 HOURS PRN
Status: DISCONTINUED | OUTPATIENT
Start: 2018-01-26 | End: 2018-01-28 | Stop reason: HOSPADM

## 2018-01-26 RX ORDER — ONDANSETRON 2 MG/ML
4 INJECTION INTRAMUSCULAR; INTRAVENOUS EVERY 6 HOURS PRN
Status: DISCONTINUED | OUTPATIENT
Start: 2018-01-26 | End: 2018-01-28 | Stop reason: HOSPADM

## 2018-01-26 RX ORDER — OXYTOCIN 10 [USP'U]/ML
10 INJECTION, SOLUTION INTRAMUSCULAR; INTRAVENOUS
Status: DISCONTINUED | OUTPATIENT
Start: 2018-01-26 | End: 2018-01-28 | Stop reason: HOSPADM

## 2018-01-26 RX ORDER — OXYCODONE AND ACETAMINOPHEN 5; 325 MG/1; MG/1
1 TABLET ORAL
Status: DISCONTINUED | OUTPATIENT
Start: 2018-01-26 | End: 2018-01-28 | Stop reason: HOSPADM

## 2018-01-26 RX ORDER — NALOXONE HYDROCHLORIDE 0.4 MG/ML
.1-.4 INJECTION, SOLUTION INTRAMUSCULAR; INTRAVENOUS; SUBCUTANEOUS
Status: DISCONTINUED | OUTPATIENT
Start: 2018-01-26 | End: 2018-01-26

## 2018-01-26 RX ORDER — CARBOPROST TROMETHAMINE 250 UG/ML
250 INJECTION, SOLUTION INTRAMUSCULAR
Status: DISCONTINUED | OUTPATIENT
Start: 2018-01-26 | End: 2018-01-28 | Stop reason: HOSPADM

## 2018-01-26 RX ORDER — FENTANYL CITRATE 50 UG/ML
INJECTION, SOLUTION INTRAMUSCULAR; INTRAVENOUS
Status: COMPLETED
Start: 2018-01-26 | End: 2018-01-26

## 2018-01-26 RX ORDER — IBUPROFEN 800 MG/1
800 TABLET, FILM COATED ORAL EVERY 6 HOURS PRN
Status: DISCONTINUED | OUTPATIENT
Start: 2018-01-27 | End: 2018-01-28 | Stop reason: HOSPADM

## 2018-01-26 RX ORDER — OXYTOCIN/0.9 % SODIUM CHLORIDE 30/500 ML
100-340 PLASTIC BAG, INJECTION (ML) INTRAVENOUS CONTINUOUS PRN
Status: COMPLETED | OUTPATIENT
Start: 2018-01-26 | End: 2018-01-26

## 2018-01-26 RX ORDER — IBUPROFEN 800 MG/1
800 TABLET, FILM COATED ORAL
Status: COMPLETED | OUTPATIENT
Start: 2018-01-26 | End: 2018-01-26

## 2018-01-26 RX ORDER — LANOLIN 100 %
OINTMENT (GRAM) TOPICAL
Status: DISCONTINUED | OUTPATIENT
Start: 2018-01-26 | End: 2018-01-28 | Stop reason: HOSPADM

## 2018-01-26 RX ORDER — MISOPROSTOL 200 UG/1
800 TABLET ORAL
Status: DISCONTINUED | OUTPATIENT
Start: 2018-01-26 | End: 2018-01-28 | Stop reason: HOSPADM

## 2018-01-26 RX ORDER — SODIUM CHLORIDE, SODIUM LACTATE, POTASSIUM CHLORIDE, CALCIUM CHLORIDE 600; 310; 30; 20 MG/100ML; MG/100ML; MG/100ML; MG/100ML
INJECTION, SOLUTION INTRAVENOUS CONTINUOUS
Status: DISCONTINUED | OUTPATIENT
Start: 2018-01-26 | End: 2018-01-28 | Stop reason: HOSPADM

## 2018-01-26 RX ORDER — OXYTOCIN/0.9 % SODIUM CHLORIDE 30/500 ML
340 PLASTIC BAG, INJECTION (ML) INTRAVENOUS CONTINUOUS PRN
Status: DISCONTINUED | OUTPATIENT
Start: 2018-01-26 | End: 2018-01-28 | Stop reason: HOSPADM

## 2018-01-26 RX ADMIN — FENTANYL CITRATE 100 MCG: 50 INJECTION, SOLUTION INTRAMUSCULAR; INTRAVENOUS at 19:17

## 2018-01-26 RX ADMIN — SODIUM CHLORIDE, POTASSIUM CHLORIDE, SODIUM LACTATE AND CALCIUM CHLORIDE: 600; 310; 30; 20 INJECTION, SOLUTION INTRAVENOUS at 19:24

## 2018-01-26 RX ADMIN — IBUPROFEN 800 MG: 800 TABLET ORAL at 20:05

## 2018-01-26 RX ADMIN — OXYTOCIN-SODIUM CHLORIDE 0.9% IV SOLN 30 UNIT/500ML 340 ML/HR: 30-0.9/5 SOLUTION at 19:50

## 2018-01-26 NOTE — IP AVS SNAPSHOT
MRN:[id number]                      After Visit Summary   1/26/2018    Arabella Desai    MRN: [id number]           Thank you!     Thank you for choosing [facility] for your care. Our goal is always to provide you with excellent care. Hearing back from our patients is one way we can continue to improve our services. Please take a few minutes to complete the written survey that you may receive in the mail after you visit with us. Thank you!        Patient Information     Date Of Birth          1990        About your hospital stay     You were admitted on:  January 26, 2018 You last received care in the:  [facility]    You were discharged on:  January 27, 2018        Reason for your hospital stay       For normal vaginal delivery of live male infant on 1.26.1018 at 7.46 pm   5 lb 11.7 oz                  Who to Call     For medical emergencies, please call 911.  For non-urgent questions about your medical care, please call your primary care provider or clinic, [phone]          Attending Provider     Provider Specialty    Eloy Mccarthy MD OB/Gyn       Primary Care Provider Office Phone # Fax #    NITO Monroy [phone]       When to contact your care team       Call your primary doctor if you have any of the following: temperature greater than 101F    Increasing pain and foul vaginal discharge.  Excessive vaginal bleeding.                  After Care Instructions     Activity       Your activity upon discharge: activity as tolerated  Avoid sex 6 weeks.            Diet       Follow this diet upon discharge: Regular                  Follow-up Appointments     Follow-up and recommended labs and tests        See Dr. Dean in 2 weeks.  Avoid sex 6 weeks.                  Your next 10 appointments already scheduled     Feb 14, 2018  9:40 AM CST   (Arrive by 9:25 AM)   Post Partum with Kayla Dean MD   [facility] ([facility] )    [address] [place] [address]   [phone]              Further instructions from your care team       Vaginal Delivery   Discharge Instructions    Activity: Go back to your normal activities    Diet: You may eat a regular diet. Drink plenty of fluids.      Call your Doctor  if you have any of these symptoms:    * You soak a sanitary pad with blood within 1 hour, or you see clots larger than a  golf ball.    * Bleeding that lasts more than 6 weeks.     *Bad-smelling fluid that comes out of your vagina.    *A fever above 100.4 degrees Fahrenheit (38.4 degrees Celsius) with or without chills.    * Severe pain, cramping, or tenderness in your lower belly area.    * Increased pain, swelling, redness or fluid around your stitches.    * A need to urinate more frequently (use the toilet more often), more urgently (use the toilet very quickly), or it burns when you urinate.    * Redness, swelling, or pain around a vein in your leg.    * Problems coping with sadness, anxiety, or depression.    * Problems breastfeeding, or a red or painful area on your breast.    * You have questions or concerns after you return home.      [facility]: [phone]    Pending Results     Date and Time Order Name Status Description    1/26/2018 1905 Anti Treponema In process             Statement of Approval     Ordered          01/27/18 1939  I have reviewed and agree with all the recommendations and orders detailed in this document.  EFFECTIVE NOW     Approved and electronically signed by:  Eloy Mccarthy MD             Admission Information     Date & Time Provider Department Dept. Phone    1/26/2018 Eloy Mccarthy MD [facility] [phone]      Your Vitals Were     Blood Pressure Pulse Temperature Respirations Last Period Pulse Oximetry    118/74 66 98.1  F (36.7  C) (Oral) 18 05/01/2017 (Approximate) 99%      MyChart Information     [employer] lets you send messages to your doctor, view  "your test results, renew your prescriptions, schedule appointments and more. To sign up, go to [email or web address] . Click on \"Log in\" on the left side of the screen, which will take you to the Welcome page. Then click on \"Sign up Now\" on the right side of the page.     You will be asked to enter the access code listed below, as well as some personal information. Please follow the directions to create your username and password.     Your access code is: [email or web address]  Expires: 4/3/2018  9:13 AM     Your access code will  in 90 days. If you need help or a new code, please call your [facility] clinic or [phone].        Care EveryWhere ID     This is your Care EveryWhere ID. This could be used by other organizations to access your [facility] medical records  [id number]        Equal Access to Services     [facility] : Humaira Manuel, kathy pritchett, naz palacios, fe masters . So [facility] [phone].    ATENCIÓN: Si habla español, tiene a boston disposición servicios gratuitos de asistencia lingüística. Llterrell al [phone].    We comply with applicable federal civil rights laws and [place] laws. We do not discriminate on the basis of race, color, national origin, age, disability, sex, sexual orientation, or gender identity.               Review of your medicines      START taking        Dose / Directions    docusate sodium 100 MG capsule   Commonly known as:  COLACE        Dose:  100 mg   Take 1 capsule (100 mg) by mouth 2 times daily   Quantity:  1 capsule   Refills:  1       ibuprofen 800 MG tablet   Commonly known as:  ADVIL/MOTRIN        Dose:  800 mg   Take 1 tablet (800 mg) by mouth every 6 hours as needed for other (cramping)   Quantity:  90 tablet   Refills:  1       lanolin ointment   Used for:  Sore nipples due to lactation        Apply topically every hour as needed for dry skin (sore nipples)   Quantity:  40 g   Refills:  3       " oxyCODONE-acetaminophen 5-325 MG per tablet   Commonly known as:  PERCOCET        Dose:  1 tablet   Take 1 tablet by mouth once as needed for moderate to severe pain   Quantity:  20 tablet   Refills:  0         CONTINUE these medicines which have NOT CHANGED        Dose / Directions    nicotine 14 MG/24HR 24 hr patch   Commonly known as:  NICODERM CQ   Used for:  Smokes and motivated to quit        Dose:  1 patch   Place 1 patch onto the skin every 24 hours   Quantity:  30 patch   Refills:  1       prenatal multivitamin plus iron 27-0.8 MG Tabs per tablet        Dose:  1 tablet   Take 1 tablet by mouth daily   Refills:  0            Where to get your medicines      Some of these will need a paper prescription and others can be bought over the counter. Ask your nurse if you have questions.     Bring a paper prescription for each of these medications     docusate sodium 100 MG capsule    ibuprofen 800 MG tablet    lanolin ointment    oxyCODONE-acetaminophen 5-325 MG per tablet                Protect others around you: Learn how to safely use, store and throw away your medicines at www.disposemymeds.org.        Information about OPIOIDS     PRESCRIPTION OPIOIDS: WHAT YOU NEED TO KNOW    Prescription opioids can be used to help relieve moderate to severe pain and are often prescribed following a surgery or injury, or for certain health conditions. These medications can be an important part of treatment but also come with serious risks. It is important to work with your health care provider to make sure you are getting the safest, most effective care.    WHAT ARE THE RISKS AND SIDE EFFECTS OF OPIOID USE?  Prescription opioids carry serious risks of addiction and overdose, especially with prolonged use. An opioid overdose, often marked by slowed breathing can cause sudden death. The use of prescription opioids can have a number of side effects as well, even when taken as directed:      Tolerance - meaning you might need  to take more of a medication for the same pain relief    Physical dependence - meaning you have symptoms of withdrawal when a medication is stopped    Increased sensitivity to pain    Constipation    Nausea, vomiting, and dry mouth    Sleepiness and dizziness    Confusion    Depression    Low levels of testosterone that can result in lower sex drive, energy, and strength    Itching and sweating    RISKS ARE GREATER WITH:    History of drug misuse, substance use disorder, or overdose    Mental health conditions (such as depression or anxiety)    Sleep apnea    Older age (65 years or older)    Pregnancy    Avoid alcohol while taking prescription opioids.   Also, unless specifically advised by your health care provider, medications to avoid include:    Benzodiazepines (such as Xanax or Valium)    Muscle relaxants (such as Soma or Flexeril)    Hypnotics (such as Ambien or Lunesta)    Other prescription opioids    KNOW YOUR OPTIONS:  Talk to your health care provider about ways to manage your pain that do not involve prescription opioids. Some of these options may actually work better and have fewer risks and side effects:    Pain relievers such as acetaminophen, ibuprofen, and naproxen    Some medications that are also used for depression or seizures    Physical therapy and exercise    Cognitive behavioral therapy, a psychological, goal-directed approach, in which patients learn how to modify physical, behavioral, and emotional triggers of pain and stress    IF YOU ARE PRESCRIBED OPIOIDS FOR PAIN:    Never take opioids in greater amounts or more often than prescribed    Follow up with your primary health care provider and work together to create a plan on how to manage your pain.    Talk about ways to help manage your pain that do not involve prescription opioids    Talk about all concerns and side effects    Help prevent misuse and abuse    Never sell or share prescription opioids    Never use another person's  prescription opioids    Store prescription opioids in a secure place and out of reach of others (this may include visitors, children, friends, and family)    Visit www.cdc.gov/drugoverdose to learn about risks of opioid abuse and overdose    If you believe you may be struggling with addiction, tell your health care provider and ask for guidance or call [facility]'s National Helpline at 1-717-192-HELP    LEARN MORE / www.cdc.gov/drugoverdose/prescribing/guideline.html    Safely dispose of unused prescription opioids: Find your local drug take-back programs and more information about the importance of safe disposal at www.doseofreality.[place].gov             Medication List: This is a list of all your medications and when to take them. Check marks below indicate your daily home schedule. Keep this list as a reference.      Medications           Morning Afternoon Evening Bedtime As Needed    docusate sodium 100 MG capsule   Commonly known as:  COLACE   Take 1 capsule (100 mg) by mouth 2 times daily   Last time this was given:  100 mg on 1/27/2018  7:39 PM                                ibuprofen 800 MG tablet   Commonly known as:  ADVIL/MOTRIN   Take 1 tablet (800 mg) by mouth every 6 hours as needed for other (cramping)   Last time this was given:  800 mg on 1/27/2018  3:43 PM                                lanolin ointment   Apply topically every hour as needed for dry skin (sore nipples)                                nicotine 14 MG/24HR 24 hr patch   Commonly known as:  NICODERM CQ   Place 1 patch onto the skin every 24 hours                                oxyCODONE-acetaminophen 5-325 MG per tablet   Commonly known as:  PERCOCET   Take 1 tablet by mouth once as needed for moderate to severe pain                                prenatal multivitamin plus iron 27-0.8 MG Tabs per tablet   Take 1 tablet by mouth daily                                          More Information        After a Vaginal Birth  After having a baby,  your body may be very tired. It can take time to recover from a vaginal delivery. You may stay in the hospital or birth center from 1 to 4 days. In some cases, you may be able to go home the same day.    Right after the delivery  Your temperature and blood pressure will be taken until they are stable. A nurse or other healthcare provider will observe you as you rest. You may have afterbirth pains. These are cramps caused by the uterus shrinking. Sanitary pads are used to soak up the discharge of the uterine lining. To make sure that you aren t bleeding too much, the pad will be checked. And the firmness of your uterus will be checked. To do this, a nurse will gently push down on your stomach. If you had anesthesia, you ll be watched closely until you can feel and move your toes. If you have perineal pain (pain between the vagina and anus), an ice pack can help.  [place] care  While still in the hospital or birth center, you ll learn how to hold and feed your baby. You will also be given instructions on how to care for your baby. This includes bathing and feeding.   Preparing to go home  You may be anxious to go home as soon as possible. Before you and your baby go home, a healthcare provider will check to be sure you are healthy enough to take care of your baby and yourself. You re ready to go home when:    You can walk to the bathroom and use the bathroom without help.    You can eat solid food and swallow pills (if needed).    You have no sign of infection or other health problems, including fever.     You have adequate pain control.    Your bleeding isn't excessive.    You are able to care for your  and are emotionally stable.  Before leaving the hospital or birth center, you ll be given written instructions for home self-care after vaginal delivery. Be sure to follow these instructions carefully. If you have questions or concerns, talk about them now.  If you have stitches  You may have received stitches  in the skin near your vagina. The stitches might have closed an episiotomy (an incision that enlarges the opening of the vagina). Or you may have needed stitches to repair torn skin. Either way, your stitches should dissolve within weeks. Until then, you can help reduce discomfort, aid healing, and reduce your risk of infection by keeping the stitches clean. These tips can help:    Gently wipe from front to back after you urinate or have a bowel movement.    After wiping, spray warm water on the area. Or you can have a sitz bath. This means sitting in a tub with a few inches of water in it. Then pat the area dry or use a hairdryer on a cool setting.    Do not use soap or any solution except water on the area.    You can take a shower unless told not to.    Change sanitary pads at least every 2 to 4 hours.    Place cold or heat packs on the area as directed by your healthcare providers or nurses. Keep a thin towel between the pack and your skin.    Sit on firm seats so the stitches pull less.   follow-up  Schedule a  follow-up exam with your healthcare provider for about 6 weeks after delivery. During this exam, your uterus and vaginal area will be checked. Contact your healthcare provider if you think you or your baby are having any problems.  When to call your healthcare provider  Call your health care provider right away if you have:    A fever of 100.4 F (38.0 C) or higher    Bleeding that requires a new sanitary pad after an hour, or large blood clots    Pain in your vagina that gets worse and isn't relieved with medicine    Swelling, discharge, or increased pain from vaginal tear or episiotomy    Burning, pain, red streaks, or lumpy areas in your breasts that may be accompanied by flu-like symptoms    Cracks, blisters, or blood on your nipples    Burning or pain when you urinate    Nausea or vomiting    Dizziness or fainting    Feelings of extreme sadness or anxiety, or a feeling that you  don t want to be with your baby    Abdominal pain that isn t relieved with medicine    Vaginal discharge that has a bad odor    No bowel movement for 5 days    Painful urination, orinability to control urination    Redness, warmth, or pain in the lower leg    Chest pain   Date Last Reviewed: 8/2/2015 2000-2017 [employer]. [address] [place] [address]. All rights reserved. This information is not intended as a substitute for professional medical care. Always follow your healthcare professional's instructions.                Breastfeeding: Caring for Yourself  When you have a new little person in your life, it s easy to forget about yourself. There are new demands on your time. There are also new responsibilities. But it s important to take care of yourself. This will help you take better care of your new baby.     Healthy habits  Here are some healthy tips:    Get exercise when you can. If you leak milk, it will help to nurse right before the activity.    Avoid smoking. Smoking is unhealthy for you and may cause you to make less milk. Secondhand smoke is also harmful to your baby.    Talk to your healthcare provider about alcohol, if you choose to drink.    When you re sick, tell your healthcare provider that you are breastfeeding. Few medicines and illnesses affect breastfeeding, but it is important to check.    Ask your healthcare provider before taking any prescription or over-the-counter medicines, herbs, or supplements.  Comfy clothes  Suggestions for being comfortable when breastfeeding include:    Find a comfortable nursing bra. Many women find underwire uncomfortable. Some stores offer on-site fittings. Ask your healthcare provider or nurse for a referral.    If you have leaking milk, place breast pads inside your bra.    Choose an extra-supportive bra for exercise. Or you can wear two bras at the same time for more support.    Wear loose tops that can be lifted for  breastfeeding. You can also buy clothes specially made for breastfeeding moms.  A note about sex  After delivery, it may take a while before your interest in sex returns. Share your feelings with your partner. Your healthcare provider will let you know when it is safe to resume having sex. When you re ready, know that:    There are several forms of birth control that can be used while breastfeeding. Ask your healthcare provider what to use for pregnancy prevention while you are nursing.    Breastfeeding hormones may cause vaginal dryness. Some women find using a water-based lubricant makes sex more comfortable.    Milk may let down when you are aroused. Applying pressure on the nipple, using breast pads, or a towel may help with this.  When to call your healthcare provider  Call your healthcare provider if:    You feel overwhelmed and don't know where to turn.    You feel very sad or don t want to be with your baby.    You feel like your baby cries all the time and won't be soothed    You are unable to exercise, or have sex, without discomfort.    You are unsure about a medicine, illness, or activity and its effect on breastfeeding.   Date Last Reviewed: 9/7/2015 2000-2017 [employer]. [address] [place] [address], [place] [address]. All rights reserved. This information is not intended as a substitute for professional medical care. Always follow your healthcare professional's instructions.

## 2018-01-26 NOTE — IP AVS SNAPSHOT
[facility]    [address] [address]    Phone:  [phone]    Fax:  [phone]                                       After Visit Summary   1/26/2018    Arabella Desai    MRN: [id number]           After Visit Summary Signature Page     I have received my discharge instructions, and my questions have been answered. I have discussed any challenges I see with this plan with the nurse or doctor.    ..........................................................................................................................................  Patient/Patient Representative Signature      ..........................................................................................................................................  Patient Representative Print Name and Relationship to Patient    ..................................................               ................................................  Date                                            Time    ..........................................................................................................................................  Reviewed by Signature/Title    ...................................................              ..............................................  Date                                                            Time

## 2018-01-27 VITALS
RESPIRATION RATE: 18 BRPM | SYSTOLIC BLOOD PRESSURE: 118 MMHG | OXYGEN SATURATION: 99 % | TEMPERATURE: 98.1 F | HEART RATE: 66 BPM | DIASTOLIC BLOOD PRESSURE: 74 MMHG

## 2018-01-27 PROCEDURE — 72200001 ZZH LABOR CARE VAGINAL DELIVERY SINGLE

## 2018-01-27 PROCEDURE — 25000132 ZZH RX MED GY IP 250 OP 250 PS 637: Performed by: FAMILY MEDICINE

## 2018-01-27 PROCEDURE — 25000132 ZZH RX MED GY IP 250 OP 250 PS 637: Performed by: OBSTETRICS & GYNECOLOGY

## 2018-01-27 RX ORDER — DOCUSATE SODIUM 100 MG/1
100 CAPSULE, LIQUID FILLED ORAL ONCE
Status: COMPLETED | OUTPATIENT
Start: 2018-01-27 | End: 2018-01-27

## 2018-01-27 RX ORDER — OXYCODONE AND ACETAMINOPHEN 5; 325 MG/1; MG/1
1 TABLET ORAL
Qty: 20 TABLET | Refills: 0 | Status: SHIPPED | OUTPATIENT
Start: 2018-01-27 | End: 2018-02-14

## 2018-01-27 RX ORDER — LANOLIN 100 %
OINTMENT (GRAM) TOPICAL
Qty: 40 G | Refills: 3 | Status: SHIPPED | OUTPATIENT
Start: 2018-01-27 | End: 2018-08-23

## 2018-01-27 RX ORDER — IBUPROFEN 800 MG/1
800 TABLET, FILM COATED ORAL EVERY 6 HOURS PRN
Qty: 90 TABLET | Refills: 1 | Status: SHIPPED | OUTPATIENT
Start: 2018-01-27 | End: 2018-02-14

## 2018-01-27 RX ORDER — DOCUSATE SODIUM 100 MG/1
100 CAPSULE, LIQUID FILLED ORAL 2 TIMES DAILY
Qty: 1 CAPSULE | Refills: 1 | Status: SHIPPED | OUTPATIENT
Start: 2018-01-27 | End: 2018-02-14

## 2018-01-27 RX ADMIN — IBUPROFEN 800 MG: 800 TABLET ORAL at 09:42

## 2018-01-27 RX ADMIN — DOCUSATE SODIUM 100 MG: 100 CAPSULE, LIQUID FILLED ORAL at 19:39

## 2018-01-27 RX ADMIN — IBUPROFEN 800 MG: 800 TABLET ORAL at 15:43

## 2018-01-27 NOTE — PLAN OF CARE
Pt arrived stating she has been having contractions since this morning. Contractions have increased throughout the day in intensity and duration. She states contractions are 3-4 minutes apart and 30-45 seconds long. Pt denies rupture of membranes at this time. Will monitor.

## 2018-01-27 NOTE — PLAN OF CARE
Face to face report given with opportunity to observe patient.    Report given to Jennifer Little   1/27/2018  7:17 AM

## 2018-01-27 NOTE — PLAN OF CARE
Assessments completed as charted. B/P: 119/71, T: 97.7, P: 88, R: 16. Rates pain: 1/10 soreness in back. Ibuprofen given with some relief. Voiding without difficulty. Fundus: Midline U/2. Lochia: Light. Activity: unrestricted with out pain  and normal activity. Infant feeding: Breast feeding going well.     LATCH Score:   Latch: 2 - Good Latch  Audible Swallowin - Spontaneous & frequent  Type of Nipple: (Breast/Nipple) 2 - Everted  Comfort: 2 - Soft, Nontender  Hold: 2 - No Assist   Total LATCH Score: 10    Postpartum breastfeeding assessment completed and education provided, see Patient Education Activity.  Items included in the education are:     proper positioning and latch    effectiveness of feeding    manual expression    handling and storing breastmilk    maintenance of breastfeeding for the first 6 months    sign/symptoms of infant feeding issues requiring referral to qualified health care provider  Postpartum care education provided, see Patient Education activity. Patient denies needs. Will monitor.  Tracey Barcenas

## 2018-01-27 NOTE — PROGRESS NOTES
[facility] Post-Partum Progress Note          Assessment and Plan:    Assessment:   Post-partum day #1  Normal spontaneous vaginal delivery  L&D complications: None      Doing well.      Plan:   Ambulation encouraged           Interval History:   Doing well.  Pain is well-controlled.  No fevers.  No history of foul-smelling vaginal discharge.  Good appetite.  Denies chest pain, shortness of breath, nausea or vomiting.  Vaginal bleeding is similar to a heavy menstrual flow.  Ambulatory.  Breastfeeding well.          Significant Problems:    None          Review of Systems:    The patient denies any chest pain, shortness of breath, excessive pain, fever, chills, purulent drainage from the wound, nausea or vomiting.          Medications:   All medications related to the patient's surgery have been reviewed          Physical Exam:   All vitals stable  Temp: 98.3  F (36.8  C) Temp src: Oral BP: (!) 99/47 (pt sleeping prior to assessment) Pulse: 113 Heart Rate: 84 Resp: 14 SpO2: 98 %      Uterine fundus is firm, non-tender and at the level of the umbilicus          Data:     All laboratory data related to this surgery reviewed  Hemoglobin   Date Value Ref Range Status   01/26/2018 14.0 11.7 - 15.7 g/dL Final   01/03/2018 14.0 11.7 - 15.7 g/dL Final   11/08/2017 13.0 11.7 - 15.7 g/dL Final   06/20/2017 15.1 11.7 - 15.7 g/dL Final   12/05/2016 15.8 (H) 11.7 - 15.7 g/dL Final     No imaging studies have been ordered    Eloy Mccarthy MD

## 2018-01-27 NOTE — L&D DELIVERY NOTE
I was called urgently by the nurses for a  who presented at 7 cm in active labor. Dr. Mccarthy (covering for Dr. Dean) and anesthesia tied up in surgery. Patient was very upset as she was in pain and had expected an intrathecal. By the time I arrived she was 9+ cm and at +2 station with a bulging BOW. She was given fentanyl and nitrous oxide to get her through. She relaxed significantly with this. She progressed rapidly to complete dilation. She delivered a 5# 11.7 oz male infant over an intact perineum. No lacerations were noted. Placenta delivered uneventfully and she was bonding in the birthing room postpartum. Dr. Mccarthy now here and assuming cares.  Delivery Summary        Labor Event Times    Labor onset date:  18 Onset time:   7:00 AM   Dilation complete date:  18 Complete time:   7:44 PM   Start pushing date/time:  2018   Delivery 194            Labor Events     labor?:  No   Labor Type:  Spontaneous   Predominate monitoring during 1st stage:  continuous electronic fetal monitoring, intermittent auscultation      Antibiotics received during labor?:  No      Rupture date/time:     Rupture type:  Artificial Rupture of Membranes   Fluid color:  Clear   Fluid odor:  Normal      1:1 continuous labor support provided by?:  RN          Delivery/Placenta Date and Time    Delivery Date:  18 Delivery Time:   7:46 PM   Placenta Date/Time:  2018  7:51 PM   Oxytocin given at the time of delivery:  after delivery of baby      Vaginal Counts    Initial count performed by 2 team members:   Two Team Members   Iris Jones RN          [employer]   Initial counts 1  15 10   Added to count       Final counts 1  15 10      Placed during labor Accounted for at the end of labor      Final count performed by 2 team members:   Two Team Members   Iris Jones RN         Final count correct?:  Yes         Apgars    Living status:  Living    1 Minute 5  "Minute 10 Minute 15 Minute 20 Minute   Skin color: 1  1       Heart rate: 2  2       Reflex irritability: 2  2       Muscle tone: 2  2       Respiratory effort: 2  2       Total: 9  9          Apgars assigned by:  LUPILLO NEWTON RN      Cord    Vessels:  3 Vessels Complications:  None   Cord Blood Disposition:  Lab Gases Sent?:  No          Resuscitation    Methods:  None      Sanders Care at Delivery:  Viable infant born via , spontaneous cry, placed skin to skin with mother, dried and stimulated.       Sanders Measurements    Weight:  5 lb 11.7 oz Length:  1' 6\"   Head circumference:  33 cm Chest circumference:  34.3 cm   Abdominal girth:  25.4 cm      Skin to Skin and Feeding Plan    Skin to skin initiation date/time: 18   Skin to skin with:  Mother   Skin to skin end date/time: 18      How do you plan to feed your baby:  Breastfeeding      Labor Events and Shoulder Dystocia    Fetal Tracing Prior to Delivery:  Category 1   Shoulder dystocia present?:  Neg            Delivery (Maternal) (Provider to Complete) (043950)    Episiotomy:  None   Perineal lacerations:  None    Est. blood loss (mL):  300         Mother's Information  Mother: Arabella Desai #[id number]    Start of Mother's Information     IO Blood Loss  18 0700 - 18 0832    Mom's I/O Activity            End of Mother's Information  Mother: Arabella Desai #[id number]            Delivery - Provider to Complete (733033)    Delivering clinician:  POWER CALHOUN   Attempted Delivery Types (Choose all that apply):  Spontaneous Vaginal Delivery   Delivery Type (Choose the 1 that will go to the Birth History):  Vaginal, Spontaneous Delivery                           Placenta    Delayed Cord Clamping:  Done   Date/Time:  2018  7:51 PM   Removal:  Spontaneous   Disposition:  Hospital disposal      Anesthesia    Method:  INTRAVENOUS REGIONAL, Nitrous Oxide         Presentation and Position    Presentation:  Vertex "   Position:  Left Occiput Anterior                    Thaddeus Hernandez MD

## 2018-01-27 NOTE — PLAN OF CARE
Problem: Patient Care Overview  Goal: Plan of Care/Patient Progress Review  Outcome: Improving  Assessments completed as charted. B/P: 107/57, T: 98.5, P: 113, R: 16. Rates pain: 0/10. Voiding without difficulty. Fundus: Midline, firm. Lochia: Light. Activity: unrestricted with out pain, normal activity . Infant feeding: Breast feeding going well.     LATCH Score:   Latch: 1 - Repeated Attempts  Audible Swallowin - Spontaneous & frequent  Type of Nipple: (Breast/Nipple) 2 - Everted  Comfort: 2 - Soft, Nontender  Hold: 1 - Min. Assist   Total LATCH Score: 8  Postpartum breastfeeding assessment completed and education provided, see Patient Education Activity.  Items included in the education are:     proper positioning and latch    effectiveness of feeding    manual expression    handling and storing breastmilk    maintenance of breastfeeding for the first 6 months    sign/symptoms of infant feeding issues requiring referral to qualified health care provider  Postpartum care education provided, see Patient Education activity. Patient denies needs. Will monitor.  Iris Little        Problem: Postpartum (Vaginal Delivery) (Adult,Obstetrics,Pediatric)  Goal: Signs and Symptoms of Listed Potential Problems Will be Absent, Minimized or Managed (Postpartum)  Signs and symptoms of listed potential problems will be absent, minimized or managed by discharge/transition of care (reference Postpartum (Vaginal Delivery) (Adult,Obstetrics,Pediatric) CPG).   Outcome: Improving   18 0115   Postpartum (Vaginal Delivery)   Problems Assessed (Postpartum Vaginal Delivery) all   Problems Present (Postpartum Vag Deliv) none

## 2018-01-27 NOTE — H&P
No significant change in general health status based on examination of the patient, review of Nursing Admission Database and prenatal record. Had been finn at home much of day. Got stronger this evening. , Rh+, GBS-. 38 5/6 weeks. Had hoped for intrathecal in labor. Dr. Mccarthy and anesthetist tied up in surgery. She is 7 cm, very uncomfortable. I was called in to cover. Nurses setting up for delivery.    EFW: 6lb

## 2018-01-27 NOTE — PLAN OF CARE
Vaginal Delivery Note   of viable Male.  Nursery RN Karen NGUYEN present.  Infant with spontaneous cry, to mother's abdomen, dried and stimulated. Araceli cares provided.  Mother and baby in stable condition. Baby skin-to-skin with mom. ID bands placed on infant, mom and father, Zak.

## 2018-01-28 NOTE — DISCHARGE SUMMARY
Physician Discharge Summary     Patient ID:  Arabella Desai  [id number]  27 year old  1990    Admit date: 2018    Discharge date and time: 2018     Admitting Physician: Eloy Mccarthy MD     Discharge Physician: Eloy Mccarthy MD    Admission Diagnoses: Normal labor and delivery    Discharge Diagnoses: Same as above    Admission Condition: good    Discharged Condition: good    Indication for Admission: In labor    Hospital Course:  post partum uneventful    Consults: None    Significant Diagnostic Studies:     Treatments: none    Discharge Exam:  NORMAL female exam post partum  Patient is in good condition and cheerful    Disposition: home    Patient Instructions:   Current Discharge Medication List      START taking these medications    Details   oxyCODONE-acetaminophen (PERCOCET) 5-325 MG per tablet Take 1 tablet by mouth once as needed for moderate to severe pain  Qty: 20 tablet, Refills: 0    Associated Diagnoses: Normal labor and delivery      ibuprofen (ADVIL/MOTRIN) 800 MG tablet Take 1 tablet (800 mg) by mouth every 6 hours as needed for other (cramping)  Qty: 90 tablet, Refills: 1    Associated Diagnoses: Normal labor and delivery      lanolin ointment Apply topically every hour as needed for dry skin (sore nipples)  Qty: 40 g, Refills: 3    Associated Diagnoses: Sore nipples due to lactation      docusate sodium (COLACE) 100 MG capsule Take 1 capsule (100 mg) by mouth 2 times daily  Qty: 1 capsule, Refills: 1    Associated Diagnoses: Normal labor and delivery         CONTINUE these medications which have NOT CHANGED    Details   nicotine (NICODERM CQ) 14 MG/24HR 24 hr patch Place 1 patch onto the skin every 24 hours  Qty: 30 patch, Refills: 1    Associated Diagnoses: Smokes and motivated to quit      Prenatal Vit-Fe Fumarate-FA (PRENATAL MULTIVITAMIN  PLUS IRON) 27-0.8 MG TABS per tablet Take 1 tablet by mouth daily           Activity: activity as tolerated  Diet: regular  diet    Follow-up with Dr Dean  In 2 weeks    Signed:  Eloy Mccarthy  1/27/2018  7:39 PM

## 2018-01-28 NOTE — PLAN OF CARE
Problem: Patient Care Overview  Goal: Plan of Care/Patient Progress Review  Outcome: Adequate for Discharge Date Met: 01/27/18 01/27/18 4546   OTHER   Plan Of Care Reviewed With patient   Plan of Care Review   Progress improving   Patient discharged at 11:08 PM via ambulation accompanied by spouse and staff. Prescriptions sent with patient to fill . All belongings sent with patient.     Discharge instructions reviewed with Arabella. Listed belongings gathered and returned to patient. Room cleared with patient at time of discharge    Patient discharged to home.     Core Measures and Vaccines  Core Measures applicable during stay: none  Pneumonia and Influenza given prior to discharge, if indicated: No, pt declines    Surgical Patient   Surgical Procedures during stay: none  Did patient receive discharge instruction on wound care and recognition of infection symptoms? Yes    MISC  Follow up appointment made:  Yes  Home and hospital aquired medications returned to patient: N/A  Patient reports pain was well managed at discharge: Yes

## 2018-01-28 NOTE — DISCHARGE INSTRUCTIONS
Vaginal Delivery   Discharge Instructions    Activity: Go back to your normal activities    Diet: You may eat a regular diet. Drink plenty of fluids.      Call your Doctor  if you have any of these symptoms:    * You soak a sanitary pad with blood within 1 hour, or you see clots larger than a  golf ball.    * Bleeding that lasts more than 6 weeks.     *Bad-smelling fluid that comes out of your vagina.    *A fever above 100.4 degrees Fahrenheit (38.4 degrees Celsius) with or without chills.    * Severe pain, cramping, or tenderness in your lower belly area.    * Increased pain, swelling, redness or fluid around your stitches.    * A need to urinate more frequently (use the toilet more often), more urgently (use the toilet very quickly), or it burns when you urinate.    * Redness, swelling, or pain around a vein in your leg.    * Problems coping with sadness, anxiety, or depression.    * Problems breastfeeding, or a red or painful area on your breast.    * You have questions or concerns after you return home.      [facility]: [phone]

## 2018-01-28 NOTE — PLAN OF CARE
Face to face report given with opportunity to observe patient.    Report given to Iris Barcenas   1/27/2018  7:24 PM

## 2018-01-30 LAB — T PALLIDUM IGG+IGM SER QL: NEGATIVE

## 2018-02-14 ENCOUNTER — PRENATAL OFFICE VISIT (OUTPATIENT)
Dept: OBGYN | Facility: OTHER | Age: 28
End: 2018-02-14
Attending: OBSTETRICS & GYNECOLOGY
Payer: COMMERCIAL

## 2018-02-14 VITALS
BODY MASS INDEX: 27.94 KG/M2 | DIASTOLIC BLOOD PRESSURE: 60 MMHG | HEIGHT: 61 IN | HEART RATE: 68 BPM | SYSTOLIC BLOOD PRESSURE: 100 MMHG | WEIGHT: 148 LBS

## 2018-02-14 DIAGNOSIS — Z30.09 FAMILY PLANNING: Primary | ICD-10-CM

## 2018-02-14 PROCEDURE — 11981 INSERTION DRUG DLVR IMPLANT: CPT | Performed by: OBSTETRICS & GYNECOLOGY

## 2018-02-14 ASSESSMENT — ANXIETY QUESTIONNAIRES
5. BEING SO RESTLESS THAT IT IS HARD TO SIT STILL: NOT AT ALL
2. NOT BEING ABLE TO STOP OR CONTROL WORRYING: NOT AT ALL
1. FEELING NERVOUS, ANXIOUS, OR ON EDGE: NOT AT ALL
7. FEELING AFRAID AS IF SOMETHING AWFUL MIGHT HAPPEN: NOT AT ALL
3. WORRYING TOO MUCH ABOUT DIFFERENT THINGS: NOT AT ALL
6. BECOMING EASILY ANNOYED OR IRRITABLE: SEVERAL DAYS
GAD7 TOTAL SCORE: 3
IF YOU CHECKED OFF ANY PROBLEMS ON THIS QUESTIONNAIRE, HOW DIFFICULT HAVE THESE PROBLEMS MADE IT FOR YOU TO DO YOUR WORK, TAKE CARE OF THINGS AT HOME, OR GET ALONG WITH OTHER PEOPLE: NOT DIFFICULT AT ALL

## 2018-02-14 ASSESSMENT — PATIENT HEALTH QUESTIONNAIRE - PHQ9: 5. POOR APPETITE OR OVEREATING: MORE THAN HALF THE DAYS

## 2018-02-14 NOTE — PATIENT INSTRUCTIONS
Nexplanon should be removed or replaced in 3 years or sooner if desired.    Return to clinic in 4 weeks for postpartum exam.    Return for annual exam with Jamison Cuellar.

## 2018-02-14 NOTE — MR AVS SNAPSHOT
"              After Visit Summary   2018    Arabella Desai    MRN: [id number]           Patient Information     Date Of Birth          1990        Visit Information        Provider Department      2018 9:40 AM Kayla Dean MD [facility]        Today's Diagnoses     Family planning    -  1      Care Instructions    Nexplanon should be removed or replaced in 3 years or sooner if desired.    Return to clinic in 4 weeks for postpartum exam.    Return for annual exam with Jamison Cuellar.           Follow-ups after your visit        Who to contact     If you have questions or need follow up information about today's clinic visit or your schedule please contact [facility] directly at [phone].  Normal or non-critical lab and imaging results will be communicated to you by MyChart, letter or phone within 4 business days after the clinic has received the results. If you do not hear from us within 7 days, please contact the clinic through treadalonghart or phone. If you have a critical or abnormal lab result, we will notify you by phone as soon as possible.  Submit refill requests through [employer] or call your pharmacy and they will forward the refill request to us. Please allow 3 business days for your refill to be completed.          Additional Information About Your Visit        MyChart Information     [employer] lets you send messages to your doctor, view your test results, renew your prescriptions, schedule appointments and more. To sign up, go to [email or web address] . Click on \"Log in\" on the left side of the screen, which will take you to the Welcome page. Then click on \"Sign up Now\" on the right side of the page.     You will be asked to enter the access code listed below, as well as some personal information. Please follow the directions to create your username and password.     Your access code is: [email or web address]  Expires: 4/3/2018  9:13 AM     Your access code will  in " "90 days. If you need help or a new code, please call your [place] clinic or [phone].        Care EveryWhere ID     This is your Care EveryWhere ID. This could be used by other organizations to access your [facility] medical records  [id number]        Your Vitals Were     Pulse Height Last Period Breastfeeding? BMI (Body Mass Index)       68 5' 1\" (1.549 m) 05/01/2017 (Approximate) Yes 27.96 kg/m2        Blood Pressure from Last 3 Encounters:   02/14/18 100/60   01/27/18 118/74   01/24/18 118/76    Weight from Last 3 Encounters:   02/14/18 148 lb (67.1 kg)   01/24/18 158 lb (71.7 kg)   01/16/18 158 lb 8 oz (71.9 kg)              We Performed the Following     ETONOGESTREL IMPLANT SYSTEM     INSERTION NON-BIODEGRADABLE DRUG DELIVERY IMPLANT        Primary Care Provider Office Phone # Fax #    NITO Monroy [phone]       [facility] [address]  [place] [address]        Equal Access to Services     [facility]: Hadii aad ku hadasho Soomaali, waaxda luqadaha, qaybta kaalmada adeegyada, waxay [employer] . So [facility] [phone].    ATENCIÓN: Si habla español, tiene a boston disposición servicios gratuitos de asistencia lingüística. Kady al [phone].    We comply with applicable federal civil rights laws and [place] laws. We do not discriminate on the basis of race, color, national origin, age, disability, sex, sexual orientation, or gender identity.            Thank you!     [facility]  [facility] your [facility]. Our goal is always to provide you with excellent care. Hearing back from our patients is one way we can continue to improve our services. Please take a few minutes to complete the written survey that you may receive in the mail after your visit with us. Thank you!             Your Updated Medication List - Protect others around you: Learn how to safely use, store and throw away your medicines at " www.disposemymeds.org.          This list is accurate as of 2/14/18 10:05 AM.  Always use your most recent med list.                   Brand Name Dispense Instructions for use Diagnosis    lanolin ointment     40 g    Apply topically every hour as needed for dry skin (sore nipples)    Sore nipples due to lactation       prenatal multivitamin plus iron 27-0.8 MG Tabs per tablet      Take 1 tablet by mouth daily

## 2018-02-14 NOTE — NURSING NOTE
"Chief Complaint   Patient presents with     Contraception       Initial /60  Pulse 68  Ht 5' 1\" (1.549 m)  Wt 148 lb (67.1 kg)  LMP 05/01/2017 (Approximate)  BMI 27.96 kg/m2 Estimated body mass index is 27.96 kg/(m^2) as calculated from the following:    Height as of this encounter: 5' 1\" (1.549 m).    Weight as of this encounter: 148 lb (67.1 kg).  Medication Reconciliation: bette Matthews      "

## 2018-02-14 NOTE — PROGRESS NOTES
"Arabella Desai is a 28 year old female  /60  Pulse 68  Ht 5' 1\" (1.549 m)  Wt 148 lb (67.1 kg)  LMP 05/01/2017 (Approximate)  BMI 27.96 kg/m2  Generally well  Breast feeding:  ?        Baby name: Roger   Spontaneous vaginal delivery:    Stitches: n  PHQ9:  5  Bleeding:  Lightened up  Vulva:  kegel  Family planning:  Larc's discussed at length  Other concerns:  RBAQ's  Nexplanon inserted in left arm in usual manner without comps    Assessment:  Family planning    Plan: rto annual with RE  Return To Office: 4 wks for pp exam  After care discussed    Greater than 25 minutes were spent with this patient in addition to the procedure  Kayla Dean MD    "

## 2018-02-15 ASSESSMENT — PATIENT HEALTH QUESTIONNAIRE - PHQ9: SUM OF ALL RESPONSES TO PHQ QUESTIONS 1-9: 6

## 2018-02-15 ASSESSMENT — ANXIETY QUESTIONNAIRES: GAD7 TOTAL SCORE: 3

## 2018-03-19 ENCOUNTER — PRENATAL OFFICE VISIT (OUTPATIENT)
Dept: OBGYN | Facility: OTHER | Age: 28
End: 2018-03-19
Attending: OBSTETRICS & GYNECOLOGY
Payer: COMMERCIAL

## 2018-03-19 DIAGNOSIS — Z30.09 FAMILY PLANNING: Primary | ICD-10-CM

## 2018-03-19 PROCEDURE — 99207 ZZC POST PARTUM EXAM: CPT | Performed by: OBSTETRICS & GYNECOLOGY

## 2018-03-19 NOTE — NURSING NOTE
"Chief Complaint   Patient presents with     Post Partum Exam       Initial /68  Pulse 64  Ht 5' 1\" (1.549 m)  Wt 146 lb (66.2 kg)  LMP 05/01/2017 (Approximate)  BMI 27.59 kg/m2 Estimated body mass index is 27.59 kg/(m^2) as calculated from the following:    Height as of this encounter: 5' 1\" (1.549 m).    Weight as of this encounter: 146 lb (66.2 kg).  Medication Reconciliation: bette Matthews      "

## 2018-03-19 NOTE — PROGRESS NOTES
"Arabella Desai is a 28 year old female is 6 weeks post partum  /68  Pulse 64  Ht 5' 1\" (1.549 m)  Wt 146 lb (66.2 kg)  LMP 05/01/2017 (Approximate)  BMI 27.59 kg/m2  Generally well  Breast feeding:  y        Baby name: Roger   Spontaneous vaginal delivery: y  Stitches: n   PHQ9:  3  Bleeding:  Stopped and got a period  Vulva:  kegel  Family planning:  nexplanon  Other concerns:  Sex ok    Pelvic:  Vagina and vulva are normal; well healed, no discharge is noted.    Cervix: normal without lesions.    Uterus:   mobile,  normal in size and shape without tenderness.  Adnexa: without masses or tenderness.    Assessment:  Pp exam    Plan:   RE for appt for lactation  Has Annual    Greater than 25 minutes were spent with this patient  Kayla Dean MD    "

## 2018-03-19 NOTE — MR AVS SNAPSHOT
"              After Visit Summary   3/19/2018    Arabella Desai    MRN: [id number]           Patient Information     Date Of Birth          1990        Visit Information        Provider Department      3/19/2018 2:00 PM Kayla Dean MD [facility]        Today's Diagnoses     Family planning    -  1       Follow-ups after your visit        Your next 10 appointments already scheduled     Mar 13, 2019 10:00 AM CDT   (Arrive by 9:45 AM)   PHYSICAL with ANDREA Nieto CNM   [facility] [place] ([facility] [place] )    [address]  [place] [address]   [phone]              Who to contact     If you have questions or need follow up information about today's clinic visit or your schedule please contact [facility] directly at [phone].  Normal or non-critical lab and imaging results will be communicated to you by MyChart, letter or phone within 4 business days after the clinic has received the results. If you do not hear from us within 7 days, please contact the clinic through MyChart or phone. If you have a critical or abnormal lab result, we will notify you by phone as soon as possible.  Submit refill requests through [employer] or call your pharmacy and they will forward the refill request to us. Please allow 3 business days for your refill to be completed.          Additional Information About Your Visit        MyChart Information     [employer] lets you send messages to your doctor, view your test results, renew your prescriptions, schedule appointments and more. To sign up, go to [email or web address] . Click on \"Log in\" on the left side of the screen, which will take you to the Welcome page. Then click on \"Sign up Now\" on the right side of the page.     You will be asked to enter the access code listed below, as well as some personal information. Please follow the directions to create your username and password.     Your access code is: " "[email or web address]  Expires: 4/3/2018 10:13 AM     Your access code will  in 90 days. If you need help or a new code, please call your [facility] clinic or [phone].        Care EveryWhere ID     This is your Care EveryWhere ID. This could be used by other organizations to access your [facility] medical records  [id number]        Your Vitals Were     Pulse Height Last Period BMI (Body Mass Index)          64 5' 1\" (1.549 m) 2017 (Approximate) 27.59 kg/m2         Blood Pressure from Last 3 Encounters:   18 108/68   18 100/60   18 118/74    Weight from Last 3 Encounters:   18 146 lb (66.2 kg)   18 148 lb (67.1 kg)   18 158 lb (71.7 kg)              Today, you had the following     No orders found for display       Primary Care Provider Office Phone # Fax #    NITO Monroy [phone]       [facility] [address]  [facility] [address]        Equal Access to Services     [facility]: Hadii aad ku hadasho Soomaali, waaxda luqadaha, qaybta kaalmada adeegyada, waxay idiin hayaan ademickey kharajaya la'olgan . So [facility] [phone].    ATENCIÓN: Si habla español, tiene a boston disposición servicios gratuitos de asistencia lingüística. Llame al [phone].    We comply with applicable federal civil rights laws and [place] laws. We do not discriminate on the basis of race, color, national origin, age, disability, sex, sexual orientation, or gender identity.            Thank you!     Thank you for choosing [facility]  for your care. Our goal is always to provide you with excellent care. Hearing back from our patients is one way we can continue to improve our services. Please take a few minutes to complete the written survey that you may receive in the mail after your visit with us. Thank you!             Your Updated Medication List - Protect others around you: Learn how to safely use, store and throw away your medicines at " www.disposemymeds.org.          This list is accurate as of 3/19/18  2:19 PM.  Always use your most recent med list.                   Brand Name Dispense Instructions for use Diagnosis    etonogestrel 68 MG Impl    IMPLANON/NEXPLANON     1 each (68 mg) by Subdermal route continuous    Family planning       lanolin ointment     40 g    Apply topically every hour as needed for dry skin (sore nipples)    Sore nipples due to lactation       prenatal multivitamin plus iron 27-0.8 MG Tabs per tablet      Take 1 tablet by mouth daily

## 2018-03-22 VITALS
WEIGHT: 146 LBS | HEIGHT: 61 IN | DIASTOLIC BLOOD PRESSURE: 68 MMHG | HEART RATE: 64 BPM | SYSTOLIC BLOOD PRESSURE: 108 MMHG | BODY MASS INDEX: 27.56 KG/M2

## 2018-03-22 ASSESSMENT — ANXIETY QUESTIONNAIRES
3. WORRYING TOO MUCH ABOUT DIFFERENT THINGS: NOT AT ALL
GAD7 TOTAL SCORE: 2
IF YOU CHECKED OFF ANY PROBLEMS ON THIS QUESTIONNAIRE, HOW DIFFICULT HAVE THESE PROBLEMS MADE IT FOR YOU TO DO YOUR WORK, TAKE CARE OF THINGS AT HOME, OR GET ALONG WITH OTHER PEOPLE: NOT DIFFICULT AT ALL
5. BEING SO RESTLESS THAT IT IS HARD TO SIT STILL: NOT AT ALL
1. FEELING NERVOUS, ANXIOUS, OR ON EDGE: NOT AT ALL
6. BECOMING EASILY ANNOYED OR IRRITABLE: SEVERAL DAYS
2. NOT BEING ABLE TO STOP OR CONTROL WORRYING: NOT AT ALL
7. FEELING AFRAID AS IF SOMETHING AWFUL MIGHT HAPPEN: NOT AT ALL

## 2018-03-22 ASSESSMENT — PATIENT HEALTH QUESTIONNAIRE - PHQ9: 5. POOR APPETITE OR OVEREATING: SEVERAL DAYS

## 2018-03-23 ASSESSMENT — PATIENT HEALTH QUESTIONNAIRE - PHQ9: SUM OF ALL RESPONSES TO PHQ QUESTIONS 1-9: 5

## 2018-03-23 ASSESSMENT — ANXIETY QUESTIONNAIRES: GAD7 TOTAL SCORE: 2

## 2018-08-23 ENCOUNTER — APPOINTMENT (OUTPATIENT)
Dept: ULTRASOUND IMAGING | Facility: HOSPITAL | Age: 28
End: 2018-08-23
Attending: EMERGENCY MEDICINE
Payer: COMMERCIAL

## 2018-08-23 ENCOUNTER — APPOINTMENT (OUTPATIENT)
Dept: CT IMAGING | Facility: HOSPITAL | Age: 28
End: 2018-08-23
Attending: EMERGENCY MEDICINE
Payer: COMMERCIAL

## 2018-08-23 ENCOUNTER — HOSPITAL ENCOUNTER (EMERGENCY)
Facility: HOSPITAL | Age: 28
Discharge: HOME | End: 2018-08-23
Attending: EMERGENCY MEDICINE | Admitting: EMERGENCY MEDICINE
Payer: COMMERCIAL

## 2018-08-23 VITALS
TEMPERATURE: 97.7 F | HEART RATE: 101 BPM | OXYGEN SATURATION: 98 % | DIASTOLIC BLOOD PRESSURE: 67 MMHG | SYSTOLIC BLOOD PRESSURE: 120 MMHG | RESPIRATION RATE: 14 BRPM

## 2018-08-23 DIAGNOSIS — K57.32 DIVERTICULITIS OF COLON: ICD-10-CM

## 2018-08-23 DIAGNOSIS — R10.9 RIGHT SIDED ABDOMINAL PAIN: ICD-10-CM

## 2018-08-23 LAB
ALBUMIN SERPL-MCNC: 3.9 G/DL (ref 3.4–5)
ALBUMIN UR-MCNC: 10 MG/DL
ALP SERPL-CCNC: 74 U/L (ref 40–150)
ALT SERPL W P-5'-P-CCNC: 16 U/L (ref 0–50)
ANION GAP SERPL CALCULATED.3IONS-SCNC: 8 MMOL/L (ref 3–14)
APPEARANCE UR: CLEAR
AST SERPL W P-5'-P-CCNC: <3 U/L (ref 0–45)
BACTERIA #/AREA URNS HPF: ABNORMAL /HPF
BASOPHILS # BLD AUTO: 0 10E9/L (ref 0–0.2)
BASOPHILS NFR BLD AUTO: 0.3 %
BILIRUB SERPL-MCNC: 0.4 MG/DL (ref 0.2–1.3)
BILIRUB UR QL STRIP: NEGATIVE
BUN SERPL-MCNC: 9 MG/DL (ref 7–30)
CALCIUM SERPL-MCNC: 8.6 MG/DL (ref 8.5–10.1)
CHLORIDE SERPL-SCNC: 110 MMOL/L (ref 94–109)
CO2 SERPL-SCNC: 24 MMOL/L (ref 20–32)
COLOR UR AUTO: YELLOW
CREAT SERPL-MCNC: 0.62 MG/DL (ref 0.52–1.04)
DIFFERENTIAL METHOD BLD: ABNORMAL
EOSINOPHIL # BLD AUTO: 0.1 10E9/L (ref 0–0.7)
EOSINOPHIL NFR BLD AUTO: 0.5 %
ERYTHROCYTE [DISTWIDTH] IN BLOOD BY AUTOMATED COUNT: 13.1 % (ref 10–15)
GFR SERPL CREATININE-BSD FRML MDRD: >90 ML/MIN/1.7M2
GLUCOSE SERPL-MCNC: 98 MG/DL (ref 70–99)
GLUCOSE UR STRIP-MCNC: NEGATIVE MG/DL
HCT VFR BLD AUTO: 40.1 % (ref 35–47)
HGB BLD-MCNC: 13.7 G/DL (ref 11.7–15.7)
HGB UR QL STRIP: ABNORMAL
IMM GRANULOCYTES # BLD: 0 10E9/L (ref 0–0.4)
IMM GRANULOCYTES NFR BLD: 0.3 %
KETONES UR STRIP-MCNC: NEGATIVE MG/DL
LACTATE SERPL-SCNC: 1 MMOL/L (ref 0.4–2)
LEUKOCYTE ESTERASE UR QL STRIP: NEGATIVE
LIPASE SERPL-CCNC: 89 U/L (ref 73–393)
LYMPHOCYTES # BLD AUTO: 1 10E9/L (ref 0.8–5.3)
LYMPHOCYTES NFR BLD AUTO: 8 %
MCH RBC QN AUTO: 29.4 PG (ref 26.5–33)
MCHC RBC AUTO-ENTMCNC: 34.2 G/DL (ref 31.5–36.5)
MCV RBC AUTO: 86 FL (ref 78–100)
MONOCYTES # BLD AUTO: 0.8 10E9/L (ref 0–1.3)
MONOCYTES NFR BLD AUTO: 6.4 %
MUCOUS THREADS #/AREA URNS LPF: PRESENT /LPF
NEUTROPHILS # BLD AUTO: 10.5 10E9/L (ref 1.6–8.3)
NEUTROPHILS NFR BLD AUTO: 84.5 %
NITRATE UR QL: NEGATIVE
NRBC # BLD AUTO: 0 10*3/UL
NRBC BLD AUTO-RTO: 0 /100
PH UR STRIP: 6.5 PH (ref 4.7–8)
PLATELET # BLD AUTO: 208 10E9/L (ref 150–450)
POTASSIUM SERPL-SCNC: 3.6 MMOL/L (ref 3.4–5.3)
PROT SERPL-MCNC: 7.4 G/DL (ref 6.8–8.8)
RBC # BLD AUTO: 4.66 10E12/L (ref 3.8–5.2)
RBC #/AREA URNS AUTO: 2 /HPF (ref 0–2)
SODIUM SERPL-SCNC: 142 MMOL/L (ref 133–144)
SOURCE: ABNORMAL
SP GR UR STRIP: 1.01 (ref 1–1.03)
UROBILINOGEN UR STRIP-MCNC: NORMAL MG/DL (ref 0–2)
WBC # BLD AUTO: 12.4 10E9/L (ref 4–11)
WBC #/AREA URNS AUTO: 1 /HPF (ref 0–5)

## 2018-08-23 PROCEDURE — 25000128 H RX IP 250 OP 636: Performed by: RADIOLOGY

## 2018-08-23 PROCEDURE — 36415 COLL VENOUS BLD VENIPUNCTURE: CPT | Performed by: EMERGENCY MEDICINE

## 2018-08-23 PROCEDURE — 99285 EMERGENCY DEPT VISIT HI MDM: CPT | Mod: Z6 | Performed by: EMERGENCY MEDICINE

## 2018-08-23 PROCEDURE — 83605 ASSAY OF LACTIC ACID: CPT | Performed by: EMERGENCY MEDICINE

## 2018-08-23 PROCEDURE — 96375 TX/PRO/DX INJ NEW DRUG ADDON: CPT

## 2018-08-23 PROCEDURE — 96376 TX/PRO/DX INJ SAME DRUG ADON: CPT

## 2018-08-23 PROCEDURE — 83690 ASSAY OF LIPASE: CPT | Performed by: EMERGENCY MEDICINE

## 2018-08-23 PROCEDURE — 85025 COMPLETE CBC W/AUTO DIFF WBC: CPT | Performed by: EMERGENCY MEDICINE

## 2018-08-23 PROCEDURE — 25000128 H RX IP 250 OP 636: Performed by: EMERGENCY MEDICINE

## 2018-08-23 PROCEDURE — 99285 EMERGENCY DEPT VISIT HI MDM: CPT | Mod: 25

## 2018-08-23 PROCEDURE — 96374 THER/PROPH/DIAG INJ IV PUSH: CPT | Mod: XU

## 2018-08-23 PROCEDURE — 80053 COMPREHEN METABOLIC PANEL: CPT | Performed by: EMERGENCY MEDICINE

## 2018-08-23 PROCEDURE — 74177 CT ABD & PELVIS W/CONTRAST: CPT | Mod: TC

## 2018-08-23 PROCEDURE — 81001 URINALYSIS AUTO W/SCOPE: CPT | Performed by: EMERGENCY MEDICINE

## 2018-08-23 PROCEDURE — 96361 HYDRATE IV INFUSION ADD-ON: CPT

## 2018-08-23 PROCEDURE — 76705 ECHO EXAM OF ABDOMEN: CPT | Mod: TC

## 2018-08-23 RX ORDER — SODIUM CHLORIDE 9 MG/ML
INJECTION, SOLUTION INTRAVENOUS CONTINUOUS
Status: DISCONTINUED | OUTPATIENT
Start: 2018-08-23 | End: 2018-08-23 | Stop reason: HOSPADM

## 2018-08-23 RX ORDER — CIPROFLOXACIN 500 MG/1
500 TABLET, FILM COATED ORAL 2 TIMES DAILY
Qty: 20 TABLET | Refills: 0 | Status: SHIPPED | OUTPATIENT
Start: 2018-08-23 | End: 2018-09-06

## 2018-08-23 RX ORDER — IOPAMIDOL 612 MG/ML
100 INJECTION, SOLUTION INTRAVASCULAR ONCE
Status: COMPLETED | OUTPATIENT
Start: 2018-08-23 | End: 2018-08-23

## 2018-08-23 RX ORDER — ONDANSETRON 2 MG/ML
4 INJECTION INTRAMUSCULAR; INTRAVENOUS ONCE
Status: COMPLETED | OUTPATIENT
Start: 2018-08-23 | End: 2018-08-23

## 2018-08-23 RX ORDER — METRONIDAZOLE 500 MG/1
500 TABLET ORAL 4 TIMES DAILY
Qty: 40 TABLET | Refills: 0 | Status: SHIPPED | OUTPATIENT
Start: 2018-08-23 | End: 2018-09-06

## 2018-08-23 RX ADMIN — HYDROMORPHONE HYDROCHLORIDE 0.2 MG: 1 INJECTION, SOLUTION INTRAMUSCULAR; INTRAVENOUS; SUBCUTANEOUS at 07:00

## 2018-08-23 RX ADMIN — IOPAMIDOL 100 ML: 612 INJECTION, SOLUTION INTRAVENOUS at 08:31

## 2018-08-23 RX ADMIN — ONDANSETRON 4 MG: 2 INJECTION INTRAMUSCULAR; INTRAVENOUS at 07:47

## 2018-08-23 RX ADMIN — SODIUM CHLORIDE: 9 INJECTION, SOLUTION INTRAVENOUS at 05:36

## 2018-08-23 RX ADMIN — HYDROMORPHONE HYDROCHLORIDE 0.2 MG: 1 INJECTION, SOLUTION INTRAMUSCULAR; INTRAVENOUS; SUBCUTANEOUS at 05:36

## 2018-08-23 ASSESSMENT — ENCOUNTER SYMPTOMS
RESPIRATORY NEGATIVE: 1
CHILLS: 0
EYES NEGATIVE: 1
APPETITE CHANGE: 1
CONSTIPATION: 0
JOINT SWELLING: 0
NEUROLOGICAL NEGATIVE: 1
COLOR CHANGE: 0
CONFUSION: 0
BACK PAIN: 0
DIARRHEA: 0
FEVER: 0
ACTIVITY CHANGE: 1
ABDOMINAL PAIN: 1
SLEEP DISTURBANCE: 1
NAUSEA: 1
CARDIOVASCULAR NEGATIVE: 1

## 2018-08-23 NOTE — ED NOTES
Pt resting on cart. Pt concerned because US was of gallbladder and upper abd but her pain is more in the lower abd. Dr. Hillman notified.

## 2018-08-23 NOTE — ED NOTES
"Patient ambulatory to room 1. Patient states that after eating \"really rich dinner\" on Sunday she started having abdominal pain and thought her stomach was \"just upset\". Pt notes RUQ and RLQ pain since with nausea. Patient has also been having soft/loose stools, about x4/day and on Tuesday had a \"really dark one\". Pt into gown and call light in reach.   "

## 2018-08-23 NOTE — DISCHARGE INSTRUCTIONS
Diverticulitis    Some people get pouches along the wall of the colon as they get older. The pouches, called diverticuli, usually cause no symptoms. If the pouches become blocked, you can get an infection. This infection is called diverticulitis. It causes pain in your lower abdomen and fever. If not treated, it can become a serious condition, causing an abscess to form inside the pouch. The abscess may block the intestinal tract even or rupture, spreading infection throughout the abdomen.  When treatment is started early, oral antibiotics alone may be enough to cure diverticulitis. This method is tried first. But, if you don't improve or if your condition gets worse while using oral antibiotics, you may need to be admitted to the hospital for IV antibiotics. Severe cases may require surgery.  Home care  The following guidelines will help you care for yourself at home:    During the acute illness, rest and follow your healthcare provider's instructions about diet. Sometimes you will need to follow a clear liquid diet to rest your bowel. Once your symptoms are better, you may be told to follow a low-fiber diet for some time. Include foods like:  ? Flake cereal, mashed potatoes, pancakes, waffles, pasta, white bread, rice, applesauce, bananas, eggs, fish, poultry, tofu, and cooked soft vegetables    Take antibiotics exactly as instructed. Don't miss any doses or stop taking the medication, even if you feel better.    Monitor your temperature and tell your healthcare provider if you have rising temperatures.  Preventing future attacks  Once you have an episode of diverticulitis, you are at risk for having it again. After you have recovered from this episode, you may be able to lower your risk by eating a high-fiber diet (20 gm/day to 35 gm/day of fiber). This cleans out the colon pouches that already exist and may prevent new ones from forming. Foods high in fiber include fresh fruits and edible peelings, raw or  lightly cooked vegetables, whole grain cereals and breads, dried beans and peas, and bran.  Other steps that can help prevent future attacks include:    Take your medicines, such as antibiotics, as your healthcare provider says.    Drink 6 to 8 glasses of water every day, unless told otherwise.    Use a heating pad or hot water bottle to help abdominal cramping or pain.    Begin an exercise program. Ask your healthcare provider how to get started. You can benefit from simple activities such as walking or gardening.    Treat diarrhea with a bland diet. Start with liquids only; then slowly add fiber over time.    Watch for changes in your bowel movements (constipation to diarrhea). Avoid constipation by eating a high fiber diet and taking a stool softener if needed.    Get plenty of rest and sleep.  Follow-up care  Follow up with your healthcare provider as advised or sooner if you are not getting better in the next 2 days.  When to seek medical advice  Call your healthcare provider right away if any of these occur:    Fever of 100.4 F (38 C) or higher, or as directed by your healthcare provider    Repeated vomiting or swelling of the abdomen    Weakness, dizziness, light-headedness    Pain in your abdomen that gets worse, severe, or spreads to your back    Pain that moves to the right lower abdomen    Rectal bleeding (stools that are red, black or maroon color)    Unexpected vaginal bleeding  Date Last Reviewed: 9/1/2016 2000-2017 [employer]. [address] [place] [id number]. All rights reserved. This information is not intended as a substitute for professional medical care. Always follow your healthcare professional's instructions.      What to expect when you have contrast    During your exam, we will inject  contrast  into your vein or artery. (Contrast is a clear liquid with iodine in it. It shows up on X-rays.)    You may feel warm or hot. You may have a metal taste in your mouth and a  slight upset stomach. You may also feel pressure near the kidneys and bladder. These effects will last about 1 to 3 minutes.    Please tell us if you have:    Sneezing     Itching    Hives     Swelling in the face    A hoarse voice    Breathing problems    Other new symptoms    Serious problems are rare.  They may include:    Irregular heartbeat     Seizures    Kidney failure              Tissue damage    Shock      Death    If you have any problems during the exam, we  will treat them right away.    When you get home    Call your hospital if you have any new symptoms in the next 2 days, like hives or swelling. (Phone numbers are at the bottom of this page.) Or call your family doctor.     If you have wheezing or trouble breathing, call 911.    Self-care  -Drink at least 4 extra glasses of water today.   This reduces the stress on your kidneys.  -Keep taking your regular medicines.    The contrast will pass out of your body in your  Urine(pee). This will happen in the next 24 hours. You  will not feel this. Your urine will not  change color.    If you have kidney problems or take metformin    Drink 4 to 8 large glasses of water for the next  2 days, if you are not on a fluid restriction.    ?If you take metformin (Glucophage or Glucovance) for diabetes, keep taking it.      ?Your kidney function tests are abnormal.  If you take Metformin, do not take it for 48 hours. Please go to your clinic for a blood test within 3 days after your exam before the restarting this medicine.     (Note to provider:please give patient prescription for lab tests.)    ?Special instructions:     I have read and understand the above information.    Patient Sign Here:______________________________________Date:________Time:______    Staff Sign Here:________________________________________Date:_______Time:______      Radiology Departments:     ?[facility]: [phone] ?[facility]: [phone]     ?[facility]:  [phone] ?[facility]:[phone]      ?[facility]: [phone]  ?[facility]: [phone]  ?[facility]:[phone]    ?[facility]:[phone]  ?[facility]:[phone]

## 2018-08-23 NOTE — ED PROVIDER NOTES
History     Chief Complaint   Patient presents with     Abdominal Pain     x4 days, RUQ     Nausea     no vomiting     HPI Comments: 05.09  Drove herself here, mom who works in oncology met her here    Chief complaint  Right-sided abdominal pain    HPI  28-year-old female with onset 2 days ago with abdominal pain but getting much worse at night waking her up and associated with nausea but no vomiting.  Aggravated by driving over bumps in the road and by palpation in the area.  No back pain no fever    No history of similar pain previously  Positive family history for kidney stones in some relatives, and for gallstones in her mother    The history is provided by the patient, medical records and a parent.       Problem List:    Patient Active Problem List    Diagnosis Date Noted     Normal labor and delivery 2018     Priority: Medium     Encounter for triage in pregnant patient 2017     Priority: Medium     Chronic cough 2017     Priority: Medium     Glucose intolerance of pregnancy 2017     Priority: Medium     EFW 38 wks       Chronic seasonal allergic rhinitis due to pollen 2017     Priority: Medium     Increased nuchal translucency space on fetal ultrasound 2017     Priority: Medium     Neg cell free DNA       Abnormal ultrasound 2017     Priority: Medium     Cell free DNA neg        (spontaneous vaginal delivery) 2016     Priority: Medium     Dean old scar       History of depression 2015     Priority: Medium     Grief and postpartum. Patient believes pp depression may have been associated with breast feeding aggression from nurses       Need for influenza vaccination 2015     Priority: Medium     Defers 17         Need for Tdap vaccination 2015     Priority: Medium     DONE 17       Supervision of other normal pregnancy, antepartum 2015     Priority: Medium     STRETCH Dr. Kaminski          Proteinuria 2015      Priority: Medium     Tobacco use disorder 05/17/2012     Priority: Medium     Pneumovax done 7/10/17          Past Medical History:    Past Medical History:   Diagnosis Date     Chronic interstitial cystitis 8/12/2011     Tobacco use disorder 5/17/2012       Past Surgical History:    Past Surgical History:   Procedure Laterality Date     CYSTOSCOPY         Family History:    Family History   Problem Relation Age of Onset     Other - See Comments Paternal Grandmother      brain aneurysm     Cancer Maternal Grandmother      lung     Diabetes Maternal Grandmother      HEART DISEASE Other 70     myocardial infarction       Social History:  Marital Status:   [2]  Social History   Substance Use Topics     Smoking status: Current Every Day Smoker     Packs/day: 0.50     Types: Cigarettes     Smokeless tobacco: Never Used      Comment: RX for patches done. declines Quitline referral     Alcohol use 0.0 oz/week     0 Standard drinks or equivalent per week      Comment: occasional        Medications:      etonogestrel (IMPLANON/NEXPLANON) 68 MG IMPL         Review of Systems   Constitutional: Positive for activity change and appetite change. Negative for chills and fever.   HENT: Negative.    Eyes: Negative.    Respiratory: Negative.    Cardiovascular: Negative.    Gastrointestinal: Positive for abdominal pain and nausea. Negative for constipation and diarrhea.   Musculoskeletal: Negative for back pain, gait problem and joint swelling.   Skin: Negative for color change and rash.   Neurological: Negative.    Psychiatric/Behavioral: Positive for sleep disturbance. Negative for confusion.       Physical Exam   BP: 136/81  Pulse: (!) 128  Heart Rate: 109  Temp: 98.6  F (37  C)  Resp: 18  SpO2: 100 %      Physical Exam   Constitutional: She is oriented to person, place, and time. She appears well-developed and well-nourished. No distress.   She appears quite uncomfortable  Tachycardic rate 128  Vital signs otherwise normal    HENT:   Head: Normocephalic.   Mouth/Throat: Oropharynx is clear and moist.   Eyes: Conjunctivae are normal. Pupils are equal, round, and reactive to light.   Neck: Neck supple.   Cardiovascular: Regular rhythm and intact distal pulses.    No murmur heard.  Pulmonary/Chest: Effort normal and breath sounds normal.   Abdominal: Soft. Bowel sounds are normal. She exhibits no distension and no mass. There is tenderness ( Right upper quadrant with guarding). There is guarding. There is no rebound.   Musculoskeletal: She exhibits no edema or tenderness.   Lymphadenopathy:     She has no cervical adenopathy.   Neurological: She is alert and oriented to person, place, and time. She exhibits normal muscle tone.   Skin: Skin is warm and dry. No rash noted. She is not diaphoretic. No erythema. No pallor.   Psychiatric: Her behavior is normal.   Nursing note and vitals reviewed.      ED Course     ED Course   Value Comment Time   WBC: (!) 12.4 12.4 WBC 08/23 0607   Lipase: 89 Normal lipase and lactate 08/23 0608   Leukocyte Esterase Urine: Negative Urinalysis within normal limits 08/23 0623   Bilirubin Total: 0.4 Liver tests are normal 08/23 0630    Elevated WBCRecommend ultrasoundPatient took 0.2 mg of hydromorphone which gave her headache and helped relieve the pain a little bit.  She does not want more pain medication at this time 08/23 0652    Requesting a second dose of hydromorphone 0.2 mg 08/23 0654    Ultrasound negative for any abnormality on preliminary report.Repeat evaluation shows significant tenderness right mid abdomen.  New guarding presentOndansetron 4 mg IV for nausea.CT abdomen pelvis with IV contrast 08/23 0745     Procedures          Intravenous saline, hydromorphone 0.2 mg and repeat as needed, labs ordered         Results for orders placed or performed during the hospital encounter of 08/23/18 (from the past 24 hour(s))   CBC with platelets differential   Result Value Ref [facility]    WBC 12.4 (H) 4.0 -  11.0 10e9/L    RBC Count 4.66 3.8 - 5.2 10e12/L    Hemoglobin 13.7 11.7 - 15.7 g/dL    Hematocrit 40.1 35.0 - 47.0 %    MCV 86 78 - 100 fl    MCH 29.4 26.5 - 33.0 pg    MCHC 34.2 31.5 - 36.5 g/dL    RDW 13.1 10.0 - 15.0 %    Platelet Count 208 150 - 450 10e9/L    Diff Method Automated Method     % Neutrophils 84.5 %    % Lymphocytes 8.0 %    % Monocytes 6.4 %    % Eosinophils 0.5 %    % Basophils 0.3 %    % Immature Granulocytes 0.3 %    Nucleated RBCs 0 0 /100    Absolute Neutrophil 10.5 (H) 1.6 - 8.3 10e9/L    Absolute Lymphocytes 1.0 0.8 - 5.3 10e9/L    Absolute Monocytes 0.8 0.0 - 1.3 10e9/L    Absolute Eosinophils 0.1 0.0 - 0.7 10e9/L    Absolute Basophils 0.0 0.0 - 0.2 10e9/L    Abs Immature Granulocytes 0.0 0 - 0.4 10e9/L    Absolute Nucleated RBC 0.0    Lipase   Result Value Ref [facility]    Lipase 89 73 - 393 U/L   Lactic acid   Result Value Ref [facility]    Lactic Acid 1.0 0.4 - 2.0 mmol/L   Comprehensive metabolic panel   Result Value Ref [facility]    Sodium 142 133 - 144 mmol/L    Potassium 3.6 3.4 - 5.3 mmol/L    Chloride 110 (H) 94 - 109 mmol/L    Carbon Dioxide 24 20 - 32 mmol/L    Anion Gap 8 3 - 14 mmol/L    Glucose 98 70 - 99 mg/dL    Urea Nitrogen 9 7 - 30 mg/dL    Creatinine 0.62 0.52 - 1.04 mg/dL    GFR Estimate >90 >60 mL/min/1.7m2    GFR Estimate If Black >90 >60 mL/min/1.7m2    Calcium 8.6 8.5 - 10.1 mg/dL    Bilirubin Total 0.4 0.2 - 1.3 mg/dL    Albumin 3.9 3.4 - 5.0 g/dL    Protein Total 7.4 6.8 - 8.8 g/dL    Alkaline Phosphatase 74 40 - 150 U/L    ALT 16 0 - 50 U/L    AST <3 0 - 45 U/L   UA with Microscopic   Result Value Ref [facility]    Color Urine Yellow     Appearance Urine Clear     Glucose Urine Negative NEG^Negative mg/dL    Bilirubin Urine Negative NEG^Negative    Ketones Urine Negative NEG^Negative mg/dL    Specific Gravity Urine 1.015 1.003 - 1.035    Blood Urine Small (A) NEG^Negative    pH Urine 6.5 4.7 - 8.0 pH    Protein Albumin Urine 10 (A) NEG^Negative mg/dL    Urobilinogen mg/dL Normal  0.0 - 2.0 mg/dL    Nitrite Urine Negative NEG^Negative    Leukocyte Esterase Urine Negative NEG^Negative    Source Midstream Urine     WBC Urine 1 0 - 5 /HPF    RBC Urine 2 0 - 2 /HPF    Bacteria Urine None (A) NEG^Negative /HPF    Mucous Urine Present (A) NEG^Negative /LPF       Medications   sodium chloride (PF) 0.9% PF flush 3 mL (not administered)   sodium chloride 0.9% infusion ( Intravenous New Bag 8/23/18 0536)   HYDROmorphone (DILAUDID) injection 0.2 mg (0.2 mg Intravenous Given 8/23/18 0700)   ondansetron (ZOFRAN) injection 4 mg (not administered)       Assessments & Plan (with Medical Decision Making)     I have reviewed the nursing notes.    I have reviewed the findings, diagnosis, plan and need for follow up with the patient.  28-year-old female with predominantly right upper quadrant pain associated with nausea.  Very tender to examination  Differential diagnosis includes biliary colic appendicitis colitis gastroenteritis among others    New Prescriptions    No medications on file       Final diagnoses:   Right sided abdominal pain     Transferred to care of Dr. Lee pending results and disposition      8/23/2018   [facility]     Nasir Hillman MD  08/23/18 [id number]

## 2018-08-23 NOTE — ED AVS SNAPSHOT
[facility]    [address]    [place] [address]    Phone:  [phone]                                       Arabella Desai   MRN: [id number]    Department:  [facility]   Date of Visit:  8/23/2018           Patient Information     Date Of Birth          1990        Your diagnoses for this visit were:     Right sided abdominal pain     Diverticulitis of colon        You were seen by Nasir Hillman MD and Lady Phillip MD.      Follow-up Information     Follow up with Anayeli Kaminski MD.    Specialty:  Family Practice    Why:  As needed if not improving     Contact information:    [place] [address]  [phone]          Discharge Instructions           Diverticulitis    Some people get pouches along the wall of the colon as they get older. The pouches, called diverticuli, usually cause no symptoms. If the pouches become blocked, you can get an infection. This infection is called diverticulitis. It causes pain in your lower abdomen and fever. If not treated, it can become a serious condition, causing an abscess to form inside the pouch. The abscess may block the intestinal tract even or rupture, spreading infection throughout the abdomen.  When treatment is started early, oral antibiotics alone may be enough to cure diverticulitis. This method is tried first. But, if you don't improve or if your condition gets worse while using oral antibiotics, you may need to be admitted to the hospital for IV antibiotics. Severe cases may require surgery.  Home care  The following guidelines will help you care for yourself at home:    During the acute illness, rest and follow your healthcare provider's instructions about diet. Sometimes you will need to follow a clear liquid diet to rest your bowel. Once your symptoms are better, you may be told to follow a low-fiber diet for some time. Include foods like:  ? Flake cereal, mashed potatoes,  pancakes, waffles, pasta, white bread, rice, applesauce, bananas, eggs, fish, poultry, tofu, and cooked soft vegetables    Take antibiotics exactly as instructed. Don't miss any doses or stop taking the medication, even if you feel better.    Monitor your temperature and tell your healthcare provider if you have rising temperatures.  Preventing future attacks  Once you have an episode of diverticulitis, you are at risk for having it again. After you have recovered from this episode, you may be able to lower your risk by eating a high-fiber diet (20 gm/day to 35 gm/day of fiber). This cleans out the colon pouches that already exist and may prevent new ones from forming. Foods high in fiber include fresh fruits and edible peelings, raw or lightly cooked vegetables, whole grain cereals and breads, dried beans and peas, and bran.  Other steps that can help prevent future attacks include:    Take your medicines, such as antibiotics, as your healthcare provider says.    Drink 6 to 8 glasses of water every day, unless told otherwise.    Use a heating pad or hot water bottle to help abdominal cramping or pain.    Begin an exercise program. Ask your healthcare provider how to get started. You can benefit from simple activities such as walking or gardening.    Treat diarrhea with a bland diet. Start with liquids only; then slowly add fiber over time.    Watch for changes in your bowel movements (constipation to diarrhea). Avoid constipation by eating a high fiber diet and taking a stool softener if needed.    Get plenty of rest and sleep.  Follow-up care  Follow up with your healthcare provider as advised or sooner if you are not getting better in the next 2 days.  When to seek medical advice  Call your healthcare provider right away if any of these occur:    Fever of 100.4 F (38 C) or higher, or as directed by your healthcare provider    Repeated vomiting or swelling of the abdomen    Weakness, dizziness,  light-headedness    Pain in your abdomen that gets worse, severe, or spreads to your back    Pain that moves to the right lower abdomen    Rectal bleeding (stools that are red, black or maroon color)    Unexpected vaginal bleeding  Date Last Reviewed: 9/1/2016 2000-2017 [employer]. [address] [place] [address]. All rights reserved. This information is not intended as a substitute for professional medical care. Always follow your healthcare professional's instructions.      What to expect when you have contrast    During your exam, we will inject  contrast  into your vein or artery. (Contrast is a clear liquid with iodine in it. It shows up on X-rays.)    You may feel warm or hot. You may have a metal taste in your mouth and a slight upset stomach. You may also feel pressure near the kidneys and bladder. These effects will last about 1 to 3 minutes.    Please tell us if you have:    Sneezing     Itching    Hives     Swelling in the face    A hoarse voice    Breathing problems    Other new symptoms    Serious problems are rare.  They may include:    Irregular heartbeat     Seizures    Kidney failure              Tissue damage    Shock      Death    If you have any problems during the exam, we  will treat them right away.    When you get home    Call your hospital if you have any new symptoms in the next 2 days, like hives or swelling. (Phone numbers are at the bottom of this page.) Or call your family doctor.     If you have wheezing or trouble breathing, call 911.    Self-care  -Drink at least 4 extra glasses of water today.   This reduces the stress on your kidneys.  -Keep taking your regular medicines.    The contrast will pass out of your body in your  Urine(pee). This will happen in the next 24 hours. You  will not feel this. Your urine will not  change color.    If you have kidney problems or take metformin    Drink 4 to 8 large glasses of water for the next  2 days, if you are not  on a fluid restriction.    ?If you take metformin (Glucophage or Glucovance) for diabetes, keep taking it.      ?Your kidney function tests are abnormal.  If you take Metformin, do not take it for 48 hours. Please go to your clinic for a blood test within 3 days after your exam before the restarting this medicine.     (Note to provider:please give patient prescription for lab tests.)    ?Special instructions:     I have read and understand the above information.    Patient Sign Here:______________________________________Date:________Time:______    Staff Sign Here:________________________________________Date:_______Time:______      Radiology Departments:     ?[facility]: [phone] ?[facility]: [phone]     ?[place]: [phone] ?[facility]:[phone]      ?[place]: [phone]  ?[place]: [phone]  ?[place]:[phone]    ?[facility]:[phone]  ?[facility]:[phone]    Your next 10 appointments already scheduled     Mar 13, 2019 10:00 AM CDT   (Arrive by 9:45 AM)   PHYSICAL with ANDREA Nieto CNM   [facility] ([facility] )    [address]  [place] [address]   [phone]                 Review of your medicines      START taking        Dose / Directions Last dose taken    ciprofloxacin 500 MG tablet   Commonly known as:  CIPRO   Dose:  500 mg   Quantity:  20 tablet        Take 1 tablet (500 mg) by mouth 2 times daily for 10 days   Refills:  0        metroNIDAZOLE 500 MG tablet   Commonly known as:  FLAGYL   Dose:  500 mg   Quantity:  40 tablet        Take 1 tablet (500 mg) by mouth 4 times daily for 10 days   Refills:  0          Our records show that you are taking the medicines listed below. If these are incorrect, please call your family doctor or clinic.        Dose / Directions Last dose taken    etonogestrel 68 MG Impl   Commonly known as:  IMPLANON/NEXPLANON   Dose:  1 each        1 each (68 mg) by Subdermal route continuous   Refills:  " 0                Prescriptions were sent or printed at these locations (2 Prescriptions)                   [id number] - [place] - [address] [address]    Telephone:  [phone]   Fax:  [phone]   Hours:                  E-Prescribed (2 of 2)         ciprofloxacin (CIPRO) 500 MG tablet               metroNIDAZOLE (FLAGYL) 500 MG tablet                Procedures and tests performed during your visit     Abdomen US, limited (RUQ only)    CBC with platelets differential    CT Abdomen Pelvis w Contrast    Comprehensive metabolic panel    Lactic acid    Lipase    Peripheral IV: Standard    UA with Microscopic      Orders Needing Specimen Collection     None      Pending Results     No orders found from 2018 to 2018.            Pending Culture Results     No orders found from 2018 to 2018.            Thank you for choosing [facility]       Thank you for choosing [facility] for your care. Our goal is always to provide you with excellent care. Hearing back from our patients is one way we can continue to improve our services. Please take a few minutes to complete the written survey that you may receive in the mail after you visit with us. Thank you!        [employer] Information     [employer] lets you send messages to your doctor, view your test results, renew your prescriptions, schedule appointments and more. To sign up, go to [email or web address] . Click on \"Log in\" on the left side of the screen, which will take you to the Welcome page. Then click on \"Sign up Now\" on the right side of the page.     You will be asked to enter the access code listed below, as well as some personal information. Please follow the directions to create your username and password.     Your access code is: [email or web address]  Expires: 2018  8:32 AM     Your access code will  in 90 days. If you need help or a new code, please call your [facility] clinic or [phone].        Care " EveryWhere ID     This is your Care EveryWhere ID. This could be used by other organizations to access your [facility] medical records  [id number]        Equal Access to Services     ERIK SOMMERS : Humaira Maneul, kathy pritchett, naz palacios, fe hoffman. So [facility] [phone].    ATENCIÓN: Si habla español, tiene a boston disposición servicios gratuitos de asistencia lingüística. Llame al [phone].    We comply with applicable federal civil rights laws and [place] laws. We do not discriminate on the basis of race, color, national origin, age, disability, sex, sexual orientation, or gender identity.            After Visit Summary       This is your record. Keep this with you and show to your community pharmacist(s) and doctor(s) at your next visit.

## 2018-08-23 NOTE — ED AVS SNAPSHOT
[facility]    [address] [address]    Phone:  [phone]                                       Arabella Desai   MRN: [id number]    Department:  [facility]   Date of Visit:  8/23/2018           After Visit Summary Signature Page     I have received my discharge instructions, and my questions have been answered. I have discussed any challenges I see with this plan with the nurse or doctor.    ..........................................................................................................................................  Patient/Patient Representative Signature      ..........................................................................................................................................  Patient Representative Print Name and Relationship to Patient    ..................................................               ................................................  Date                                            Time    ..........................................................................................................................................  Reviewed by Signature/Title    ...................................................              ..............................................  Date                                                            Time

## 2018-08-23 NOTE — ED NOTES
Patient discharged to home with family. Patient and family verbalize/demonstrate understanding of all written and verbal instructions. Prescription e-scribed to local pharmacy. All questions answered.

## 2018-08-23 NOTE — ED NOTES
Patient advised prior to receiving dilaudid that she will need a ride home and patient verbalizes understanding.

## 2018-08-23 NOTE — ED NOTES
Face to face report given with opportunity to observe patient.    Report given to ELIUD Woodard   8/23/2018  7:04 AM

## 2018-08-24 NOTE — ED PROVIDER NOTES
History     Chief Complaint   Patient presents with     Abdominal Pain     x4 days, RUQ     Nausea     no vomiting     HPI  Arabella Desai is a 28 year old female who I assumed care from Dr Hillman while awaiting results of CT scan evaluating abdominal pain     Problem List:    Patient Active Problem List    Diagnosis Date Noted     Normal labor and delivery 2018     Priority: Medium     Encounter for triage in pregnant patient 2017     Priority: Medium     Chronic cough 2017     Priority: Medium     Glucose intolerance of pregnancy 2017     Priority: Medium     EFW 38 wks       Chronic seasonal allergic rhinitis due to pollen 2017     Priority: Medium     Increased nuchal translucency space on fetal ultrasound 2017     Priority: Medium     Neg cell free DNA       Abnormal ultrasound 2017     Priority: Medium     Cell free DNA neg        (spontaneous vaginal delivery) 2016     Priority: Medium     Dean old scar       History of depression 2015     Priority: Medium     Grief and postpartum. Patient believes pp depression may have been associated with breast feeding aggression from nurses       Need for influenza vaccination 2015     Priority: Medium     Defers 17         Need for Tdap vaccination 2015     Priority: Medium     DONE 17       Supervision of other normal pregnancy, antepartum 2015     Priority: Medium     STRETCH Dr. Kaminski          Proteinuria 2015     Priority: Medium     Tobacco use disorder 2012     Priority: Medium     Pneumovax done 7/10/17          Past Medical History:    Past Medical History:   Diagnosis Date     Chronic interstitial cystitis 2011     Tobacco use disorder 2012       Past Surgical History:    Past Surgical History:   Procedure Laterality Date     CYSTOSCOPY         Family History:    Family History   Problem Relation Age of Onset     Other - See Comments Paternal  Grandmother      brain aneurysm     Cancer Maternal Grandmother      lung     Diabetes Maternal Grandmother      HEART DISEASE Other 70     myocardial infarction       Social History:  Marital Status:   [2]  Social History   Substance Use Topics     Smoking status: Current Every Day Smoker     Packs/day: 0.50     Types: Cigarettes     Smokeless tobacco: Never Used      Comment: RX for patches done. declines Quitline referral     Alcohol use 0.0 oz/week     0 Standard drinks or equivalent per week      Comment: occasional        Medications:      ciprofloxacin (CIPRO) 500 MG tablet   etonogestrel (IMPLANON/NEXPLANON) 68 MG IMPL   metroNIDAZOLE (FLAGYL) 500 MG tablet         Review of Systems  Per Dr Hillman   Physical Exam   BP: 136/81  Pulse: (!) 128  Heart Rate: 109  Temp: 98.6  F (37  C)  Resp: 18  SpO2: 100 %      Physical Examper DR Hillman     ED Course     ED Course   Value Comment Time   WBC: (!) 12.4 12.4 WBC 08/23 0607   Lipase: 89 Normal lipase and lactate 08/23 0608   Leukocyte Esterase Urine: Negative Urinalysis within normal limits 08/23 0623   Bilirubin Total: 0.4 Liver tests are normal 08/23 0630    Elevated WBCRecommend ultrasoundPatient took 0.2 mg of hydromorphone which gave her headache and helped relieve the pain a little bit.  She does not want more pain medication at this time 08/23 0652    Requesting a second dose of hydromorphone 0.2 mg 08/23 0654    Ultrasound negative for any abnormality on preliminary report.Repeat evaluation shows significant tenderness right mid abdomen.  New guarding presentOndansetron 4 mg IV for nausea.CT abdomen pelvis with IV contrast 08/23 0745     Procedures    CT  Returned showing diverticulitis. Discussed findings with patient who feels that she can manage at home. Will discharge patient with prescription for ciprofloxacin and flagyl . She should schedule a follow up appointment with her primary care provider        No results found for this or any previous  visit (from the past 24 hour(s)).    Medications   ondansetron (ZOFRAN) injection 4 mg (4 mg Intravenous Given 8/23/18 0768)   sodium chloride (PF) 0.9% PF flush 60 mL (60 mLs Intravenous Given 8/23/18 0832)   iopamidol (ISOVUE-300) IV solution 61% 100 mL (100 mLs Intravenous Given 8/23/18 0831)       Assessments & Plan (with Medical Decision Making)     I have reviewed the nursing notes.    I have reviewed the findings, diagnosis, plan and need for follow up with the patient.       Discharge Medication List as of 8/23/2018  9:48 AM      START taking these medications    Details   ciprofloxacin (CIPRO) 500 MG tablet Take 1 tablet (500 mg) by mouth 2 times daily for 10 days, Disp-20 tablet, R-0, E-Prescribe      metroNIDAZOLE (FLAGYL) 500 MG tablet Take 1 tablet (500 mg) by mouth 4 times daily for 10 days, Disp-40 tablet, R-0, E-PrescribeEat yogurt or cottage cheese daily to prevent diarrhea that can be caused by taking this antibiotic.             Final diagnoses:   Right sided abdominal pain   Diverticulitis of colon       8/23/2018   [facility]     Lady Phillip MD  08/24/18 0283

## 2018-08-31 NOTE — PROGRESS NOTES
SUBJECTIVE:   Arabella Desai is a 28 year old female who presents to clinic today for the following health issues:      Abdominal Pain      Duration: couple weeks     Description (location/character/radiation): sharp pain on right side        Associated flank pain: None    Intensity:  No pain today, at its worst it is severe    Accompanying signs and symptoms:        Fever/Chills: no        Gas/Bloating: YES- bloating       Nausea/vomitting: YES- nausea       Diarrhea: YES       Dysuria or Hematuria: no     History (previous similar pain/trauma/previous testing): pt seen in ER 18    Precipitating or alleviating factors:       Pain worse with eating/BM/urination: no       Pain relieved by BM: no     Therapies tried and outcome: None    LMP:  2 weeks ago    Mom with history of diverticular rupture, partial colon resection    Patient feeling well now.  Eating well.  No fever.  No vomiting.  Having normal stools.    Taking probiotic      Problem list and histories reviewed & adjusted, as indicated.  Additional history: as documented    Current Outpatient Prescriptions   Medication     etonogestrel (IMPLANON/NEXPLANON) 68 MG IMPL     No current facility-administered medications for this visit.        Patient Active Problem List   Diagnosis     Tobacco use disorder     Need for influenza vaccination     Need for Tdap vaccination     Supervision of other normal pregnancy, antepartum     Proteinuria     History of depression      (spontaneous vaginal delivery)     Abnormal ultrasound     Increased nuchal translucency space on fetal ultrasound     Chronic seasonal allergic rhinitis due to pollen     Chronic cough     Glucose intolerance of pregnancy     Encounter for triage in pregnant patient     Normal labor and delivery     Past Surgical History:   Procedure Laterality Date     CYSTOSCOPY         Social History   Substance Use Topics     Smoking status: Current Every Day Smoker     Packs/day: 0.50     Types:  "Cigarettes     Smokeless tobacco: Never Used     Alcohol use 0.0 oz/week     0 Standard drinks or equivalent per week      Comment: occasional     Family History   Problem Relation Age of Onset     Other - See Comments Paternal Grandmother      brain aneurysm     Cancer Maternal Grandmother      lung     Diabetes Maternal Grandmother      HEART DISEASE Other 70     myocardial infarction           Reviewed and updated as needed this visit by clinical staff       Reviewed and updated as needed this visit by Provider         ROS:  CONSTITUTIONAL:NEGATIVE for fever, chills, change in weight  INTEGUMENTARY/SKIN: NEGATIVE for worrisome rashes  RESP:NEGATIVE for significant cough or SOB  CV: NEGATIVE for chest pain, palpitations or peripheral edema  GI: POSITIVE for as above and NEGATIVE for abdominal pain generalized, constipation, diarrhea, hematochezia, melena, poor appetite and vomiting  : negative for, dysuria and hematuria    OBJECTIVE:     /70 (Patient Position: Sitting)  Pulse 96  Temp 97.6  F (36.4  C) (Tympanic)  Resp 18  Ht 5' 1\" (1.549 m)  Wt 158 lb (71.7 kg)  SpO2 98%  Breastfeeding? No  BMI 29.85 kg/m2  Body mass index is 29.85 kg/(m^2).  GENERAL: healthy, alert and no distress  RESP: lungs clear to auscultation - no rales, rhonchi or wheezes  CV: regular rate and rhythm, normal S1 S2, no S3 or S4, no murmur, click or rub, no peripheral edema and peripheral pulses strong  ABDOMEN: soft, minimal tenderness RUQ, no hepatosplenomegaly, no masses and bowel sounds normal  MS: no gross musculoskeletal defects noted, no edema  PSYCH: mentation appears normal, affect normal/bright    ER records reviewed.  ASSESSMENT/PLAN:     (K57.32) Diverticulitis of colon  (primary encounter diagnosis)  Comment: resolved  Plan:   Fiber supplement advised - Citrucel, Metamucil, Fibercon, etc.  Can continue probiotic as well.    See patient instructions.        Anayeli San MD  [facility] [place]  "

## 2018-09-06 ENCOUNTER — OFFICE VISIT (OUTPATIENT)
Dept: FAMILY MEDICINE | Facility: OTHER | Age: 28
End: 2018-09-06
Attending: FAMILY MEDICINE
Payer: COMMERCIAL

## 2018-09-06 VITALS
HEART RATE: 96 BPM | WEIGHT: 158 LBS | DIASTOLIC BLOOD PRESSURE: 70 MMHG | HEIGHT: 61 IN | BODY MASS INDEX: 29.83 KG/M2 | RESPIRATION RATE: 18 BRPM | OXYGEN SATURATION: 98 % | TEMPERATURE: 97.6 F | SYSTOLIC BLOOD PRESSURE: 108 MMHG

## 2018-09-06 DIAGNOSIS — K57.32 DIVERTICULITIS OF COLON: Primary | ICD-10-CM

## 2018-09-06 PROCEDURE — 99213 OFFICE O/P EST LOW 20 MIN: CPT | Performed by: FAMILY MEDICINE

## 2018-09-06 ASSESSMENT — ANXIETY QUESTIONNAIRES
GAD7 TOTAL SCORE: 2
6. BECOMING EASILY ANNOYED OR IRRITABLE: SEVERAL DAYS
3. WORRYING TOO MUCH ABOUT DIFFERENT THINGS: NOT AT ALL
5. BEING SO RESTLESS THAT IT IS HARD TO SIT STILL: NOT AT ALL
2. NOT BEING ABLE TO STOP OR CONTROL WORRYING: NOT AT ALL
7. FEELING AFRAID AS IF SOMETHING AWFUL MIGHT HAPPEN: NOT AT ALL
4. TROUBLE RELAXING: SEVERAL DAYS
1. FEELING NERVOUS, ANXIOUS, OR ON EDGE: NOT AT ALL

## 2018-09-06 NOTE — MR AVS SNAPSHOT
After Visit Summary   9/6/2018    Arbaella Desai    MRN: [id number]           Patient Information     Date Of Birth          1990        Visit Information        Provider Department      9/6/2018 2:00 PM Anayeli Kaminski MD [facility] [place]        Today's Diagnoses     Diverticulitis of colon    -  1      Care Instructions    Fiber supplement advised - Citrucel, Metamucil, Fibercon, etc.  Can continue probiotic as well.      Discharge Instructions for Diverticulitis  You have been diagnosed with diverticulitis. This is a condition in which small pouches form in your colon (large intestine) and become inflamed or infected. Follow the guidelines below for home care.  As you recover  Tips for recovery include:    Eat a low-fiber diet. Your healthcare provider may advise a liquid diet. This gives your bowel a chance to rest so that it can recover.    Foods to include: flake cereal, mashed potatoes, pancakes, waffles, pasta, white bread, rice, applesauce, bananas, eggs, fish, poultry, tofu, and well-cooked vegetables    Take your medicines as directed. Do not stop taking the medicines, even if you feel better.    Monitor your temperature and report any rising temperature to your healthcare provider.    Take antibiotics exactly as directed. Do not miss any and keep taking them even if you feel better.     Drink 6 to 8 glasses of water every day, unless directed otherwise.    Use a heating pad or hot water bottle to reduce abdominal cramping or pain.  Preventing diverticulitis in the future  Tips for prevention include:    Eat a high-fiber diet. Fiber adds bulk to the stool so that it passes through the large intestine more easily.    Keep drinking 6 to 8 glasses of water every day, unless directed otherwise.    Begin an exercise program. Ask your healthcare provider how to get started. You can benefit from simple activities such as walking or gardening.    Treat diarrhea with a bland diet.  Start with liquids only, then slowly add fiber over time.    Watch for changes in your bowel movements (constipation to diarrhea).    Avoid constipation with fiber and add a stool softener if needed.     Get plenty of rest and sleep.  Follow-up care  Make a follow-up appointment as directed by our staff.  When to call your healthcare provider  Call your healthcare provider immediately if you have any of the following:    Fever of 100.4 F (38.0 C) or higher, or as directed by your healthcare provider    Chills    Severe cramps in the belly, most commonly the lower left side    Tenderness in the belly, most commonly the lower left side    Nausea and vomiting    Bleeding from your rectum   Date Last Reviewed: 7/1/2016 2000-2017 [employer]. [address] [place] [address], [place] [address]. All rights reserved. This information is not intended as a substitute for professional medical care. Always follow your healthcare professional's instructions.                Follow-ups after your visit        Your next 10 appointments already scheduled     Mar 13, 2019 10:00 AM CDT   (Arrive by 9:45 AM)   PHYSICAL with ANDREA Nieto CNM   [facility] ([facility] )    [address] [place] [address] [facility] [address]   [phone]              Who to contact     If you have questions or need follow up information about today's clinic visit or your schedule please contact [facility] directly at [phone].  Normal or non-critical lab and imaging results will be communicated to you by MyChart, letter or phone within 4 business days after the clinic has received the results. If you do not hear from us within 7 days, please contact the clinic through MyChart or phone. If you have a critical or abnormal lab result, we will notify you by phone as soon as possible.  Submit refill requests through [employer] or call your pharmacy and they will forward the refill request to us. Please allow 3  "business days for your refill to be completed.          Additional Information About Your Visit        MyChart Information     [employer] lets you send messages to your doctor, view your test results, renew your prescriptions, schedule appointments and more. To sign up, go to [email or web address] . Click on \"Log in\" on the left side of the screen, which will take you to the Welcome page. Then click on \"Sign up Now\" on the right side of the page.     You will be asked to enter the access code listed below, as well as some personal information. Please follow the directions to create your username and password.     Your access code is: [email or web address]  Expires: 2018  8:32 AM     Your access code will  in 90 days. If you need help or a new code, please call your [facility] clinic or [phone].        Care EveryWhere ID     This is your Care EveryWhere ID. This could be used by other organizations to access your [facility] medical records  [id number]        Your Vitals Were     Pulse Temperature Respirations Height Pulse Oximetry Breastfeeding?    96 97.6  F (36.4  C) (Tympanic) 18 5' 1\" (1.549 m) 98% No    BMI (Body Mass Index)                   29.85 kg/m2            Blood Pressure from Last 3 Encounters:   18 108/70   18 120/67   18 108/68    Weight from Last 3 Encounters:   18 158 lb (71.7 kg)   18 146 lb (66.2 kg)   18 148 lb (67.1 kg)              Today, you had the following     No orders found for display       Primary Care Provider Office Phone # Fax #    Anayeli Kaminski [phone] [address] [place] [address]        Equal Access to Services     [facility] Hospital[facility] : Humaira Manuel, waameliada shreyas, qaybta kaalmarik palacios, fe hoffman. So [facility] [phone].    ATENCIÓN: Si habla español, tiene a boston disposición servicios gratuitos de asistencia lingüística. Llame al [phone].    We comply " with applicable federal civil rights laws and [place] laws. We do not discriminate on the basis of race, color, national origin, age, disability, sex, sexual orientation, or gender identity.            Thank you!     Thank you for choosing [facility]  for your [facility]. Our goal is always to provide you with excellent care. Hearing back from our patients is one way we can continue to improve our services. Please take a few minutes to complete the written survey that you may receive in the mail after your visit with us. Thank you!             Your Updated Medication List - Protect others around you: Learn how to safely use, store and throw away your medicines at www.disposemymeds.org.          This list is accurate as of 9/6/18  2:05 PM.  Always use your most recent med list.                   Brand Name Dispense Instructions for use Diagnosis    etonogestrel 68 MG Impl    IMPLANON/NEXPLANON     1 each (68 mg) by Subdermal route continuous    Family planning

## 2018-09-06 NOTE — PATIENT INSTRUCTIONS
Fiber supplement advised - Citrucel, Metamucil, Fibercon, etc.  Can continue probiotic as well.      Discharge Instructions for Diverticulitis  You have been diagnosed with diverticulitis. This is a condition in which small pouches form in your colon (large intestine) and become inflamed or infected. Follow the guidelines below for home care.  As you recover  Tips for recovery include:    Eat a low-fiber diet. Your healthcare provider may advise a liquid diet. This gives your bowel a chance to rest so that it can recover.    Foods to include: flake cereal, mashed potatoes, pancakes, waffles, pasta, white bread, rice, applesauce, bananas, eggs, fish, poultry, tofu, and well-cooked vegetables    Take your medicines as directed. Do not stop taking the medicines, even if you feel better.    Monitor your temperature and report any rising temperature to your healthcare provider.    Take antibiotics exactly as directed. Do not miss any and keep taking them even if you feel better.     Drink 6 to 8 glasses of water every day, unless directed otherwise.    Use a heating pad or hot water bottle to reduce abdominal cramping or pain.  Preventing diverticulitis in the future  Tips for prevention include:    Eat a high-fiber diet. Fiber adds bulk to the stool so that it passes through the large intestine more easily.    Keep drinking 6 to 8 glasses of water every day, unless directed otherwise.    Begin an exercise program. Ask your healthcare provider how to get started. You can benefit from simple activities such as walking or gardening.    Treat diarrhea with a bland diet. Start with liquids only, then slowly add fiber over time.    Watch for changes in your bowel movements (constipation to diarrhea).    Avoid constipation with fiber and add a stool softener if needed.     Get plenty of rest and sleep.  Follow-up care  Make a follow-up appointment as directed by our staff.  When to call your healthcare provider  Call your  healthcare provider immediately if you have any of the following:    Fever of 100.4 F (38.0 C) or higher, or as directed by your healthcare provider    Chills    Severe cramps in the belly, most commonly the lower left side    Tenderness in the belly, most commonly the lower left side    Nausea and vomiting    Bleeding from your rectum   Date Last Reviewed: 7/1/2016 2000-2017 [employer]. [address] [place] [address], [place] [address]. All rights reserved. This information is not intended as a substitute for professional medical care. Always follow your healthcare professional's instructions.

## 2018-09-06 NOTE — NURSING NOTE
"Chief Complaint   Patient presents with     Abdominal Pain       Initial /70 (Patient Position: Sitting)  Pulse 96  Temp 97.6  F (36.4  C) (Tympanic)  Resp 18  Ht 5' 1\" (1.549 m)  Wt 158 lb (71.7 kg)  SpO2 98%  Breastfeeding? No  BMI 29.85 kg/m2 Estimated body mass index is 29.85 kg/(m^2) as calculated from the following:    Height as of this encounter: 5' 1\" (1.549 m).    Weight as of this encounter: 158 lb (71.7 kg).  Medication Reconciliation: complete    Karolyn Ingram LPN  "

## 2018-09-07 ASSESSMENT — ANXIETY QUESTIONNAIRES: GAD7 TOTAL SCORE: 2

## 2018-09-07 ASSESSMENT — PATIENT HEALTH QUESTIONNAIRE - PHQ9: SUM OF ALL RESPONSES TO PHQ QUESTIONS 1-9: 3

## 2018-09-17 ENCOUNTER — OFFICE VISIT (OUTPATIENT)
Dept: FAMILY MEDICINE | Facility: OTHER | Age: 28
End: 2018-09-17
Attending: FAMILY MEDICINE
Payer: COMMERCIAL

## 2018-09-17 VITALS
SYSTOLIC BLOOD PRESSURE: 98 MMHG | OXYGEN SATURATION: 98 % | RESPIRATION RATE: 16 BRPM | DIASTOLIC BLOOD PRESSURE: 60 MMHG | WEIGHT: 154 LBS | HEART RATE: 108 BPM | BODY MASS INDEX: 29.1 KG/M2 | TEMPERATURE: 98 F

## 2018-09-17 DIAGNOSIS — Z72.0 TOBACCO ABUSE: ICD-10-CM

## 2018-09-17 DIAGNOSIS — E87.6 HYPOKALEMIA: ICD-10-CM

## 2018-09-17 DIAGNOSIS — R10.10 PAIN OF UPPER ABDOMEN: Primary | ICD-10-CM

## 2018-09-17 DIAGNOSIS — Z71.6 TOBACCO ABUSE COUNSELING: ICD-10-CM

## 2018-09-17 LAB
ALBUMIN SERPL-MCNC: 4 G/DL (ref 3.4–5)
ALP SERPL-CCNC: 61 U/L (ref 40–150)
ALT SERPL W P-5'-P-CCNC: 19 U/L (ref 0–50)
ANION GAP SERPL CALCULATED.3IONS-SCNC: 7 MMOL/L (ref 3–14)
AST SERPL W P-5'-P-CCNC: 15 U/L (ref 0–45)
BASOPHILS # BLD AUTO: 0 10E9/L (ref 0–0.2)
BASOPHILS NFR BLD AUTO: 0.3 %
BILIRUB SERPL-MCNC: 0.3 MG/DL (ref 0.2–1.3)
BUN SERPL-MCNC: 9 MG/DL (ref 7–30)
CALCIUM SERPL-MCNC: 8.8 MG/DL (ref 8.5–10.1)
CHLORIDE SERPL-SCNC: 104 MMOL/L (ref 94–109)
CO2 SERPL-SCNC: 25 MMOL/L (ref 20–32)
CREAT SERPL-MCNC: 0.63 MG/DL (ref 0.52–1.04)
CRP SERPL-MCNC: 88.1 MG/L (ref 0–8)
DIFFERENTIAL METHOD BLD: NORMAL
EOSINOPHIL # BLD AUTO: 0.1 10E9/L (ref 0–0.7)
EOSINOPHIL NFR BLD AUTO: 1.2 %
ERYTHROCYTE [DISTWIDTH] IN BLOOD BY AUTOMATED COUNT: 12.9 % (ref 10–15)
GFR SERPL CREATININE-BSD FRML MDRD: >90 ML/MIN/1.7M2
GLUCOSE SERPL-MCNC: 112 MG/DL (ref 70–99)
HCT VFR BLD AUTO: 42.8 % (ref 35–47)
HGB BLD-MCNC: 14.9 G/DL (ref 11.7–15.7)
IMM GRANULOCYTES # BLD: 0 10E9/L (ref 0–0.4)
IMM GRANULOCYTES NFR BLD: 0.3 %
LYMPHOCYTES # BLD AUTO: 1.3 10E9/L (ref 0.8–5.3)
LYMPHOCYTES NFR BLD AUTO: 22.6 %
MCH RBC QN AUTO: 29.5 PG (ref 26.5–33)
MCHC RBC AUTO-ENTMCNC: 34.8 G/DL (ref 31.5–36.5)
MCV RBC AUTO: 85 FL (ref 78–100)
MONOCYTES # BLD AUTO: 0.5 10E9/L (ref 0–1.3)
MONOCYTES NFR BLD AUTO: 9.1 %
NEUTROPHILS # BLD AUTO: 3.9 10E9/L (ref 1.6–8.3)
NEUTROPHILS NFR BLD AUTO: 66.5 %
NRBC # BLD AUTO: 0 10*3/UL
NRBC BLD AUTO-RTO: 0 /100
PLATELET # BLD AUTO: 197 10E9/L (ref 150–450)
POTASSIUM SERPL-SCNC: 3.1 MMOL/L (ref 3.4–5.3)
PROT SERPL-MCNC: 7.9 G/DL (ref 6.8–8.8)
RBC # BLD AUTO: 5.05 10E12/L (ref 3.8–5.2)
SODIUM SERPL-SCNC: 136 MMOL/L (ref 133–144)
WBC # BLD AUTO: 5.8 10E9/L (ref 4–11)

## 2018-09-17 PROCEDURE — 86140 C-REACTIVE PROTEIN: CPT | Performed by: FAMILY MEDICINE

## 2018-09-17 PROCEDURE — 80053 COMPREHEN METABOLIC PANEL: CPT | Performed by: FAMILY MEDICINE

## 2018-09-17 PROCEDURE — 85025 COMPLETE CBC W/AUTO DIFF WBC: CPT | Performed by: FAMILY MEDICINE

## 2018-09-17 PROCEDURE — 99213 OFFICE O/P EST LOW 20 MIN: CPT | Performed by: FAMILY MEDICINE

## 2018-09-17 PROCEDURE — 36415 COLL VENOUS BLD VENIPUNCTURE: CPT | Performed by: FAMILY MEDICINE

## 2018-09-17 ASSESSMENT — ANXIETY QUESTIONNAIRES
1. FEELING NERVOUS, ANXIOUS, OR ON EDGE: NOT AT ALL
IF YOU CHECKED OFF ANY PROBLEMS ON THIS QUESTIONNAIRE, HOW DIFFICULT HAVE THESE PROBLEMS MADE IT FOR YOU TO DO YOUR WORK, TAKE CARE OF THINGS AT HOME, OR GET ALONG WITH OTHER PEOPLE: NOT DIFFICULT AT ALL
GAD7 TOTAL SCORE: 2
6. BECOMING EASILY ANNOYED OR IRRITABLE: SEVERAL DAYS
7. FEELING AFRAID AS IF SOMETHING AWFUL MIGHT HAPPEN: NOT AT ALL
5. BEING SO RESTLESS THAT IT IS HARD TO SIT STILL: NOT AT ALL
3. WORRYING TOO MUCH ABOUT DIFFERENT THINGS: NOT AT ALL
2. NOT BEING ABLE TO STOP OR CONTROL WORRYING: NOT AT ALL

## 2018-09-17 ASSESSMENT — PAIN SCALES - GENERAL: PAINLEVEL: NO PAIN (0)

## 2018-09-17 ASSESSMENT — PATIENT HEALTH QUESTIONNAIRE - PHQ9: 5. POOR APPETITE OR OVEREATING: SEVERAL DAYS

## 2018-09-17 NOTE — PROGRESS NOTES
SUBJECTIVE:   Arabella Desai is a 28 year old female who presents to clinic today for the following health issues:      Abdominal Pain    Duration: started yesterday 5am (today not as severe)    Description (location/character/radiation): upper abdominal pain       Associated flank pain: yes, body aches    Intensity:  No pain today, just upset stomach from episode yesterday    Accompanying signs and symptoms:        Fever/Chills: YES- 100.8, chills, aches  Yesterday - no fever today       Gas/Bloating: no        Nausea/vomitting: YES - onset noon yesterday       Diarrhea: YES, urgency; watery - all day yesterday       Dysuria or Hematuria: no     History (previous similar pain/trauma/previous testing): abdominal CT a month ago - diverticulitis; pain was more right sided then, ascending colon; treated with Cipro and Flagyl 18    Precipitating or alleviating factors:       Pain worse with eating/BM/urination: no       Pain relieved by BM: no     Therapies tried and outcome: None    LMP:  Started today        Problem list and histories reviewed & adjusted, as indicated.  Additional history: as documented    Current Outpatient Prescriptions   Medication     etonogestrel (IMPLANON/NEXPLANON) 68 MG IMPL     No current facility-administered medications for this visit.        Patient Active Problem List   Diagnosis     Tobacco use disorder     Need for influenza vaccination     Need for Tdap vaccination     Supervision of other normal pregnancy, antepartum     Proteinuria     History of depression      (spontaneous vaginal delivery)     Abnormal ultrasound     Increased nuchal translucency space on fetal ultrasound     Chronic seasonal allergic rhinitis due to pollen     Chronic cough     Glucose intolerance of pregnancy     Encounter for triage in pregnant patient     Normal labor and delivery     Past Surgical History:   Procedure Laterality Date     CYSTOSCOPY         Social History   Substance Use Topics      Smoking status: Current Every Day Smoker     Packs/day: 0.50     Types: Cigarettes     Smokeless tobacco: Never Used     Alcohol use 0.0 oz/week     0 Standard drinks or equivalent per week      Comment: occasional     Family History   Problem Relation Age of Onset     Other - See Comments Paternal Grandmother      brain aneurysm     Cancer Maternal Grandmother      lung     Diabetes Maternal Grandmother      HEART DISEASE Other 70     myocardial infarction           Reviewed and updated as needed this visit by clinical staff  Tobacco  Allergies  Meds  Med Hx  Surg Hx  Fam Hx  Soc Hx      Reviewed and updated as needed this visit by Provider  Allergies  Meds         ROS:  Constitutional, HEENT, cardiovascular, pulmonary, gi and gu systems are negative, except as otherwise noted.    OBJECTIVE:     BP 98/60 (BP Location: Right arm, Patient Position: Sitting, Cuff Size: Adult Regular)  Pulse 108  Temp 98  F (36.7  C) (Tympanic)  Resp 16  Wt 154 lb (69.9 kg)  LMP 09/17/2018 (Exact Date)  SpO2 98%  BMI 29.1 kg/m2  Body mass index is 29.1 kg/(m^2).  GENERAL: healthy, alert and no distress  NECK: no adenopathy, no asymmetry, masses, or scars and thyroid normal to palpation  RESP: lungs clear to auscultation - no rales, rhonchi or wheezes  CV: regular rate and rhythm, normal S1 S2, no S3 or S4, no murmur, click or rub, no peripheral edema and peripheral pulses strong  ABDOMEN: no organomegaly or masses, bowel sounds normal and soft; mild tenderness epigastric region only; no rebound or guarding  MS: no gross musculoskeletal defects noted, no edema  PSYCH: mentation appears normal, affect normal/bright    Diagnostic Test Results:  Results for orders placed or performed in visit on 09/17/18 (from the past 24 hour(s))   CBC with platelets and differential   Result Value Ref Range    WBC 5.8 4.0 - 11.0 10e9/L    RBC Count 5.05 3.8 - 5.2 10e12/L    Hemoglobin 14.9 11.7 - 15.7 g/dL    Hematocrit 42.8 35.0 - 47.0 %     MCV 85 78 - 100 fl    MCH 29.5 26.5 - 33.0 pg    MCHC 34.8 31.5 - 36.5 g/dL    RDW 12.9 10.0 - 15.0 %    Platelet Count 197 150 - 450 10e9/L    Diff Method Automated Method     % Neutrophils 66.5 %    % Lymphocytes 22.6 %    % Monocytes 9.1 %    % Eosinophils 1.2 %    % Basophils 0.3 %    % Immature Granulocytes 0.3 %    Nucleated RBCs 0 0 /100    Absolute Neutrophil 3.9 1.6 - 8.3 10e9/L    Absolute Lymphocytes 1.3 0.8 - 5.3 10e9/L    Absolute Monocytes 0.5 0.0 - 1.3 10e9/L    Absolute Eosinophils 0.1 0.0 - 0.7 10e9/L    Absolute Basophils 0.0 0.0 - 0.2 10e9/L    Abs Immature Granulocytes 0.0 0 - 0.4 10e9/L    Absolute Nucleated RBC 0.0    CRP inflammation   Result Value Ref Range    CRP Inflammation 88.1 (H) 0.0 - 8.0 mg/L   Comprehensive metabolic panel   Result Value Ref Range    Sodium 136 133 - 144 mmol/L    Potassium 3.1 (L) 3.4 - 5.3 mmol/L    Chloride 104 94 - 109 mmol/L    Carbon Dioxide 25 20 - 32 mmol/L    Anion Gap 7 3 - 14 mmol/L    Glucose 112 (H) 70 - 99 mg/dL    Urea Nitrogen 9 7 - 30 mg/dL    Creatinine 0.63 0.52 - 1.04 mg/dL    GFR Estimate >90 >60 mL/min/1.7m2    GFR Estimate If Black >90 >60 mL/min/1.7m2    Calcium 8.8 8.5 - 10.1 mg/dL    Bilirubin Total 0.3 0.2 - 1.3 mg/dL    Albumin 4.0 3.4 - 5.0 g/dL    Protein Total 7.9 6.8 - 8.8 g/dL    Alkaline Phosphatase 61 40 - 150 U/L    ALT 19 0 - 50 U/L    AST 15 0 - 45 U/L       ASSESSMENT/PLAN:     (R10.10) Pain of upper abdomen  (primary encounter diagnosis)  Comment: not typical location of diverticulitis pain; may be just stomach flu - viral gastroenteritis; is already improved significantly today; no prior CRP to compare inflammation; if stomach flu expect continued improvement over next day or 2 to resolution; if pain recurs, etc, - will repeat labs and CT; patient comfortable with plan  Plan: CBC with platelets and differential, CRP         inflammation, Comprehensive metabolic panel           (Z72.0) Tobacco abuse  Plan: Tobacco Cessation  - Order to Satisfy Health         Maintenance          (Z71.6) Tobacco abuse counseling  (R10.10) Pain of upper abdomen  (primary encounter diagnosis)    (E87.6) Hypokalemia  Comment: recent diarrhea, vomiting      See patient instructions.    Anayeli San MD  [facility]

## 2018-09-17 NOTE — PATIENT INSTRUCTIONS
Creek diet.  Banana for potassium.  Keep hydrated.  Follow up if not resolving.  May need to repeat CT.    HOW TO QUIT SMOKING  Smoking is one of the hardest habits to break. About half of all those who have ever smoked have been able to quit, and most of those (about 70%) who still smoke want to quit. Here are some of the best ways to stop smoking.     KEEP TRYING:  It takes most smokers about 8 tries before they are finally able to fully quit. So, the more often you try and fail, the better your chance of quitting the next time! So, don't give up!    GO COLD TURKEY:  Most ex-smokers quit cold turkey. Trying to cut back gradually doesn't seem to work as well, perhaps because it continues the smoking habit. Also, it is possible to fool yourself by inhaling more while smoking fewer cigarettes. This results in the same amount of nicotine in your body!    GET SUPPORT:  Support programs can make an important difference, especially for the heavy smoker. These groups offer lectures, methods to change your behavior and peer support. Call the free national Quitline for more information. 800-QUIT-NOW (253-417-4886). Low-cost or free programs are offered by many hospitals, local chapters of the American Lung Association (887-435-8583) and the American Cancer Society (798-961-1655). Support at home is important too. Non-smokers can help by offering praise and encouragement. If the smoker fails to quit, encourage them to try again!    OVER-THE-COUNTER MEDICINES:  For those who can't quit on their own, Nicotine Replacement Therapy (NRT) may make quitting much easier. Certain aids such as the nicotine patch, gum and lozenge are available without a prescription. However, it is best to use these under the guidance of your doctor. The skin patch provides a steady supply of nicotine to the body. Nicotine gum and lozenge gives temporary bursts of low levels of nicotine. Both methods take the edge off the craving for cigarettes.  WARNING: If you feel symptoms of nicotine overdose, such as nausea, vomiting, dizziness, weakness, or fast heartbeat, stop using these and see your doctor.    PRESCRIPTION MEDICINES:  After evaluating your smoking patterns and prior attempts at quitting, your doctor may offer a prescription medicine such as bupropion (Zyban, Wellbutrin), varenicline (Chantix, Champix), a niocotine inhaler or nasal spray. Each has its unique advantage and side effects which your doctor can review with you.    HEALTH BENEFITS OF QUITTING:  The benefits of quitting start right away and keep improving the longer you go without smokin minutes: blood pressure and pulse return to normal  8 hours: oxygen levels return to normal  2 days: ability to smell and taste begins to improve as damaged nerves start to regrow  2-3 weeks: circulation and lung function improves  1-9 months: decreased cough, congestion and shortness of breath; less tired  1 year: risk of heart attack decreases by half  5 years: risk of lung cancer decreases by half; risk of stroke becomes the same as a non-smoker  For information about how to quit smoking, visit the following links:  National [employer] ,   Clearing the Air, Quit Smoking Today   - an online booklet. http://www.smokefree.gov/pubs/clearing_the_air.pdf  Smokefree.gov http://smokefree.gov/  QuitNet http://www.quitnet.com/    3121-2998 [employer], [address] [place] [address]. All rights reserved. This information is not intended as a substitute for professional medical care. Always follow your healthcare professional's instructions.    The Benefits of Living Smoke Free  What do you want to gain from quitting? Check off some reasons to quit.  Health Benefits  ___ Reduce my risk of lung cancer, heart disease, chronic lung disease  ___ Have fewer wrinkles and softer skin  ___ Improve my sense of taste and smell  ___ For pregnant women--reduce the risk of having a miscarriage,  stillbirth, premature birth, or low-birth-weight baby  Personal Benefits  ___ Feel more in control of my life  ___ Have better-smelling hair, breath, clothes, home, and car  ___ Save time by not having to take smoke breaks, buy cigarettes, or hunt for a light  ___ Have whiter teeth  Family Benefits  ___ Reduce my children s respiratory tract infections  ___ Set a good example for my children  ___ Reduce my family s cancer risk  Financial Benefits  ___ Save hundreds of dollars each year that would be spent on cigarettes  ___ Save money on medical bills  ___ Save on life, health, and car insurance premiums    Those Dollars Add Up!  Cigarettes are expensive, and getting more expensive all the time. Do you realize how much money you are spending on cigarettes per year? What is the average amount you spend on a pack of cigarettes? What is the average number of packs that you smoke per day? Using your answers to these questions, fill in this formula to help you find out:  ($ _____ per pack) ×  ( _____ number of packs per day) × (365 days) =  $ _____ yearly cost of smoking  Besides tobacco, there are other costs, including extra cleaning bills and replacement costs for clothing and furniture; medical expenses for smoking-related illnesses; and higher health, life, and car insurance premiums.    Cigars and Pipes Count Too!  Cigars and pipes are also dangerous. So are smokeless (chewing) tobacco and snuff. All of these products contain nicotine, a highly addictive substance that has harmful effects on your body. Quitting smoking means giving up all tobacco products.      4094-8030 [facility], [address] [place] [address], [place] [address]. All rights reserved. This information is not intended as a substitute for professional medical care. Always follow your healthcare professional's instructions.

## 2018-09-17 NOTE — NURSING NOTE
"Chief Complaint   Patient presents with     Abdominal Pain     diverticulitis flair       Initial BP 98/60 (BP Location: Right arm, Patient Position: Sitting, Cuff Size: Adult Regular)  Pulse 108  Temp 98  F (36.7  C) (Tympanic)  Resp 16  Wt 154 lb (69.9 kg)  LMP 09/17/2018 (Exact Date)  SpO2 98%  BMI 29.1 kg/m2 Estimated body mass index is 29.1 kg/(m^2) as calculated from the following:    Height as of 9/6/18: 5' 1\" (1.549 m).    Weight as of this encounter: 154 lb (69.9 kg).  Medication Reconciliation: complete    Kaylee Kinsey LPN  "

## 2018-09-17 NOTE — MR AVS SNAPSHOT
After Visit Summary   9/17/2018    Arabella Desai    MRN: [id number]           Patient Information     Date Of Birth          1990        Visit Information        Provider Department      9/17/2018 2:15 PM Anayeli Kaminski MD [facility]        Today's Diagnoses     Pain of upper abdomen    -  1    Tobacco abuse        Tobacco abuse counseling        Hypokalemia          Care Instructions    [place]berry diet.  Banana for potassium.  Keep hydrated.  Follow up if not resolving.  May need to repeat CT.    HOW TO QUIT SMOKING  Smoking is one of the hardest habits to break. About half of all those who have ever smoked have been able to quit, and most of those (about 70%) who still smoke want to quit. Here are some of the best ways to stop smoking.     KEEP TRYING:  It takes most smokers about 8 tries before they are finally able to fully quit. So, the more often you try and fail, the better your chance of quitting the next time! So, don't give up!    GO COLD TURKEY:  Most ex-smokers quit cold turkey. Trying to cut back gradually doesn't seem to work as well, perhaps because it continues the smoking habit. Also, it is possible to fool yourself by inhaling more while smoking fewer cigarettes. This results in the same amount of nicotine in your body!    GET SUPPORT:  Support programs can make an important difference, especially for the heavy smoker. These groups offer lectures, methods to change your behavior and peer support. Call the free national Quitline for more information. 800-QUIT-NOW (915-384-7847). Low-cost or free programs are offered by many hospitals, local chapters of the American Lung Association (886-583-5571) and the American Cancer Society (793-876-1510). Support at home is important too. Non-smokers can help by offering praise and encouragement. If the smoker fails to quit, encourage them to try again!    OVER-THE-COUNTER MEDICINES:  For those who can't quit on  their own, Nicotine Replacement Therapy (NRT) may make quitting much easier. Certain aids such as the nicotine patch, gum and lozenge are available without a prescription. However, it is best to use these under the guidance of your doctor. The skin patch provides a steady supply of nicotine to the body. Nicotine gum and lozenge gives temporary bursts of low levels of nicotine. Both methods take the edge off the craving for cigarettes. WARNING: If you feel symptoms of nicotine overdose, such as nausea, vomiting, dizziness, weakness, or fast heartbeat, stop using these and see your doctor.    PRESCRIPTION MEDICINES:  After evaluating your smoking patterns and prior attempts at quitting, your doctor may offer a prescription medicine such as bupropion (Zyban, Wellbutrin), varenicline (Chantix, Champix), a niocotine inhaler or nasal spray. Each has its unique advantage and side effects which your doctor can review with you.    HEALTH BENEFITS OF QUITTING:  The benefits of quitting start right away and keep improving the longer you go without smokin minutes: blood pressure and pulse return to normal  8 hours: oxygen levels return to normal  2 days: ability to smell and taste begins to improve as damaged nerves start to regrow  2-3 weeks: circulation and lung function improves  1-9 months: decreased cough, congestion and shortness of breath; less tired  1 year: risk of heart attack decreases by half  5 years: risk of lung cancer decreases by half; risk of stroke becomes the same as a non-smoker  For information about how to quit smoking, visit the following links:  National Cancer [place] ,   Clearing the Air, Quit Smoking Today   - an online booklet. http://www.smokefree.gov/pubs/clearing_the_air.pdf  Smokefree.gov http://smokefree.gov/  QuitNet http://www.quitnet.com/    1657-7507 Alexander Zavala, [address] [facility], [place] [address]. All rights reserved. This information is not intended as a substitute for  professional medical care. Always follow your healthcare professional's instructions.    The Benefits of Living Smoke Free  What do you want to gain from quitting? Check off some reasons to quit.  Health Benefits  ___ Reduce my risk of lung cancer, heart disease, chronic lung disease  ___ Have fewer wrinkles and softer skin  ___ Improve my sense of taste and smell  ___ For pregnant women--reduce the risk of having a miscarriage, stillbirth, premature birth, or low-birth-weight baby  Personal Benefits  ___ Feel more in control of my life  ___ Have better-smelling hair, breath, clothes, home, and car  ___ Save time by not having to take smoke breaks, buy cigarettes, or hunt for a light  ___ Have whiter teeth  Family Benefits  ___ Reduce my children s respiratory tract infections  ___ Set a good example for my children  ___ Reduce my family s cancer risk  Financial Benefits  ___ Save hundreds of dollars each year that would be spent on cigarettes  ___ Save money on medical bills  ___ Save on life, health, and car insurance premiums    Those Dollars Add Up!  Cigarettes are expensive, and getting more expensive all the time. Do you realize how much money you are spending on cigarettes per year? What is the average amount you spend on a pack of cigarettes? What is the average number of packs that you smoke per day? Using your answers to these questions, fill in this formula to help you find out:  ($ _____ per pack) ×  ( _____ number of packs per day) × (365 days) =  $ _____ yearly cost of smoking  Besides tobacco, there are other costs, including extra cleaning bills and replacement costs for clothing and furniture; medical expenses for smoking-related illnesses; and higher health, life, and car insurance premiums.    Cigars and Pipes Count Too!  Cigars and pipes are also dangerous. So are smokeless (chewing) tobacco and snuff. All of these products contain nicotine, a highly addictive substance that has harmful effects  "on your body. Quitting smoking means giving up all tobacco products.      8438-4621 Alexander Zavala, [address] [place] [address], [place] [address]. All rights reserved. This information is not intended as a substitute for professional medical care. Always follow your healthcare professional's instructions.          Follow-ups after your visit        Your next 10 appointments already scheduled     Mar 13, 2019 10:00 AM CDT   (Arrive by 9:45 AM)   PHYSICAL with ANDREA Nieto CNM   [facility]    [address]  [place] [address]   [phone]              Who to contact     If you have questions or need follow up information about today's clinic visit or your schedule please contact [facility] directly at [phone].  Normal or non-critical lab and imaging results will be communicated to you by MyChart, letter or phone within 4 business days after the clinic has received the results. If you do not hear from us within 7 days, please contact the clinic through MyChart or phone. If you have a critical or abnormal lab result, we will notify you by phone as soon as possible.  Submit refill requests through [employer] or call your pharmacy and they will forward the refill request to us. Please allow 3 business days for your refill to be completed.          Additional Information About Your Visit        MyChart Information     [employer] lets you send messages to your doctor, view your test results, renew your prescriptions, schedule appointments and more. To sign up, go to [email or web address] . Click on \"Log in\" on the left side of the screen, which will take you to the Welcome page. Then click on \"Sign up Now\" on the right side of the page.     You will be asked to enter the access code listed below, as well as some personal information. Please follow the directions to create your username and password.     Your access code is: " [email or web address]  Expires: 2018  8:32 AM     Your access code will  in 90 days. If you need help or a new code, please call your [place] clinic or [phone].        Care EveryWhere ID     This is your Care EveryWhere ID. This could be used by other organizations to access your [place] medical records  [id number]        Your Vitals Were     Pulse Temperature Respirations Last Period Pulse Oximetry BMI (Body Mass Index)    108 98  F (36.7  C) (Tympanic) 16 2018 (Exact Date) 98% 29.1 kg/m2       Blood Pressure from Last 3 Encounters:   18 98/60   18 108/70   18 120/67    Weight from Last 3 Encounters:   18 154 lb (69.9 kg)   18 158 lb (71.7 kg)   18 146 lb (66.2 kg)              We Performed the Following     CBC with platelets and differential     Comprehensive metabolic panel     CRP inflammation     Tobacco Cessation - Order to Satisfy Health Maintenance        Primary Care Provider Office Phone # Fax #    Anayeli Kaminski [phone] [address] [facility] [address]        Equal Access to Services     NIGHAT SOMMERS : Hadii aad ku hadasho Soomaali, waaxda luqadaha, qaybta kaalmada adeegyada, fe briggs hayolgan marguerite masters . So [facility] [phone].    ATENCIÓN: Si habla español, tiene a boston disposición servicios gratuitos de asistencia lingüística. Llame al [phone].    We comply with applicable federal civil rights laws and [place] laws. We do not discriminate on the basis of race, color, national origin, age, disability, sex, sexual orientation, or gender identity.            Thank you!     Thank you for choosing [facility]  for your care. Our goal is always to provide you with excellent care. Hearing back from our patients is one way we can continue to improve our services. Please take a few minutes to complete the written survey that you may receive in the mail after your visit with us. Thank  you!             Your Updated Medication List - Protect others around you: Learn how to safely use, store and throw away your medicines at www.disposemymeds.org.          This list is accurate as of 9/17/18  3:28 PM.  Always use your most recent med list.                   Brand Name Dispense Instructions for use Diagnosis    etonogestrel 68 MG Impl    IMPLANON/NEXPLANON     1 each (68 mg) by Subdermal route continuous    Family planning

## 2018-09-18 ASSESSMENT — PATIENT HEALTH QUESTIONNAIRE - PHQ9: SUM OF ALL RESPONSES TO PHQ QUESTIONS 1-9: 3

## 2018-09-18 ASSESSMENT — ANXIETY QUESTIONNAIRES: GAD7 TOTAL SCORE: 2

## 2019-03-13 ENCOUNTER — OFFICE VISIT (OUTPATIENT)
Dept: OBGYN | Facility: OTHER | Age: 29
End: 2019-03-13
Attending: ADVANCED PRACTICE MIDWIFE
Payer: COMMERCIAL

## 2019-03-13 VITALS
DIASTOLIC BLOOD PRESSURE: 76 MMHG | SYSTOLIC BLOOD PRESSURE: 106 MMHG | BODY MASS INDEX: 31.53 KG/M2 | WEIGHT: 167 LBS | HEIGHT: 61 IN | HEART RATE: 60 BPM

## 2019-03-13 DIAGNOSIS — Z12.4 ENCOUNTER FOR SCREENING FOR CERVICAL CANCER: ICD-10-CM

## 2019-03-13 DIAGNOSIS — Z01.419 WELL WOMAN EXAM WITH ROUTINE GYNECOLOGICAL EXAM: Primary | ICD-10-CM

## 2019-03-13 DIAGNOSIS — N81.89 PELVIC FLOOR WEAKNESS: ICD-10-CM

## 2019-03-13 PROBLEM — Z36.89 ENCOUNTER FOR TRIAGE IN PREGNANT PATIENT: Status: RESOLVED | Noted: 2017-12-21 | Resolved: 2019-03-13

## 2019-03-13 PROBLEM — R93.89 ABNORMAL ULTRASOUND: Status: RESOLVED | Noted: 2017-07-25 | Resolved: 2019-03-13

## 2019-03-13 PROBLEM — O28.3 INCREASED NUCHAL TRANSLUCENCY SPACE ON FETAL ULTRASOUND: Status: RESOLVED | Noted: 2017-07-28 | Resolved: 2019-03-13

## 2019-03-13 PROCEDURE — 88142 CYTOPATH C/V THIN LAYER: CPT | Performed by: ADVANCED PRACTICE MIDWIFE

## 2019-03-13 PROCEDURE — 99385 PREV VISIT NEW AGE 18-39: CPT | Performed by: ADVANCED PRACTICE MIDWIFE

## 2019-03-13 ASSESSMENT — ANXIETY QUESTIONNAIRES
1. FEELING NERVOUS, ANXIOUS, OR ON EDGE: SEVERAL DAYS
IF YOU CHECKED OFF ANY PROBLEMS ON THIS QUESTIONNAIRE, HOW DIFFICULT HAVE THESE PROBLEMS MADE IT FOR YOU TO DO YOUR WORK, TAKE CARE OF THINGS AT HOME, OR GET ALONG WITH OTHER PEOPLE: NOT DIFFICULT AT ALL
4. TROUBLE RELAXING: NOT AT ALL
GAD7 TOTAL SCORE: 2
3. WORRYING TOO MUCH ABOUT DIFFERENT THINGS: NOT AT ALL
7. FEELING AFRAID AS IF SOMETHING AWFUL MIGHT HAPPEN: NOT AT ALL
5. BEING SO RESTLESS THAT IT IS HARD TO SIT STILL: NOT AT ALL
2. NOT BEING ABLE TO STOP OR CONTROL WORRYING: NOT AT ALL
6. BECOMING EASILY ANNOYED OR IRRITABLE: SEVERAL DAYS

## 2019-03-13 ASSESSMENT — PATIENT HEALTH QUESTIONNAIRE - PHQ9: SUM OF ALL RESPONSES TO PHQ QUESTIONS 1-9: 2

## 2019-03-13 ASSESSMENT — MIFFLIN-ST. JEOR: SCORE: 1419.89

## 2019-03-13 ASSESSMENT — PAIN SCALES - GENERAL: PAINLEVEL: NO PAIN (0)

## 2019-03-13 NOTE — NURSING NOTE
"Chief Complaint   Patient presents with     Gyn Exam       Initial /76   Pulse 60   Ht 1.549 m (5' 1\")   Wt 75.8 kg (167 lb)   LMP 02/15/2019   BMI 31.55 kg/m   Estimated body mass index is 31.55 kg/m  as calculated from the following:    Height as of this encounter: 1.549 m (5' 1\").    Weight as of this encounter: 75.8 kg (167 lb).  Medication Reconciliation: complete    Paty Matthews LPN    "

## 2019-03-13 NOTE — PATIENT INSTRUCTIONS
Return to office in 1 year for well woman care and as needed.    Thank you for allowing Jamison MENDEZ CNM and our OB team to participate in your care.  If you have a scheduling or an appointment question please contact [facility] Unit Coordinator at their direct line [phone].   ALL nursing questions or concerns can be directed to your OB nurse at: [phone] Destiny Muse/Paty

## 2019-03-13 NOTE — PROGRESS NOTES
LEONIDAS Bell is a 29 year old  female who presents for annual exam.   Youngest child 1  Largest Child 7 lb 1 oz  GDM glucose intolerance  Hypertension  n  Patient's last menstrual period was 02/15/2019.  Menses:  Cycles Irregular with Nexplanon When did they start 13 How many days bleed 10-20 days of light flow pain n Contraception Nexplanon.  Planning pregnancy: n  Concerns in addition to routine health maintenance?  n.  GYNECOLOGIC HISTORY:   Surgery: n  History sexually transmitted infections:  No STD   Safe?  y  STI testing offered?  y  History of abnormal Pap smear: n  Problems with sex? (bleeding/pain) n  Family history of: breast cancer: n   Uterine cancer: n ovarian cancer: n   colon cancer: n    HEALTH MAINTENANCE:  Cholesterol: (No results found for: CHOL History of abnormal lipids: n  Mammo: n . History of abnormal Mammo:na   Regular Self Breast Exams: n Calcium/Vitamin D or Vitamin: n Exercise n    TSH: (  TSH   Date Value Ref Range Status   2015 1.58 0.40 - 4.00 mU/L Final     Comment:     Effective 2014, the reference range for this assay has changed to reflect   new instrumentation/methodology.      )  Pap; (  Lab Results   Component Value Date    PAP NIL 2017    PAP NIL 2015    )    HISTORY:  Obstetric History       T3      L3     SAB0   TAB0   Ectopic0   Multiple0   Live Births3       # Outcome Date GA Lbr Marcel/2nd Weight Sex Delivery Anes PTL Lv   3 Term 18 38w4d 12:44 / 00:02 2.6 kg (5 lb 11.7 oz) M Vag-Spont IV REGIONAL, Nitrous N PRISCILLA      Name: Roger      Apgar1:  9                Apgar5: 9   2 Term 16 38w4d 02:30 / 00:16 2.821 kg (6 lb 3.5 oz) F Vag-Spont INT, TOPICAL N PRISCILLA      Name: Laly      Apgar1:  9               Apgar5: 10   1 Term 12 40w0d  3.204 kg (7 lb 1 oz) F Vag-Spont   PRISCILLA      Name: Leena        Past Medical History:   Diagnosis Date     Chronic interstitial cystitis 2011     Tobacco use disorder 2012      Past Surgical History:   Procedure Laterality Date     CYSTOSCOPY       Family History   Problem Relation Age of Onset     Other - See Comments Paternal Grandmother         brain aneurysm     Cancer Maternal Grandmother         lung     Diabetes Maternal Grandmother      Heart Disease Other 70        myocardial infarction     Social History     Socioeconomic History     Marital status:      Spouse name: None     Number of children: None     Years of education: None     Highest education level: None   Occupational History     Occupation: [employer]   Social Needs     Financial resource strain: None     Food insecurity:     Worry: None     Inability: None     Transportation needs:     Medical: None     Non-medical: None   Tobacco Use     Smoking status: Current Every Day Smoker     Packs/day: 0.50     Types: Cigarettes     Smokeless tobacco: Never Used   Substance and Sexual Activity     Alcohol use: Yes     Alcohol/week: 0.0 oz     Comment: occasional     Drug use: No     Sexual activity: Yes     Partners: Male   Lifestyle     Physical activity:     Days per week: None     Minutes per session: None     Stress: None   Relationships     Social connections:     Talks on phone: None     Gets together: None     Attends Druze service: None     Active member of club or organization: None     Attends meetings of clubs or organizations: None     Relationship status: None     Intimate partner violence:     Fear of current or ex partner: None     Emotionally abused: None     Physically abused: None     Forced sexual activity: None   Other Topics Concern      Service No     Blood Transfusions Yes     Comment: Permits if needed     Caffeine Concern No     Occupational Exposure No     Hobby Hazards No     Sleep Concern No     Stress Concern No     Weight Concern No     Special Diet No     Back Care No     Exercise No     Bike Helmet Not Asked     Seat Belt Yes     Self-Exams Yes     Parent/sibling w/ CABG,  "MI or angioplasty before 65F 55M? No   Social History Narrative     None       Current Outpatient Medications:      etonogestrel (IMPLANON/NEXPLANON) 68 MG IMPL, 1 each (68 mg) by Subdermal route continuous, Disp: , Rfl: 0     Allergies   Allergen Reactions     Latex Rash       Past medical, surgical, social and family history were reviewed and updated in [facility].    ROS:    CONSTITUTIONAL:     NEGATIVE for fever, chills, change in weight  INTEGUMENTARY/SKIN:       NEGATIVE for worrisome rashes, moles or lesions  EYES:     NEGATIVE for vision changes or irritation  ENT/MOUTH: NEGATIVE for ear, mouth and throat problems  RESP:     NEGATIVE for significant cough or SOB  CV:   NEGATIVE for chest pain, palpitations or peripheral edema  GI:     NEGATIVE for nausea, abdominal pain, heartburn, or change in bowel habits  :   NEGATIVE for frequency, dysuria, hematuria, vaginal discharge, incontinence.  Irregular bleeding with Nexplanon  MUSCULOSKELETAL:     NEGATIVE for significant arthralgias or myalgia  NEURO:      NEGATIVE for weakness, dizziness or paresthesias  ENDOCRINE:      NEGATIVE for temperature intolerance, skin/hair changes  PSYCHIATRIC:      NEGATIVE for changes in mood or affect.     EXAM:   /76   Pulse 60   Ht 1.549 m (5' 1\")   Wt 75.8 kg (167 lb)   LMP 02/15/2019   BMI 31.55 kg/m     BMI: Body mass index is 31.55 kg/m .  Constitutional: healthy, alert and no distress  Head: Normocephalic. No masses, lesions, tenderness or abnormalities  Neck: Neck supple. Trachea midline. No adenopathy. Thyroid symmetric, normal size.   Cardiovascular: RRR.   Respiratory: lungs clear   Breast: Breasts reveal mild symmetric fibrocystic densities, but there are no dominant, discrete, fixed or suspicious masses found.  Gastrointestinal: Abdomen soft, non-tender, non-distended. No masses, organomegaly.  :  Vulva:  No external lesions, normal female hair distribution, no inguinal adenopathy.    Urethra:  Midline, " non-tender, well supported, no discharge  Vagina:  Moist, pink, no abnormal discharge, no lesions  Cervix: clean   Uterus:  Normal size, non-tender, freely mobile  Ovaries:  No masses appreciated  Rectal Exam: deferred  Musculoskeletal: extremities normal  Skin: no suspicious lesions or rashes  Psychiatric: Affect appropriate, cooperative,mentation appears normal.     COUNSELING:   regular exercise  healthy diet/nutrition  Immunizations   contraception  family planning  Folic Acid Counseling   reports that she has been smoking cigarettes.  She has been smoking about 0.50 packs per day. she has never used smokeless tobacco.  Tobacco Cessation Action Plan: Declines counseling today  Body mass index is 31.55 kg/m .  Weight management plan: Eat healthy and initiate weight bearing/strength exercise  FRAX Risk Assessment    ASSESSMENT:  29 year old female with satisfactory annual exam  Need of cervical cancer screening  Irregular bleeding with Nexplanon  Pelvic weakness/possible prolapse  Smoker  Declines flu shot  PLAN:   Pap with High Risk hpv  Ibuprophen for irregular bleeding.  Initiate at the first sign of bleedin mg every 8 hours for 2-3 days  PT referral pelvic strengthening  Declines tobacco cessation counseling    Return to office: 1 year for well woman care and as needed    Total visit greater than 30 minutes with 25 minutes spent discussing well woman care and prevention.    Jamison Cuellar, ANDREA, CNM

## 2019-03-14 ASSESSMENT — ANXIETY QUESTIONNAIRES: GAD7 TOTAL SCORE: 2

## 2019-03-19 LAB
COPATH REPORT: NORMAL
PAP: NORMAL

## 2019-04-30 ENCOUNTER — OFFICE VISIT (OUTPATIENT)
Dept: FAMILY MEDICINE | Facility: OTHER | Age: 29
End: 2019-04-30
Attending: FAMILY MEDICINE
Payer: COMMERCIAL

## 2019-04-30 VITALS
BODY MASS INDEX: 31.72 KG/M2 | RESPIRATION RATE: 16 BRPM | DIASTOLIC BLOOD PRESSURE: 68 MMHG | WEIGHT: 168 LBS | HEART RATE: 88 BPM | TEMPERATURE: 98.8 F | OXYGEN SATURATION: 99 % | HEIGHT: 61 IN | SYSTOLIC BLOOD PRESSURE: 118 MMHG

## 2019-04-30 DIAGNOSIS — K57.32 DIVERTICULITIS OF COLON: Primary | ICD-10-CM

## 2019-04-30 PROCEDURE — 99214 OFFICE O/P EST MOD 30 MIN: CPT | Performed by: FAMILY MEDICINE

## 2019-04-30 RX ORDER — CIPROFLOXACIN 500 MG/1
500 TABLET, FILM COATED ORAL 2 TIMES DAILY
Qty: 20 TABLET | Refills: 0 | Status: SHIPPED | OUTPATIENT
Start: 2019-04-30 | End: 2019-06-24

## 2019-04-30 RX ORDER — METRONIDAZOLE 500 MG/1
500 TABLET ORAL 2 TIMES DAILY
Qty: 20 TABLET | Refills: 0 | Status: SHIPPED | OUTPATIENT
Start: 2019-04-30 | End: 2019-06-24

## 2019-04-30 ASSESSMENT — MIFFLIN-ST. JEOR: SCORE: 1424.42

## 2019-04-30 ASSESSMENT — PAIN SCALES - GENERAL: PAINLEVEL: MILD PAIN (3)

## 2019-04-30 NOTE — NURSING NOTE
"Chief Complaint   Patient presents with     Abdominal Pain       Initial /68 (BP Location: Left arm, Patient Position: Sitting, Cuff Size: Adult Regular)   Pulse 88   Temp 98.8  F (37.1  C)   Resp 16   Ht 1.549 m (5' 1\")   Wt 76.2 kg (168 lb)   SpO2 99%   BMI 31.74 kg/m   Estimated body mass index is 31.74 kg/m  as calculated from the following:    Height as of this encounter: 1.549 m (5' 1\").    Weight as of this encounter: 76.2 kg (168 lb).  Medication Reconciliation: complete    Loli Bills LPN  "

## 2019-04-30 NOTE — PROGRESS NOTES
SUBJECTIVE:   Arabella Desai is a 29 year old female who presents to clinic today for the following   health issues:      Abdominal Pain      Duration: 2 days    Description (location/character/radiation): non localized intermittent abdominal pain       Associated flank pain: None    Intensity:  6/10    Accompanying signs and symptoms:        Fever/Chills: YES       Gas/Bloating: YES       Nausea/vomitting: no        Diarrhea: no        Dysuria or Hematuria: no     History (previous similar pain/trauma/previous testing): history or diverticulitis    Precipitating or alleviating factors:       Pain worse with eating/BM/urination: yes with eating       Pain relieved by BM: no     Therapies tried and outcome: None    Additional history: as documented    Reviewed  and updated as needed this visit by clinical staff  Tobacco  Allergies  Meds  Problems  Med Hx  Surg Hx  Fam Hx         Reviewed and updated as needed this visit by Provider         Patient Active Problem List   Diagnosis     Tobacco use disorder     Need for influenza vaccination     Supervision of other normal pregnancy, antepartum     Proteinuria     History of depression      (spontaneous vaginal delivery)     Well woman exam with routine gynecological exam     Chronic seasonal allergic rhinitis due to pollen     Chronic cough     Glucose intolerance of pregnancy     Past Surgical History:   Procedure Laterality Date     CYSTOSCOPY         Social History     Tobacco Use     Smoking status: Current Every Day Smoker     Packs/day: 0.50     Types: Cigarettes     Smokeless tobacco: Never Used   Substance Use Topics     Alcohol use: Yes     Alcohol/week: 0.0 oz     Comment: occasional     Family History   Problem Relation Age of Onset     Other - See Comments Paternal Grandmother         brain aneurysm     Cancer Maternal Grandmother         lung     Diabetes Maternal Grandmother      Heart Disease Other 70        myocardial infarction      "    Current Outpatient Medications   Medication Sig Dispense Refill     etonogestrel (IMPLANON/NEXPLANON) 68 MG IMPL 1 each (68 mg) by Subdermal route continuous  0     Allergies   Allergen Reactions     Latex Rash     BP Readings from Last 3 Encounters:   04/30/19 118/68   03/13/19 106/76   09/17/18 98/60    Wt Readings from Last 3 Encounters:   04/30/19 76.2 kg (168 lb)   03/13/19 75.8 kg (167 lb)   09/17/18 69.9 kg (154 lb)                    ROS:  Constitutional, HEENT, cardiovascular, pulmonary, gi and gu systems are negative, except as otherwise noted.    OBJECTIVE:     /68 (BP Location: Left arm, Patient Position: Sitting, Cuff Size: Adult Regular)   Pulse 88   Temp 98.8  F (37.1  C)   Resp 16   Ht 1.549 m (5' 1\")   Wt 76.2 kg (168 lb)   SpO2 99%   BMI 31.74 kg/m    Body mass index is 31.74 kg/m .  Physical Exam   Constitutional: She is oriented to person, place, and time. She appears well-developed and well-nourished. No distress.   Abdominal: Soft. Normal appearance and bowel sounds are normal. There is no hepatosplenomegaly. There is tenderness in the left upper quadrant. There is no CVA tenderness. No hernia.   Neurological: She is alert and oriented to person, place, and time.   Psychiatric: She has a normal mood and affect.     Other exam not repeated    Diagnostic Test Results:  Results for orders placed or performed in visit on 03/13/19   A pap thin layer screen reflex to HPV if ASCUS - recommend age 25 - 29   Result Value Ref Range    PAP NIL     Copath Report         Patient Name: KELL KOLB  MR#: [id number]  Specimen #: [id number]  Collected: 3/13/2019  Received: 3/15/2019  Reported: 3/19/2019 10:31  Ordering Phy(s): RAMO SANDERS    For improved result formatting, select 'View Enhanced Report Format' under   Linked Documents section.    SPECIMEN/STAIN PROCESS:  Pap thin layer prep screening ([facility])       Pap-Cyto x 1, Pap with reflex to HPV if ASCUS x 1    SOURCE: Cervical, " endocervical  ----------------------------------------------------------------   Pap thin layer prep screening ([facility])  SPECIMEN ADEQUACY:  Satisfactory for evaluation.  -Transformation zone component present.    CYTOLOGIC INTERPRETATION:    Negative for intraepithelial lesion or malignancy         Organism(s):  -Fungal organisms morphologically consistent with Candida spp.  -Marked inflammation present.      - Reactive endocervical cells present.    Electronically signed out by:  SIL Ward ( ASCP)    Processed at [facility], [facility], screened at   [facility]    CLINICAL HISTORY:  LMP: 2/15/2019  A previous normal pap  Date of Last Pap: 6/20/2017,    Papanicolaou Test Limitations:  Cervical cytology is a screening test with   limited sensitivity; regular  screening is critical for cancer prevention; Pap tests are primarily   effective for the diagnosis/prevention of  squamous cell carcinoma, not adenocarcinomas or other cancers.    TESTING LAB LOCATION:  [address] [address]  [phone]    COLLECTION SITE:  Client:  [facility]  Location: [facility] ([facility])         ASSESSMENT/PLAN:     Diverticulitis of colon  Previous CT reviewed.  Begin metronidazole and ciprofloxacin as written.  If symptoms persist recommend repeat imaging and surgical consultation  - metroNIDAZOLE (FLAGYL) 500 MG tablet; Take 1 tablet (500 mg) by mouth 2 times daily  - ciprofloxacin (CIPRO) 500 MG tablet; Take 1 tablet (500 mg) by mouth 2 times daily      Rashawn Espitia MD  [facility]

## 2019-05-17 ENCOUNTER — TELEPHONE (OUTPATIENT)
Dept: FAMILY MEDICINE | Facility: OTHER | Age: 29
End: 2019-05-17

## 2019-05-17 DIAGNOSIS — B37.0 THRUSH: Primary | ICD-10-CM

## 2019-05-17 RX ORDER — NYSTATIN 100000/ML
500000 SUSPENSION, ORAL (FINAL DOSE FORM) ORAL 4 TIMES DAILY
Qty: 60 ML | Refills: 1 | Status: SHIPPED | OUTPATIENT
Start: 2019-05-17 | End: 2019-06-24

## 2019-05-17 NOTE — TELEPHONE ENCOUNTER
Patient phoned stating she was treated on 4/30 for diverticulitis with Cipro and now she is pretty sure she has thrush.  Requesting provider be notified and requesting something for the thrush.  States she completely understands if she needs to be seen first but would like to be notified either way.    [phone]

## 2019-06-24 ENCOUNTER — OFFICE VISIT (OUTPATIENT)
Dept: FAMILY MEDICINE | Facility: OTHER | Age: 29
End: 2019-06-24
Attending: FAMILY MEDICINE
Payer: COMMERCIAL

## 2019-06-24 ENCOUNTER — ANCILLARY PROCEDURE (OUTPATIENT)
Dept: GENERAL RADIOLOGY | Facility: OTHER | Age: 29
End: 2019-06-24
Attending: FAMILY MEDICINE
Payer: COMMERCIAL

## 2019-06-24 VITALS
OXYGEN SATURATION: 98 % | TEMPERATURE: 98.7 F | WEIGHT: 167.6 LBS | DIASTOLIC BLOOD PRESSURE: 64 MMHG | RESPIRATION RATE: 20 BRPM | BODY MASS INDEX: 31.67 KG/M2 | HEART RATE: 97 BPM | SYSTOLIC BLOOD PRESSURE: 116 MMHG

## 2019-06-24 DIAGNOSIS — K29.00 OTHER ACUTE GASTRITIS WITHOUT HEMORRHAGE: ICD-10-CM

## 2019-06-24 DIAGNOSIS — R10.13 ABDOMINAL PAIN, EPIGASTRIC: Primary | ICD-10-CM

## 2019-06-24 DIAGNOSIS — R10.13 ABDOMINAL PAIN, EPIGASTRIC: ICD-10-CM

## 2019-06-24 DIAGNOSIS — K59.01 SLOW TRANSIT CONSTIPATION: ICD-10-CM

## 2019-06-24 LAB
ALBUMIN SERPL-MCNC: 4.1 G/DL (ref 3.4–5)
ALP SERPL-CCNC: 81 U/L (ref 40–150)
ALT SERPL W P-5'-P-CCNC: 21 U/L (ref 0–50)
ANION GAP SERPL CALCULATED.3IONS-SCNC: 5 MMOL/L (ref 3–14)
AST SERPL W P-5'-P-CCNC: 10 U/L (ref 0–45)
BASOPHILS # BLD AUTO: 0.1 10E9/L (ref 0–0.2)
BASOPHILS NFR BLD AUTO: 1 %
BILIRUB SERPL-MCNC: 0.4 MG/DL (ref 0.2–1.3)
BUN SERPL-MCNC: 9 MG/DL (ref 7–30)
CALCIUM SERPL-MCNC: 9 MG/DL (ref 8.5–10.1)
CHLORIDE SERPL-SCNC: 109 MMOL/L (ref 94–109)
CO2 SERPL-SCNC: 24 MMOL/L (ref 20–32)
CREAT SERPL-MCNC: 0.64 MG/DL (ref 0.52–1.04)
DIFFERENTIAL METHOD BLD: NORMAL
EOSINOPHIL # BLD AUTO: 0.1 10E9/L (ref 0–0.7)
EOSINOPHIL NFR BLD AUTO: 1.8 %
ERYTHROCYTE [DISTWIDTH] IN BLOOD BY AUTOMATED COUNT: 13.1 % (ref 10–15)
GFR SERPL CREATININE-BSD FRML MDRD: >90 ML/MIN/{1.73_M2}
GLUCOSE SERPL-MCNC: 93 MG/DL (ref 70–99)
HCT VFR BLD AUTO: 42.5 % (ref 35–47)
HGB BLD-MCNC: 14.8 G/DL (ref 11.7–15.7)
IMM GRANULOCYTES # BLD: 0 10E9/L (ref 0–0.4)
IMM GRANULOCYTES NFR BLD: 0.3 %
LYMPHOCYTES # BLD AUTO: 2.1 10E9/L (ref 0.8–5.3)
LYMPHOCYTES NFR BLD AUTO: 30 %
MCH RBC QN AUTO: 30.2 PG (ref 26.5–33)
MCHC RBC AUTO-ENTMCNC: 34.8 G/DL (ref 31.5–36.5)
MCV RBC AUTO: 87 FL (ref 78–100)
MONOCYTES # BLD AUTO: 0.5 10E9/L (ref 0–1.3)
MONOCYTES NFR BLD AUTO: 7.5 %
NEUTROPHILS # BLD AUTO: 4.2 10E9/L (ref 1.6–8.3)
NEUTROPHILS NFR BLD AUTO: 59.4 %
NRBC # BLD AUTO: 0 10*3/UL
NRBC BLD AUTO-RTO: 0 /100
PLATELET # BLD AUTO: 211 10E9/L (ref 150–450)
POTASSIUM SERPL-SCNC: 3.6 MMOL/L (ref 3.4–5.3)
PROT SERPL-MCNC: 7.6 G/DL (ref 6.8–8.8)
RBC # BLD AUTO: 4.9 10E12/L (ref 3.8–5.2)
SODIUM SERPL-SCNC: 138 MMOL/L (ref 133–144)
WBC # BLD AUTO: 7.1 10E9/L (ref 4–11)

## 2019-06-24 PROCEDURE — 36415 COLL VENOUS BLD VENIPUNCTURE: CPT | Performed by: FAMILY MEDICINE

## 2019-06-24 PROCEDURE — 80053 COMPREHEN METABOLIC PANEL: CPT | Performed by: FAMILY MEDICINE

## 2019-06-24 PROCEDURE — 99214 OFFICE O/P EST MOD 30 MIN: CPT | Performed by: FAMILY MEDICINE

## 2019-06-24 PROCEDURE — 85025 COMPLETE CBC W/AUTO DIFF WBC: CPT | Performed by: FAMILY MEDICINE

## 2019-06-24 PROCEDURE — 74019 RADEX ABDOMEN 2 VIEWS: CPT | Mod: TC

## 2019-06-24 ASSESSMENT — PAIN SCALES - GENERAL: PAINLEVEL: NO PAIN (0)

## 2019-06-24 NOTE — PROGRESS NOTES
Subjective     Arabella Desai is a 29 year old female who presents to clinic today for the following health issues:    HPI   Abdominal Pain      Duration: since last thursday    Description (location/character/radiation): comes in waves, mid upper abd       Associated flank pain: Yes    Intensity:  9/10 when it comes    Accompanying signs and symptoms:        Fever/Chills: YES- chills       Gas/Bloating: no        Nausea/vomitting: YES       Diarrhea: no        Dysuria or Hematuria: no, but has been bleeding with her period for 22 days    History (previous similar pain/trauma/previous testing): had a couple bouts of diverticulitis previously    Precipitating or alleviating factors:       Pain worse with eating/BM/urination: None       Pain relieved by BM: no     Therapies tried and outcome: Saturday tried pepto which helped a little    LMP:  Has had her period for 22 days now. She has had her implant in for 1.5 years.   Mom had bowel trouble and gallbladder out and patient has had diverticulitis in the past    Patient Active Problem List   Diagnosis     Tobacco use disorder     Need for influenza vaccination     Supervision of other normal pregnancy, antepartum     Proteinuria     History of depression      (spontaneous vaginal delivery)     Well woman exam with routine gynecological exam     Chronic seasonal allergic rhinitis due to pollen     Chronic cough     Glucose intolerance of pregnancy     Past Surgical History:   Procedure Laterality Date     CYSTOSCOPY         Social History     Tobacco Use     Smoking status: Current Every Day Smoker     Packs/day: 0.50     Types: Cigarettes     Smokeless tobacco: Never Used   Substance Use Topics     Alcohol use: Yes     Alcohol/week: 0.0 oz     Comment: occasional     Family History   Problem Relation Age of Onset     Other - See Comments Paternal Grandmother         brain aneurysm     Cancer Maternal Grandmother         lung     Diabetes Maternal Grandmother       Heart Disease Other 70        myocardial infarction         Current Outpatient Medications   Medication Sig Dispense Refill     etonogestrel (IMPLANON/NEXPLANON) 68 MG IMPL 1 each (68 mg) by Subdermal route continuous  0     Allergies   Allergen Reactions     Latex Rash     BP Readings from Last 3 Encounters:   06/24/19 116/64   04/30/19 118/68   03/13/19 106/76    Wt Readings from Last 3 Encounters:   06/24/19 76 kg (167 lb 9.6 oz)   04/30/19 76.2 kg (168 lb)   03/13/19 75.8 kg (167 lb)        Reviewed and updated as needed this visit by Provider         Review of Systems   ROS COMP: Constitutional, HEENT, cardiovascular, pulmonary, gi and gu systems are negative, except as otherwise noted.      Objective    /64 (BP Location: Right arm, Patient Position: Chair, Cuff Size: Adult Regular)   Pulse 97   Temp 98.7  F (37.1  C) (Tympanic)   Resp 20   Wt 76 kg (167 lb 9.6 oz)   SpO2 98%   BMI 31.67 kg/m    Body mass index is 31.67 kg/m .  Physical Exam   GENERAL: healthy, alert and no distress  NECK: no adenopathy, no asymmetry, masses, or scars and thyroid normal to palpation  RESP: lungs clear to auscultation - no rales, rhonchi or wheezes  CV: regular rate and rhythm, normal S1 S2, no S3 or S4, no murmur, click or rub, no peripheral edema and peripheral pulses strong  ABDOMEN: soft, tender epigastrium, no hepatosplenomegaly, no masses and bowel sounds normal, non tender LLQ, negative toribio's sign  MS: no gross musculoskeletal defects noted, no edema  PSYCH: mentation appears normal, affect normal/bright    Diagnostic Test Results:  Labs reviewed in Epic  Results for orders placed or performed in visit on 06/24/19   CBC with platelets differential   Result Value Ref Range    WBC 7.1 4.0 - 11.0 10e9/L    RBC Count 4.90 3.8 - 5.2 10e12/L    Hemoglobin 14.8 11.7 - 15.7 g/dL    Hematocrit 42.5 35.0 - 47.0 %    MCV 87 78 - 100 fl    MCH 30.2 26.5 - 33.0 pg    MCHC 34.8 31.5 - 36.5 g/dL    RDW 13.1 10.0 - 15.0 %  "   Platelet Count 211 150 - 450 10e9/L    Diff Method Automated Method     % Neutrophils 59.4 %    % Lymphocytes 30.0 %    % Monocytes 7.5 %    % Eosinophils 1.8 %    % Basophils 1.0 %    % Immature Granulocytes 0.3 %    Nucleated RBCs 0 0 /100    Absolute Neutrophil 4.2 1.6 - 8.3 10e9/L    Absolute Lymphocytes 2.1 0.8 - 5.3 10e9/L    Absolute Monocytes 0.5 0.0 - 1.3 10e9/L    Absolute Eosinophils 0.1 0.0 - 0.7 10e9/L    Absolute Basophils 0.1 0.0 - 0.2 10e9/L    Abs Immature Granulocytes 0.0 0 - 0.4 10e9/L    Absolute Nucleated RBC 0.0            Assessment & Plan     (R10.13) Abdominal pain, epigastric  (primary encounter diagnosis)  Comment:   Plan: XR ABDOMEN 2 VW (Clinic Performed), CBC with         platelets differential, Comprehensive metabolic        panel        Constipation seen on xray    (K29.00) Other acute gastritis without hemorrhage  Comment:   Plan: start zantac   ranitidine (ZANTAC) 300 MG tablet        Take for 2-4 wks    (K59.01) Slow transit constipation  Comment:   Plan: see instructions      Tobacco Cessation:   reports that she has been smoking cigarettes.  She has been smoking about 0.50 packs per day. She has never used smokeless tobacco.  Tobacco Cessation Action Plan: Information offered: Patient not interested at this time      BMI:   Estimated body mass index is 31.67 kg/m  as calculated from the following:    Height as of 4/30/19: 1.549 m (5' 1\").    Weight as of this encounter: 76 kg (167 lb 9.6 oz).   Weight management plan: Discussed healthy diet and exercise guidelines      Patient was agreeable to this plan and had no further questions.  Patient Instructions   Increase fruits vegetables and fiber, beans, greens   Increase water  Increase activity  Use miralax as needed for daily bowel movement      No follow-ups on file.    Lady Dean MD  [facility] [place]        "

## 2019-06-24 NOTE — PATIENT INSTRUCTIONS
Increase fruits vegetables and fiber, beans, greens   Increase water  Increase activity  Use miralax as needed for daily bowel movement

## 2019-06-24 NOTE — NURSING NOTE
"Chief Complaint   Patient presents with     Abdominal Pain       Initial /64 (BP Location: Right arm, Patient Position: Chair, Cuff Size: Adult Regular)   Pulse 97   Temp 98.7  F (37.1  C) (Tympanic)   Resp 20   Wt 76 kg (167 lb 9.6 oz)   SpO2 98%   BMI 31.67 kg/m   Estimated body mass index is 31.67 kg/m  as calculated from the following:    Height as of 4/30/19: 1.549 m (5' 1\").    Weight as of this encounter: 76 kg (167 lb 9.6 oz).  Medication Reconciliation: complete   Yari Disla LPN          "

## 2019-12-10 ENCOUNTER — OFFICE VISIT (OUTPATIENT)
Dept: CHIROPRACTIC MEDICINE | Facility: OTHER | Age: 29
End: 2019-12-10
Attending: CHIROPRACTOR
Payer: COMMERCIAL

## 2019-12-10 DIAGNOSIS — M54.50 ACUTE BILATERAL LOW BACK PAIN WITHOUT SCIATICA: ICD-10-CM

## 2019-12-10 DIAGNOSIS — M99.03 SEGMENTAL AND SOMATIC DYSFUNCTION OF LUMBAR REGION: Primary | ICD-10-CM

## 2019-12-10 DIAGNOSIS — M99.02 SEGMENTAL AND SOMATIC DYSFUNCTION OF THORACIC REGION: ICD-10-CM

## 2019-12-10 PROCEDURE — 99212 OFFICE O/P EST SF 10 MIN: CPT | Mod: 25 | Performed by: CHIROPRACTOR

## 2019-12-10 PROCEDURE — 98940 CHIROPRACT MANJ 1-2 REGIONS: CPT | Mod: AT | Performed by: CHIROPRACTOR

## 2019-12-11 NOTE — PROGRESS NOTES
Subjective Finding:    Chief compalint: Patient presents with:  Back Pain  , Pain Scale: 6/10, Intensity: sharp, Duration: 2 weeks, Radiating:  no.    Date of injury:     Activities that the pain restricts:   Home/household/hobbies/social activities: yes.  Work duties: yes.  Sleep: yes.  Makes symptoms better: rest.  Makes symptoms worse: lumbar flexion.  Have you seen anyone else for the symptoms? No.  Work related: no.  Automobile related injury: no.    Objective and Assessment:    Posture Analysis:   High shoulder: .  Head tilt: .  High iliac crest: .  Head carriage: neutral.  Thoracic Kyphosis: neutral.  Lumbar Lordosis: forward.    Lumbar Range of Motion: extension decreased.  Cervical Range of Motion: .  Thoracic Range of Motion: .  Extremity Range of Motion: .    Palpation:   Quad lumb: bilateral, referred pain: no    Segmental dysfunction pre-treatment and treatment area: T9, T10, L1, L4 and L5.    Assessment post-treatment:  Cervical: .  Thoracic: ROM increased.  Lumbar: ROM increased.    Comments: .      Complicating Factors: .    Procedure(s):  CMT:  53549 Chiropractic manipulative treatment 1-2 regions performed   Thoracic: Diversified, See above for level, Prone and Lumbar: Diversified, See above for level, Side posture    Modalities:  None performed this visit    Therapeutic procedures:  None    Plan:  Treatment plan: PRN.  Instructed patient: stretch as instructed at visit.  Short term goals: reduce pain.  Long term goals: .  Prognosis: excellent.

## 2020-03-12 ENCOUNTER — OFFICE VISIT (OUTPATIENT)
Dept: FAMILY MEDICINE | Facility: OTHER | Age: 30
End: 2020-03-12
Attending: FAMILY MEDICINE
Payer: COMMERCIAL

## 2020-03-12 VITALS
BODY MASS INDEX: 33.23 KG/M2 | TEMPERATURE: 99.3 F | WEIGHT: 176 LBS | HEART RATE: 110 BPM | HEIGHT: 61 IN | DIASTOLIC BLOOD PRESSURE: 58 MMHG | RESPIRATION RATE: 18 BRPM | SYSTOLIC BLOOD PRESSURE: 124 MMHG | OXYGEN SATURATION: 97 %

## 2020-03-12 DIAGNOSIS — R05.9 COUGH: ICD-10-CM

## 2020-03-12 DIAGNOSIS — J06.9 URI, ACUTE: Primary | ICD-10-CM

## 2020-03-12 PROCEDURE — 99213 OFFICE O/P EST LOW 20 MIN: CPT | Performed by: FAMILY MEDICINE

## 2020-03-12 RX ORDER — ALBUTEROL SULFATE 90 UG/1
2 AEROSOL, METERED RESPIRATORY (INHALATION) EVERY 6 HOURS
Qty: 1 INHALER | Refills: 0 | Status: SHIPPED | OUTPATIENT
Start: 2020-03-12 | End: 2020-09-02

## 2020-03-12 ASSESSMENT — MIFFLIN-ST. JEOR: SCORE: 1455.71

## 2020-03-12 ASSESSMENT — PAIN SCALES - GENERAL: PAINLEVEL: NO PAIN (0)

## 2020-03-12 NOTE — PATIENT INSTRUCTIONS
Smoking cessation advised.  Inhaler for as needed use.  Call if not improving - can add antibiotics.      Patient Education     Viral Upper Respiratory Illness (Adult)    You have a viral upper respiratory illness (URI), which is another term for the common cold. This illness is contagious during the first few days. It is spread through the air by coughing and sneezing. It may also be spread by direct contact (touching the sick person and then touching your own eyes, nose, or mouth). Frequent handwashing will decrease risk of spread. Most viral illnesses go away within 7 to 10 days with rest and simple home remedies. Sometimes the illness may last for several weeks. Antibiotics will not kill a virus, and they are generally not prescribed for this condition.  Home care    If symptoms are severe, rest at home for the first 2 to 3 days. When you resume activity, don't let yourself get too tired.    Don't smoke. If you need help stopping, talk with your healthcare provider.    Avoid being exposed to cigarette smoke (yours or others ).    You may use acetaminophen or ibuprofen to control pain and fever, unless another medicine was prescribed. If you have chronic liver or kidney disease, have ever had a stomach ulcer or gastrointestinal bleeding, or are taking blood-thinning medicines, talk with your healthcare provider before using these medicines. Aspirin should never be given to anyone under 18 years of age who is ill with a viral infection or fever. It may cause severe liver or brain damage.    Your appetite may be poor, so a light diet is fine. Stay well hydrated by drinking 6 to 8 glasses of fluids per day (water, soft drinks, juices, tea, or soup). Extra fluids will help loosen secretions in the nose and lungs.    Over-the-counter cold medicines will not shorten the length of time you re sick, but they may be helpful for the following symptoms: cough, sore throat, and nasal and sinus congestion. If you take  prescription medicines, ask your healthcare provider or pharmacist which over-the-counter medicines are safe to use. (Note: Don't use decongestants if you have high blood pressure.)  Follow-up care  Follow up with your healthcare provider, or as advised.  When to seek medical advice  Call your healthcare provider right away if any of these occur:    Cough with lots of colored sputum (mucus)    Severe headache; face, neck, or ear pain    Difficulty swallowing due to throat pain    Fever of 100.4 F (38 C) or higher, or as directed by your healthcare provider  Call 911  Call 911 if any of these occur:    Chest pain, shortness of breath, wheezing, or difficulty breathing    Coughing up blood    Very severe pain with swallowing, especially if it goes along with a muffled voice   Date Last Reviewed: 6/1/2018 2000-2019 [employer] [address] [place] [address]. All rights reserved. This information is not intended as a substitute for professional medical care. Always follow your healthcare professional's instructions.

## 2020-03-12 NOTE — NURSING NOTE
"Chief Complaint   Patient presents with     Cough       Initial /58 (BP Location: Right arm, Patient Position: Sitting, Cuff Size: Adult Regular)   Pulse 110   Temp 99.3  F (37.4  C) (Tympanic)   Resp 18   Ht 1.549 m (5' 1\")   Wt 79.8 kg (176 lb)   SpO2 97%   BMI 33.25 kg/m   Estimated body mass index is 33.25 kg/m  as calculated from the following:    Height as of this encounter: 1.549 m (5' 1\").    Weight as of this encounter: 79.8 kg (176 lb).  Medication Reconciliation: complete  Loli Bills LPN  "

## 2020-03-12 NOTE — PROGRESS NOTES
"Subjective     Arabella Desai is a 30 year old female who presents to clinic today for the following health issues:    HPI   RESPIRATORY SYMPTOMS      Duration: 6 weeks    Description  rhinorrhea, cough, wheezing and fatigue/malaise    Severity: mild    Accompanying signs and symptoms: None    History (predisposing factors):  Tobacco use    Precipitating or alleviating factors: None    Therapies tried and outcome:  None      Started as sinus cold, resolved after 2 weeks    Persistent cough, more productive in morning, yellowish sputum    Sneezing, no allergies    Some ear pressure    No facial pressure    No sore throat    No shortness of breath    No fever/chills    No pain    No body aches    No asthma/COPD    Bowel/urine habits normal    V[place] had RSV 6 weeks ago    No influenza in home    Coworker sick with similar symptoms    Current smoker, 1/2 pack/day 15 years    Current Outpatient Medications   Medication     albuterol (PROAIR HFA/PROVENTIL HFA/VENTOLIN HFA) 108 (90 Base) MCG/ACT inhaler     etonogestrel (IMPLANON/NEXPLANON) 68 MG IMPL     ranitidine (ZANTAC) 300 MG tablet     No current facility-administered medications for this visit.        Reviewed and updated as needed this visit by Provider  Tobacco  Allergies  Meds  Med Hx  Surg Hx  Fam Hx  Soc Hx        Review of Systems   ROS COMP: Constitutional, HEENT, cardiovascular, pulmonary, gi and gu systems are negative, except as otherwise noted.      Objective    /58 (BP Location: Right arm, Patient Position: Sitting, Cuff Size: Adult Regular)   Pulse 110   Temp 99.3  F (37.4  C) (Tympanic)   Resp 18   Ht 1.549 m (5' 1\")   Wt 79.8 kg (176 lb)   SpO2 97%   BMI 33.25 kg/m    Body mass index is 33.25 kg/m .  Physical Exam   GENERAL: healthy, alert and no distress; dry, harsh cough noted  EYES: Eyes grossly normal to inspection, PERRL and conjunctivae and sclerae normal  HENT: ear canals and TM's normal, nose and mouth without ulcers or " lesions  NECK: no adenopathy, no asymmetry, masses, or scars and thyroid normal to palpation  RESP: lungs clear to auscultation - no rales, rhonchi or wheezes  CV: regular rate and rhythm, normal S1 S2, no S3 or S4, no murmur, click or rub, no peripheral edema and peripheral pulses strong  MS: no gross musculoskeletal defects noted, no edema  SKIN: no suspicious lesions or rashes  NEURO: Normal strength and tone, mentation intact and speech normal  PSYCH: mentation appears normal, affect normal/bright          ASSESSMENT/PLAN:      ICD-10-CM    1. URI, acute  J06.9 albuterol (PROAIR HFA/PROVENTIL HFA/VENTOLIN HFA) 108 (90 Base) MCG/ACT inhaler     Physical exam supportive of persistent viral URI, advised symptomatic treatment including, prescribed inhaler, OTC decongestants, honey/tea, etc.  If fever develops, symptoms worsen or continue beyond another 1-2 weeks, antibiotics can be used.    Patient Instructions     Smoking cessation advised.  Inhaler for as needed use.  Call if not improving - can add antibiotics.      Patient Education     Viral Upper Respiratory Illness (Adult)    You have a viral upper respiratory illness (URI), which is another term for the common cold. This illness is contagious during the first few days. It is spread through the air by coughing and sneezing. It may also be spread by direct contact (touching the sick person and then touching your own eyes, nose, or mouth). Frequent handwashing will decrease risk of spread. Most viral illnesses go away within 7 to 10 days with rest and simple home remedies. Sometimes the illness may last for several weeks. Antibiotics will not kill a virus, and they are generally not prescribed for this condition.  Home care    If symptoms are severe, rest at home for the first 2 to 3 days. When you resume activity, don't let yourself get too tired.    Don't smoke. If you need help stopping, talk with your healthcare provider.    Avoid being exposed to cigarette  smoke (yours or others ).    You may use acetaminophen or ibuprofen to control pain and fever, unless another medicine was prescribed. If you have chronic liver or kidney disease, have ever had a stomach ulcer or gastrointestinal bleeding, or are taking blood-thinning medicines, talk with your healthcare provider before using these medicines. Aspirin should never be given to anyone under 18 years of age who is ill with a viral infection or fever. It may cause severe liver or brain damage.    Your appetite may be poor, so a light diet is fine. Stay well hydrated by drinking 6 to 8 glasses of fluids per day (water, soft drinks, juices, tea, or soup). Extra fluids will help loosen secretions in the nose and lungs.    Over-the-counter cold medicines will not shorten the length of time you re sick, but they may be helpful for the following symptoms: cough, sore throat, and nasal and sinus congestion. If you take prescription medicines, ask your healthcare provider or pharmacist which over-the-counter medicines are safe to use. (Note: Don't use decongestants if you have high blood pressure.)  Follow-up care  Follow up with your healthcare provider, or as advised.  When to seek medical advice  Call your healthcare provider right away if any of these occur:    Cough with lots of colored sputum (mucus)    Severe headache; face, neck, or ear pain    Difficulty swallowing due to throat pain    Fever of 100.4 F (38 C) or higher, or as directed by your healthcare provider  Call 911  Call 911 if any of these occur:    Chest pain, shortness of breath, wheezing, or difficulty breathing    Coughing up blood    Very severe pain with swallowing, especially if it goes along with a muffled voice   Date Last Reviewed: 6/1/2018 2000-2019 [employer]. [address] [place] [address]. All rights reserved. This information is not intended as a substitute for professional medical care. Always follow your healthcare  professional's instructions.

## 2020-03-17 ENCOUNTER — MYC MEDICAL ADVICE (OUTPATIENT)
Dept: FAMILY MEDICINE | Facility: OTHER | Age: 30
End: 2020-03-17

## 2020-03-17 NOTE — TELEPHONE ENCOUNTER
Per pt via my-chart:  I came in last Thursday for a chronic cough appointment. I have now developed a very raspy voice from the inhaler that was given. It has relived my cough though. Also, I have a bladder infection that has not gone away from using AZO over the counter medication. Is there something we can do about the bladder infection? And also is the raspy voice normal? Thank you. [phone]  Please advise r/t pt questions/concerns.  Hanh Hare LPN

## 2020-03-18 NOTE — TELEPHONE ENCOUNTER
Would not be concerned about short term raspy voice.  It is likely from all the coughing - causing more of a laryngitis.  Ok to use inhaler.    As for possible UTI - only way to know is to treat.  If patient would like to be seen - can schedule.

## 2020-06-01 ENCOUNTER — OFFICE VISIT (OUTPATIENT)
Dept: FAMILY MEDICINE | Facility: OTHER | Age: 30
End: 2020-06-01
Attending: FAMILY MEDICINE
Payer: COMMERCIAL

## 2020-06-01 ENCOUNTER — ANCILLARY PROCEDURE (OUTPATIENT)
Dept: GENERAL RADIOLOGY | Facility: OTHER | Age: 30
End: 2020-06-01
Attending: FAMILY MEDICINE
Payer: COMMERCIAL

## 2020-06-01 VITALS
HEART RATE: 95 BPM | DIASTOLIC BLOOD PRESSURE: 70 MMHG | WEIGHT: 172 LBS | OXYGEN SATURATION: 98 % | TEMPERATURE: 99.2 F | SYSTOLIC BLOOD PRESSURE: 115 MMHG | RESPIRATION RATE: 18 BRPM | BODY MASS INDEX: 32.47 KG/M2 | HEIGHT: 61 IN

## 2020-06-01 DIAGNOSIS — K59.01 SLOW TRANSIT CONSTIPATION: ICD-10-CM

## 2020-06-01 DIAGNOSIS — R10.13 ABDOMINAL PAIN, EPIGASTRIC: Primary | ICD-10-CM

## 2020-06-01 DIAGNOSIS — K26.9 DUODENAL ULCER DUE TO HELICOBACTER PYLORI: ICD-10-CM

## 2020-06-01 DIAGNOSIS — B96.81 DUODENAL ULCER DUE TO HELICOBACTER PYLORI: ICD-10-CM

## 2020-06-01 DIAGNOSIS — R10.13 ABDOMINAL PAIN, EPIGASTRIC: ICD-10-CM

## 2020-06-01 LAB
ALBUMIN SERPL-MCNC: 4.3 G/DL (ref 3.4–5)
ALBUMIN UR-MCNC: NEGATIVE MG/DL
ALP SERPL-CCNC: 80 U/L (ref 40–150)
ALT SERPL W P-5'-P-CCNC: 20 U/L (ref 0–50)
ANION GAP SERPL CALCULATED.3IONS-SCNC: 5 MMOL/L (ref 3–14)
APPEARANCE UR: CLEAR
AST SERPL W P-5'-P-CCNC: 10 U/L (ref 0–45)
BASOPHILS # BLD AUTO: 0.1 10E9/L (ref 0–0.2)
BASOPHILS NFR BLD AUTO: 0.7 %
BILIRUB SERPL-MCNC: 0.4 MG/DL (ref 0.2–1.3)
BILIRUB UR QL STRIP: NEGATIVE
BUN SERPL-MCNC: 8 MG/DL (ref 7–30)
CALCIUM SERPL-MCNC: 9 MG/DL (ref 8.5–10.1)
CHLORIDE SERPL-SCNC: 109 MMOL/L (ref 94–109)
CO2 SERPL-SCNC: 25 MMOL/L (ref 20–32)
COLOR UR AUTO: ABNORMAL
CREAT SERPL-MCNC: 0.68 MG/DL (ref 0.52–1.04)
DIFFERENTIAL METHOD BLD: NORMAL
EOSINOPHIL # BLD AUTO: 0.2 10E9/L (ref 0–0.7)
EOSINOPHIL NFR BLD AUTO: 1.9 %
ERYTHROCYTE [DISTWIDTH] IN BLOOD BY AUTOMATED COUNT: 12.4 % (ref 10–15)
GFR SERPL CREATININE-BSD FRML MDRD: >90 ML/MIN/{1.73_M2}
GLUCOSE SERPL-MCNC: 90 MG/DL (ref 70–99)
GLUCOSE UR STRIP-MCNC: NEGATIVE MG/DL
HCT VFR BLD AUTO: 43.8 % (ref 35–47)
HGB BLD-MCNC: 15.1 G/DL (ref 11.7–15.7)
HGB UR QL STRIP: NEGATIVE
IMM GRANULOCYTES # BLD: 0 10E9/L (ref 0–0.4)
IMM GRANULOCYTES NFR BLD: 0.2 %
KETONES UR STRIP-MCNC: 10 MG/DL
LEUKOCYTE ESTERASE UR QL STRIP: NEGATIVE
LYMPHOCYTES # BLD AUTO: 2.1 10E9/L (ref 0.8–5.3)
LYMPHOCYTES NFR BLD AUTO: 24.5 %
MCH RBC QN AUTO: 30.9 PG (ref 26.5–33)
MCHC RBC AUTO-ENTMCNC: 34.5 G/DL (ref 31.5–36.5)
MCV RBC AUTO: 90 FL (ref 78–100)
MONOCYTES # BLD AUTO: 0.5 10E9/L (ref 0–1.3)
MONOCYTES NFR BLD AUTO: 6.4 %
NEUTROPHILS # BLD AUTO: 5.6 10E9/L (ref 1.6–8.3)
NEUTROPHILS NFR BLD AUTO: 66.3 %
NITRATE UR QL: NEGATIVE
NRBC # BLD AUTO: 0 10*3/UL
NRBC BLD AUTO-RTO: 0 /100
PH UR STRIP: 5.5 PH (ref 4.7–8)
PLATELET # BLD AUTO: 249 10E9/L (ref 150–450)
POTASSIUM SERPL-SCNC: 3.8 MMOL/L (ref 3.4–5.3)
PROT SERPL-MCNC: 7.7 G/DL (ref 6.8–8.8)
RBC # BLD AUTO: 4.88 10E12/L (ref 3.8–5.2)
SODIUM SERPL-SCNC: 139 MMOL/L (ref 133–144)
SOURCE: ABNORMAL
SP GR UR STRIP: 1.02 (ref 1–1.03)
UROBILINOGEN UR STRIP-MCNC: NORMAL MG/DL (ref 0–2)
WBC # BLD AUTO: 8.4 10E9/L (ref 4–11)

## 2020-06-01 PROCEDURE — 36415 COLL VENOUS BLD VENIPUNCTURE: CPT | Performed by: FAMILY MEDICINE

## 2020-06-01 PROCEDURE — 85025 COMPLETE CBC W/AUTO DIFF WBC: CPT | Performed by: FAMILY MEDICINE

## 2020-06-01 PROCEDURE — 99214 OFFICE O/P EST MOD 30 MIN: CPT | Performed by: FAMILY MEDICINE

## 2020-06-01 PROCEDURE — 80053 COMPREHEN METABOLIC PANEL: CPT | Performed by: FAMILY MEDICINE

## 2020-06-01 PROCEDURE — 81003 URINALYSIS AUTO W/O SCOPE: CPT | Performed by: FAMILY MEDICINE

## 2020-06-01 PROCEDURE — 87338 HPYLORI STOOL AG IA: CPT | Performed by: FAMILY MEDICINE

## 2020-06-01 PROCEDURE — 74019 RADEX ABDOMEN 2 VIEWS: CPT | Mod: TC

## 2020-06-01 ASSESSMENT — MIFFLIN-ST. JEOR: SCORE: 1437.57

## 2020-06-01 ASSESSMENT — PAIN SCALES - GENERAL: PAINLEVEL: EXTREME PAIN (8)

## 2020-06-01 NOTE — PATIENT INSTRUCTIONS
1.  Greek yogurt daily or kefir 4oz/day  2.  Fiber in at breakfast  3.  prilosec daily for 10-14 days and then stop  4.  miralax  1-2 tsp daily as needed

## 2020-06-01 NOTE — PROGRESS NOTES
Subjective     Arabella Desai is a 30 year old female who presents to clinic today for the following health issues:    HPI   Abdominal Pain      Duration: on and off for 2 weeks    Description (location/character/radiation): sharp abdominal pain on and off       Associated flank pain: yes    Intensity:  8/10    Accompanying signs and symptoms:        Fever/Chills: no        Gas/Bloating: YES       Nausea/vomitting: YES, nausea       Diarrhea: no        Dysuria or Hematuria: no     History (previous similar pain/trauma/previous testing): none    Precipitating or alleviating factors:       Pain worse with eating/BM/urination: no       Pain relieved by BM: no     Therapies tried and outcome: None    LMP:  2020    Feels this is a different pain than when she has had diverticulitis  Pain is more epigastric  Sometimes drinking water and/or eating helps  Zantac helps  No particular food or drink makes it worse  Mom had gallbladder out  Bowels have changed lately -- going more and its smaller in diameter    Patient Active Problem List   Diagnosis     Tobacco use disorder     Need for influenza vaccination     Supervision of other normal pregnancy, antepartum     Proteinuria     History of depression      (spontaneous vaginal delivery)     Well woman exam with routine gynecological exam     Chronic seasonal allergic rhinitis due to pollen     Chronic cough     Glucose intolerance of pregnancy     Slow transit constipation     Other acute gastritis without hemorrhage     Past Surgical History:   Procedure Laterality Date     CYSTOSCOPY         Social History     Tobacco Use     Smoking status: Current Every Day Smoker     Packs/day: 0.50     Types: Cigarettes     Smokeless tobacco: Never Used   Substance Use Topics     Alcohol use: Yes     Alcohol/week: 0.0 standard drinks     Comment: occasional     Family History   Problem Relation Age of Onset     Other - See Comments Paternal Grandmother         brain aneurysm  "    Cancer Maternal Grandmother         lung     Diabetes Maternal Grandmother      Heart Disease Other 70        myocardial infarction         Current Outpatient Medications   Medication Sig Dispense Refill     albuterol (PROAIR HFA/PROVENTIL HFA/VENTOLIN HFA) 108 (90 Base) MCG/ACT inhaler Inhale 2 puffs into the lungs every 6 hours 1 Inhaler 0     etonogestrel (IMPLANON/NEXPLANON) 68 MG IMPL 1 each (68 mg) by Subdermal route continuous  0     Allergies   Allergen Reactions     Latex Rash     BP Readings from Last 3 Encounters:   06/01/20 115/70   03/12/20 124/58   06/24/19 116/64    Wt Readings from Last 3 Encounters:   06/01/20 78 kg (172 lb)   03/12/20 79.8 kg (176 lb)   06/24/19 76 kg (167 lb 9.6 oz)        Reviewed and updated as needed this visit by Provider         Review of Systems   Constitutional, HEENT, cardiovascular, pulmonary, gi and gu systems are negative, except as otherwise noted.      Objective    /70 (BP Location: Right arm, Patient Position: Sitting, Cuff Size: Adult Regular)   Pulse 95   Temp 99.2  F (37.3  C) (Tympanic)   Resp 18   Ht 1.549 m (5' 1\")   Wt 78 kg (172 lb)   SpO2 98%   BMI 32.50 kg/m    Body mass index is 32.5 kg/m .  Physical Exam   GENERAL: healthy, alert and no distress  NECK: no adenopathy, no asymmetry, masses, or scars and thyroid normal to palpation  RESP: lungs clear to auscultation - no rales, rhonchi or wheezes  CV: regular rate and rhythm, normal S1 S2, no S3 or S4, no murmur, click or rub, no peripheral edema and peripheral pulses strong  ABDOMEN: soft, nontender, no hepatosplenomegaly, no masses and bowel sounds normal  MS: no gross musculoskeletal defects noted, no edema  PSYCH: mentation appears normal, affect normal/bright    Diagnostic Test Results:  Labs reviewed in Epic  Results for orders placed or performed in visit on 06/01/20   XR ABDOMEN 2 VW (Clinic Performed)     Status: None    Narrative    PROCEDURE: XR ABDOMEN 2 VW 6/1/2020 2:01 " PM    HISTORY: Abdominal pain, epigastric    COMPARISONS: None.    TECHNIQUE: Flat and upright    FINDINGS: The intestinal gas pattern is normal. There is no  extraluminal gas or pathologic intra-abdominal calcifications         Impression    IMPRESSION: Normal abdominal gas pattern    ANETTE FORD MD   CBC with platelets differential     Status: None   Result Value Ref Range    WBC 8.4 4.0 - 11.0 10e9/L    RBC Count 4.88 3.8 - 5.2 10e12/L    Hemoglobin 15.1 11.7 - 15.7 g/dL    Hematocrit 43.8 35.0 - 47.0 %    MCV 90 78 - 100 fl    MCH 30.9 26.5 - 33.0 pg    MCHC 34.5 31.5 - 36.5 g/dL    RDW 12.4 10.0 - 15.0 %    Platelet Count 249 150 - 450 10e9/L    Diff Method Automated Method     % Neutrophils 66.3 %    % Lymphocytes 24.5 %    % Monocytes 6.4 %    % Eosinophils 1.9 %    % Basophils 0.7 %    % Immature Granulocytes 0.2 %    Nucleated RBCs 0 0 /100    Absolute Neutrophil 5.6 1.6 - 8.3 10e9/L    Absolute Lymphocytes 2.1 0.8 - 5.3 10e9/L    Absolute Monocytes 0.5 0.0 - 1.3 10e9/L    Absolute Eosinophils 0.2 0.0 - 0.7 10e9/L    Absolute Basophils 0.1 0.0 - 0.2 10e9/L    Abs Immature Granulocytes 0.0 0 - 0.4 10e9/L    Absolute Nucleated RBC 0.0    Comprehensive metabolic panel     Status: None   Result Value Ref Range    Sodium 139 133 - 144 mmol/L    Potassium 3.8 3.4 - 5.3 mmol/L    Chloride 109 94 - 109 mmol/L    Carbon Dioxide 25 20 - 32 mmol/L    Anion Gap 5 3 - 14 mmol/L    Glucose 90 70 - 99 mg/dL    Urea Nitrogen 8 7 - 30 mg/dL    Creatinine 0.68 0.52 - 1.04 mg/dL    GFR Estimate >90 >60 mL/min/[1.73_m2]    GFR Estimate If Black >90 >60 mL/min/[1.73_m2]    Calcium 9.0 8.5 - 10.1 mg/dL    Bilirubin Total 0.4 0.2 - 1.3 mg/dL    Albumin 4.3 3.4 - 5.0 g/dL    Protein Total 7.7 6.8 - 8.8 g/dL    Alkaline Phosphatase 80 40 - 150 U/L    ALT 20 0 - 50 U/L    AST 10 0 - 45 U/L   UA reflex to Microscopic and Culture - [place]     Status: Abnormal    Specimen: Midstream Urine   Result Value Ref Range    Color Urine  "Light Yellow     Appearance Urine Clear     Glucose Urine Negative NEG^Negative mg/dL    Bilirubin Urine Negative NEG^Negative    Ketones Urine 10 (A) NEG^Negative mg/dL    Specific Gravity Urine 1.016 1.003 - 1.035    Blood Urine Negative NEG^Negative    pH Urine 5.5 4.7 - 8.0 pH    Protein Albumin Urine Negative NEG^Negative mg/dL    Urobilinogen mg/dL Normal 0.0 - 2.0 mg/dL    Nitrite Urine Negative NEG^Negative    Leukocyte Esterase Urine Negative NEG^Negative    Source Midstream Urine            Assessment & Plan     (R10.13) Abdominal pain, epigastric  (primary encounter diagnosis)  Comment:   Plan: CBC with platelets differential, Comprehensive         metabolic panel, XR ABDOMEN 2 VW (Clinic         Performed), UA reflex to Microscopic and         Culture - [place], Helicobacter pylori Antigen         Stool          (K59.01) Slow transit constipation  Comment:   Plan: Helicobacter pylori Antigen Stool        Discussed probiotics, fiber and miralax prn    (K26.9,  B96.81) Duodenal ulcer due to Helicobacter pylori  Comment:   Plan: Helicobacter pylori Antigen Stool, omeprazole         (PRILOSEC) 20 MG DR capsule        Take medication for 6-8 wks       Tobacco Cessation:   reports that she has been smoking cigarettes. She has been smoking about 0.50 packs per day. She has never used smokeless tobacco.  Tobacco Cessation Action Plan: Information offered: Patient not interested at this time      BMI:   Estimated body mass index is 32.5 kg/m  as calculated from the following:    Height as of this encounter: 1.549 m (5' 1\").    Weight as of this encounter: 78 kg (172 lb).   Weight management plan: Discussed healthy diet and exercise guidelines      Patient was agreeable to this plan and had no further questions.  There are no Patient Instructions on file for this visit.    No follow-ups on file.    Lady Dean MD  [facility] [place]      "

## 2020-06-01 NOTE — NURSING NOTE
"Chief Complaint   Patient presents with     Abdominal Pain       Initial /70 (BP Location: Right arm, Patient Position: Sitting, Cuff Size: Adult Regular)   Pulse 95   Temp 99.2  F (37.3  C) (Tympanic)   Resp 18   Ht 1.549 m (5' 1\")   Wt 78 kg (172 lb)   SpO2 98%   BMI 32.50 kg/m   Estimated body mass index is 32.5 kg/m  as calculated from the following:    Height as of this encounter: 1.549 m (5' 1\").    Weight as of this encounter: 78 kg (172 lb).  Medication Reconciliation: complete  Loli Bills LPN  "

## 2020-06-03 LAB — H PYLORI AG STL QL IA: NEGATIVE

## 2020-09-02 ENCOUNTER — OFFICE VISIT (OUTPATIENT)
Dept: OBGYN | Facility: OTHER | Age: 30
End: 2020-09-02
Attending: ADVANCED PRACTICE MIDWIFE
Payer: COMMERCIAL

## 2020-09-02 VITALS
DIASTOLIC BLOOD PRESSURE: 68 MMHG | WEIGHT: 172 LBS | BODY MASS INDEX: 32.47 KG/M2 | SYSTOLIC BLOOD PRESSURE: 110 MMHG | HEIGHT: 61 IN

## 2020-09-02 DIAGNOSIS — Z12.4 ENCOUNTER FOR SCREENING FOR CERVICAL CANCER: ICD-10-CM

## 2020-09-02 DIAGNOSIS — Z01.419 WELL WOMAN EXAM WITH ROUTINE GYNECOLOGICAL EXAM: Primary | ICD-10-CM

## 2020-09-02 DIAGNOSIS — Z71.6 ENCOUNTER FOR TOBACCO USE CESSATION COUNSELING: ICD-10-CM

## 2020-09-02 DIAGNOSIS — N81.89 PELVIC FLOOR WEAKNESS: ICD-10-CM

## 2020-09-02 PROCEDURE — 99395 PREV VISIT EST AGE 18-39: CPT | Performed by: ADVANCED PRACTICE MIDWIFE

## 2020-09-02 PROCEDURE — 87624 HPV HI-RISK TYP POOLED RSLT: CPT | Performed by: ADVANCED PRACTICE MIDWIFE

## 2020-09-02 PROCEDURE — G0123 SCREEN CERV/VAG THIN LAYER: HCPCS | Performed by: ADVANCED PRACTICE MIDWIFE

## 2020-09-02 RX ORDER — NICOTINE 21 MG/24HR
1 PATCH, TRANSDERMAL 24 HOURS TRANSDERMAL EVERY 24 HOURS
Qty: 28 PATCH | Refills: 2 | Status: SHIPPED | OUTPATIENT
Start: 2020-09-02 | End: 2021-09-13

## 2020-09-02 ASSESSMENT — ANXIETY QUESTIONNAIRES
IF YOU CHECKED OFF ANY PROBLEMS ON THIS QUESTIONNAIRE, HOW DIFFICULT HAVE THESE PROBLEMS MADE IT FOR YOU TO DO YOUR WORK, TAKE CARE OF THINGS AT HOME, OR GET ALONG WITH OTHER PEOPLE: NOT DIFFICULT AT ALL
3. WORRYING TOO MUCH ABOUT DIFFERENT THINGS: NOT AT ALL
2. NOT BEING ABLE TO STOP OR CONTROL WORRYING: NOT AT ALL
1. FEELING NERVOUS, ANXIOUS, OR ON EDGE: SEVERAL DAYS
GAD7 TOTAL SCORE: 1
7. FEELING AFRAID AS IF SOMETHING AWFUL MIGHT HAPPEN: NOT AT ALL
4. TROUBLE RELAXING: NOT AT ALL
6. BECOMING EASILY ANNOYED OR IRRITABLE: NOT AT ALL
5. BEING SO RESTLESS THAT IT IS HARD TO SIT STILL: NOT AT ALL

## 2020-09-02 ASSESSMENT — MIFFLIN-ST. JEOR: SCORE: 1437.57

## 2020-09-02 ASSESSMENT — PAIN SCALES - GENERAL: PAINLEVEL: NO PAIN (0)

## 2020-09-02 ASSESSMENT — PATIENT HEALTH QUESTIONNAIRE - PHQ9: SUM OF ALL RESPONSES TO PHQ QUESTIONS 1-9: 2

## 2020-09-02 NOTE — NURSING NOTE
"Chief Complaint   Patient presents with     Gyn Exam       Initial /68 (BP Location: Left arm, Patient Position: Chair, Cuff Size: Adult Regular)   Ht 1.549 m (5' 1\")   Wt 78 kg (172 lb)   BMI 32.50 kg/m   Estimated body mass index is 32.5 kg/m  as calculated from the following:    Height as of this encounter: 1.549 m (5' 1\").    Weight as of this encounter: 78 kg (172 lb).  Medication Reconciliation: complete  Almaz Gee LPN    "

## 2020-09-02 NOTE — PROGRESS NOTES
LEONIDAS Bell is a 30 year old  female who presents for annual exam.   Youngest child 2  Largest Child 7 lb 1 oz  GDM n  Hypertension n  No LMP recorded.  Menses:  Cycles irregular bleeding with Nexplanon When did they start 13 How many days bleed irregular pain n Contraception Nexplanon.  Planning pregnancy: n  Concerns in addition to routine health maintenance?  Irregular bleeding with Nexplanon.  Concerns with uterine prolapse  GYNECOLOGIC HISTORY:   Surgery: n  History sexually transmitted infections:No STD history   Safe?  y  STI testing offered?  y  History of abnormal Pap smear: n  Problems with sex? (bleeding/pain) n  Family history of: breast cancer: n   Uterine cancer: n ovarian cancer: n   colon cancer: n    HEALTH MAINTENANCE:  Cholesterol: (No results found for: CHOL History of abnormal lipids: na  Mammo: n  . History of abnormal Mammo: na   Regular Self Breast Exams: n Calcium/Vitamin D or Vitamin: n Exercise y, walking/running    TSH: (  TSH   Date Value Ref Range Status   2015 1.58 0.40 - 4.00 mU/L Final     Comment:     Effective 2014, the reference range for this assay has changed to reflect   new instrumentation/methodology.      )  Pap; (  Lab Results   Component Value Date    PAP NIL 2019    PAP NIL 2017    PAP NIL 2015    )    HISTORY:  OB History    Para Term  AB Living   3 3 3 0 0 3   SAB TAB Ectopic Multiple Live Births   0 0 0 0 3      # Outcome Date GA Lbr Marcel/2nd Weight Sex Delivery Anes PTL Lv   3 Term 18 38w4d 12:44 / 00:02 2.6 kg (5 lb 11.7 oz) M Vag-Spont IV REGIONAL, Nitrous N PRISCILLA      Name: Roger      Apgar1: 9  Apgar5: 9   2 Term 16 38w4d 02:30 / 00:16 2.821 kg (6 lb 3.5 oz) F Vag-Spont INT, TOPICAL N PRISCILLA      Name: Laly      Apgar1: 9  Apgar5: 10   1 Term 12 40w0d  3.204 kg (7 lb 1 oz) F Vag-Spont   PRISCILLA      Name: Leena     Past Medical History:   Diagnosis Date     Chronic interstitial cystitis 2011      Tobacco use disorder 5/17/2012     Past Surgical History:   Procedure Laterality Date     CYSTOSCOPY       Family History   Problem Relation Age of Onset     Other - See Comments Paternal Grandmother         brain aneurysm     Cancer Maternal Grandmother         lung     Diabetes Maternal Grandmother      Heart Disease Other 70        myocardial infarction     Social History     Socioeconomic History     Marital status:      Spouse name: None     Number of children: None     Years of education: None     Highest education level: None   Occupational History     Occupation: [employer]   Social Needs     Financial resource strain: None     Food insecurity     Worry: None     Inability: None     Transportation needs     Medical: None     Non-medical: None   Tobacco Use     Smoking status: Current Every Day Smoker     Packs/day: 0.50     Types: Cigarettes     Smokeless tobacco: Never Used   Substance and Sexual Activity     Alcohol use: Yes     Alcohol/week: 0.0 standard drinks     Comment: occasional     Drug use: No     Sexual activity: Yes     Partners: Male   Lifestyle     Physical activity     Days per week: None     Minutes per session: None     Stress: None   Relationships     Social connections     Talks on phone: None     Gets together: None     Attends Temple service: None     Active member of club or organization: None     Attends meetings of clubs or organizations: None     Relationship status: None     Intimate partner violence     Fear of current or ex partner: None     Emotionally abused: None     Physically abused: None     Forced sexual activity: None   Other Topics Concern      Service No     Blood Transfusions Yes     Comment: Permits if needed     Caffeine Concern No     Occupational Exposure No     Hobby Hazards No     Sleep Concern No     Stress Concern No     Weight Concern No     Special Diet No     Back Care No     Exercise No     Bike Helmet Not Asked     Seat Belt Yes      "Self-Exams Yes     Parent/sibling w/ CABG, MI or angioplasty before 65F 55M? No   Social History Narrative     None       Current Outpatient Medications:      etonogestrel (IMPLANON/NEXPLANON) 68 MG IMPL, 1 each (68 mg) by Subdermal route continuous, Disp: , Rfl: 0     nicotine (NICODERM CQ) 21 MG/24HR 24 hr patch, Place 1 patch onto the skin every 24 hours, Disp: 28 patch, Rfl: 2     omeprazole (PRILOSEC) 20 MG DR capsule, Take 1 capsule (20 mg) by mouth daily, Disp: 30 capsule, Rfl: 1     Allergies   Allergen Reactions     Latex Rash       Past medical, surgical, social and family history were reviewed and updated in [facility].    ROS:    CONSTITUTIONAL:     NEGATIVE for fever, chills, change in weight  INTEGUMENTARY/SKIN:       NEGATIVE for worrisome moles or lesions.  White spots on back of neck.  EYES:     NEGATIVE for vision changes or irritation  ENT/MOUTH: NEGATIVE for ear, mouth and throat problems  RESP:     NEGATIVE for significant cough or SOB  CV:   NEGATIVE for chest pain, palpitations or peripheral edema  GI:     NEGATIVE for nausea, abdominal pain, heartburn, or change in bowel habits.  Reoccurring constipation  :   NEGATIVE for frequency, dysuria, hematuria, vaginal discharge, or incontinence.  Irregular bleeding with Nexplanon  MUSCULOSKELETAL:     NEGATIVE for significant arthralgias or myalgia  NEURO:      NEGATIVE for weakness, dizziness or paresthesias  ENDOCRINE:      NEGATIVE for temperature intolerance, skin/hair changes  PSYCHIATRIC:      NEGATIVE for changes in mood or affect.     EXAM:   /68 (BP Location: Left arm, Patient Position: Chair, Cuff Size: Adult Regular)   Ht 1.549 m (5' 1\")   Wt 78 kg (172 lb)   BMI 32.50 kg/m     BMI: Body mass index is 32.5 kg/m .  Constitutional: healthy, alert and no distress  Head: Normocephalic. No masses, lesions, tenderness or abnormalities  Neck: Neck supple. Trachea midline. No adenopathy. Thyroid symmetric, normal size.   Cardiovascular: RRR. "   Respiratory: lungs clear   Breast: Breasts reveal mild symmetric fibrocystic densities, but there are no dominant, discrete, fixed or suspicious masses found.  Gastrointestinal: Abdomen soft, non-tender, non-distended. No masses, organomegaly.  :  Vulva:  No external lesions, normal female hair distribution, no inguinal adenopathy.    Urethra:  Midline, non-tender, well supported, no discharge  Vagina:  Moist, pink, no abnormal discharge, no lesions  Cervix: clean Noted lower in pelvis  Uterus:  Normal size, non-tender, freely mobile  Ovaries:  No masses appreciated  Rectal Exam: deferred  Musculoskeletal: extremities normal  Skin: no suspicious lesions or rashes  Psychiatric: Affect appropriate, cooperative,mentation appears normal.     COUNSELING:   regular exercise  healthy diet/nutrition  Immunizations   contraception  family planning  Folic Acid Counseling   reports that she has been smoking cigarettes. She has been smoking about 0.50 packs per day. She has never used smokeless tobacco.  Tobacco Cessation Action Plan: tobacco cessation counseling and patches  Body mass index is 32.5 kg/m .  Weight management plan: healthy eating and exercise  FRAX Risk Assessment    ASSESSMENT:  30 year old female with satisfactory annual exam  Need of cervical cancer screening  Irregular bleeding with Nexplanon/Expires in January 2020  Contraception counseling  Feeling of possible uterine prolapse/ 2 degree  PLAN:   Pap with High Risk hpv  PT referral for pelvic floor strengthening r/t 2 degree uterine prolapse   Schedule appt for Nexplanon removal in January 2020      Return to office: 1 year for well woman care and as needed    Total visit greater than 30 minutes with 25 minutes spent discussing well woman care, health promotion, and disease prevention.    ANDREA Zaldivar, CNM

## 2020-09-03 ASSESSMENT — ANXIETY QUESTIONNAIRES: GAD7 TOTAL SCORE: 1

## 2020-09-08 LAB
COPATH REPORT: NORMAL
PAP: NORMAL

## 2020-09-09 LAB
FINAL DIAGNOSIS: NORMAL
HPV HR 12 DNA CVX QL NAA+PROBE: NEGATIVE
HPV16 DNA SPEC QL NAA+PROBE: NEGATIVE
HPV18 DNA SPEC QL NAA+PROBE: NEGATIVE
SPECIMEN DESCRIPTION: NORMAL
SPECIMEN SOURCE CVX/VAG CYTO: NORMAL

## 2020-09-17 ENCOUNTER — HOSPITAL ENCOUNTER (OUTPATIENT)
Dept: PHYSICAL THERAPY | Facility: HOSPITAL | Age: 30
Setting detail: THERAPIES SERIES
End: 2020-09-17
Attending: ADVANCED PRACTICE MIDWIFE
Payer: COMMERCIAL

## 2020-09-17 DIAGNOSIS — N81.89 PELVIC FLOOR WEAKNESS: ICD-10-CM

## 2020-09-17 PROCEDURE — 97162 PT EVAL MOD COMPLEX 30 MIN: CPT | Mod: GP

## 2020-09-17 PROCEDURE — 97530 THERAPEUTIC ACTIVITIES: CPT | Mod: GP

## 2020-09-17 NOTE — PROGRESS NOTES
09/17/20 0900   General Information   Type of Visit Initial OP Ortho PT Evaluation   Start of Care Date 09/17/20   Referring Physician Jamison Cuellar   Orders Evaluate and Treat   Date of Order 09/02/20   Certification Required? No   Medical Diagnosis Pelvic organ prolapse   Surgical/Medical history reviewed Yes   Precautions/Limitations no known precautions/limitations   Body Part(s)   Body Part(s) Pelvic Floor Dysfunction   Presentation and Etiology   Pertinent history of current problem (include personal factors and/or comorbidities that impact the POC) Pt has mainc/o's of feeling vaginal pressure, seeing a prolapse. Has since her 2 year old was born. Pt has3 children with youngest being 2 years old. Constipation started after 2 year old. Had diverticulitis/constipated. Now is takign kefir and currently not constipated. Has hx of constipation. Pt is alsohavingno pain. Feeling pressure vaginally. Unable to push out BM occas. Cant empty bladder all the way occas. 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  No pain with intercourse   Impairments   (no pain)   Current Level of Function   Current Community Support Family/friend caregiver   Patient role/employment history A. Employed  (seated job)   Living environment [facility]   Current equipment-Gait/Locomotion None   Fall Risk Screen   Have you fallen 2 or more times in the past year? No   Have you fallen and had an injury in the past year? No   Specific Questions   Specific Questions Pelvic Floor Dysfunction;Women's Health   Pelvic Floor Dysfunction Questions   Regular exercise Yes   Fluid intake-glasses/day (one glass/cup = 8oz 3-5   Caffeinated beverages-glasses/day 1   Alcoholic beverages - glasses/day 2x/week    Recent diet change? Yes   How long can you delay the need to urinate?  not long   How many times do you wake to urinate at night?   2   How often do you urinate during the day?   10-12   Can you  "stop the flow of urine when on the toilet?  Yes   Is the volume of urine passed usually  Medium   Do you have the sensation that you need to go to the toilet?  Yes   Do you empty your bladder frequently, before you experience the urge to pass urine?  Yes   Do you have \"triggers\" that make you feel you can't wait to go to the toilet?  Yes   Number of bladder infections last year?  0   Frequency of bowel movements:  3-5x/week    Consistency of stool?  Soft formed  (occas constipated, usually type 4)   Do you ignore the urge to defecate?  Yes  (bristol type 4 -Does strain 90% of the time. )   Women's Health Questions   Number of pregnancies  3   Number of vaginal deliveries  3   Weight of largest baby  7 pounds 1 ounces   (long deliveries, labor- lots of pushing. 1st baby hardest.)   Pelvic Floor Dysfunction Objective Findings   Pain-pelvic dysfunction   (denies pain with sex)   Observation pelvic organ prolapse 2+/3 with strain   Type of Storage Problem frequency;nocturia;stress incontinence  (strains approx 90% of the time)   Type of Emptying Problem strain to void;hesitancy;postvoid dribbling;constipation;fecal incontinence   Protection needed None   Power (MMT at Levator Ani) 3+   Endurance (Up to 10 seconds as long as still 50% power) 6   Repetitions (Contract 10 seconds or MVC, rest 4 seconds and count max number of reps) 8   Fast Twitch (Number of 1 second contractions can do in 10 seconds. Norm=7 reps) 7   Elevation (Able to lift posterior vaginal wall toward head and public bone) Present   Medications   (see medical chart)   Planned Therapy Interventions   Planned Therapy Interventions manual therapy;strengthening;stretching;ROM   Planned Therapy Interventions Comment HEP, education.   Planned Modality Interventions   Planned Modality Interventions Biofeedback;Electrical stimulation   Clinical Impression   Criteria for Skilled Therapeutic Interventions Met yes, treatment indicated   PT Diagnosis pelvic organ " prolapse, Non relaxing PFM   Influenced by the following impairments strain to empty bladder, post void dribbling, painful BM, constipation, occas difficulty feeling bowel urge.    Functional limitations due to impairments work, caring for 3 children, exercise,    Clinical Presentation Evolving/Changing   Clinical Presentation Rationale clin. judgement   Clinical Decision Making (Complexity) Moderate complexity   Therapy Frequency other (see comments)  (1-2x weekly )   Predicted Duration of Therapy Intervention (days/wks) 12 weeks   Risk & Benefits of therapy have been explained Yes   Patient, Family & other staff in agreement with plan of care Yes   Clinical Impression Comments Pt will benefit from PFM Strength training, education re dysfunction and manual therapy as indicated.    Education Assessment   Barriers to Learning No barriers   ORTHO GOALS   PT Ortho Eval Goals 2;1;3   Ortho Goal 1   Goal Identifier STG 1   Goal Description Pt will demonstrate indep HEP compliance   Target Date 10/29/20   Ortho Goal 2   Goal Identifier STG 2   Goal Description Pt will have reduced POP  by 1 grade through PT/HEP   Target Date 11/12/20   Ortho Goal 3   Goal Identifier LTG   Goal Description Pt will be indep in management of PFM dysfunction and constipation   Target Date 12/10/20   Total Evaluation Time   PT Eval, Moderate Complexity Minutes (86087) 35

## 2020-09-22 ENCOUNTER — HOSPITAL ENCOUNTER (OUTPATIENT)
Dept: PHYSICAL THERAPY | Facility: HOSPITAL | Age: 30
Setting detail: THERAPIES SERIES
End: 2020-09-22
Attending: ADVANCED PRACTICE MIDWIFE
Payer: COMMERCIAL

## 2020-09-22 PROCEDURE — 97530 THERAPEUTIC ACTIVITIES: CPT | Mod: GP

## 2020-09-22 PROCEDURE — 97110 THERAPEUTIC EXERCISES: CPT | Mod: GP

## 2020-09-25 ENCOUNTER — HOSPITAL ENCOUNTER (OUTPATIENT)
Dept: PHYSICAL THERAPY | Facility: HOSPITAL | Age: 30
Setting detail: THERAPIES SERIES
End: 2020-09-25
Attending: ADVANCED PRACTICE MIDWIFE
Payer: COMMERCIAL

## 2020-09-25 ENCOUNTER — HOSPITAL ENCOUNTER (OUTPATIENT)
Dept: PHYSICAL THERAPY | Facility: HOSPITAL | Age: 30
Setting detail: THERAPIES SERIES
End: 2020-09-25
Attending: FAMILY MEDICINE
Payer: COMMERCIAL

## 2020-09-25 PROCEDURE — 97140 MANUAL THERAPY 1/> REGIONS: CPT | Mod: GP

## 2020-09-25 PROCEDURE — 97530 THERAPEUTIC ACTIVITIES: CPT | Mod: GP

## 2020-09-25 PROCEDURE — 97110 THERAPEUTIC EXERCISES: CPT | Mod: GP

## 2020-09-25 NOTE — PROGRESS NOTES
09/25/20 0847   Signing Clinician's Name / Credentials   Signing clinician's name / credentials Mary Ann Christian DPT   Session Number   Session Number 3_BC/[place] of [place]. No limits   Subjective Report   Subjective Report Patient seen [id number] for C/O constipation and difficulty emptying bladder, d/t pelvic organ prolapse. Shared with Barbara Gil, PT. After her third of three children she knew something was wrong. The issues did not resolve after that birth. She has a birth control implanted in her arm. She has now had her menstrual period for over three weeks. When asked, patient stated no one has ever brought up the idea she could be anemic. Her periods do tend to be 10-14 days, but never three weeks.    Objective Measure 2   Objective Measure Somatic dysfunction   Details Bladder uterus tipped anteriorly and descended (19:8). Leg lengths equal, pelvis level, and SI jts equally mobile. Sacral and lumbar segments WFL of mobility and alignment.    Therapeutic Activity   Therapeutic Activities: dynamic activities to improve functional performance minutes (17028) 30   Skilled Intervention ther act   Patient Response good   Treatment Detail Discussed the importancer of 1. not breath-holding with position changes; 2. asking for blood work to determine if she is anemic d/t extended menstrual bleeding for over 3 weeks now; 3. checking kefir product she drinks to see if it is pasteurized, or if so, are cultures added after pasteurization; benefits of orange-yellow squash in diet to include carotenes   Progress Patient verbalized understanding   Manual Therapy   Manual Therapy: Mobilization, MFR, MLD, friction massage minutes (48862) 30   Skilled Intervention man ther   Patient Response good   Treatment Detail MFR direct in LLQ near midline   Progress Post tx bladder mobility WFL and ascended    Plan   Homework 1. avoid breath-holding with position changes   Plan for next session Check liver mobility. Ask about menstrual  bleeding. Manual therapy per re-eval   Comments   Comments Findings consistent with hypomobile and descended bladder-uterus complex   Total Session Time   Timed Code Treatment Minutes 60   Total Treatment Time (sum of timed and untimed services) 60     LIGIA DyeT

## 2020-09-28 ENCOUNTER — TELEPHONE (OUTPATIENT)
Dept: OBGYN | Facility: OTHER | Age: 30
End: 2020-09-28

## 2020-09-28 DIAGNOSIS — N92.6 ABNORMAL MENSTRUAL CYCLE: Primary | ICD-10-CM

## 2020-09-28 NOTE — TELEPHONE ENCOUNTER
Pt called requesting a lab be placed for her hemoglobin to be checked. She has had her period since 9/2/2020. Lab OK-ed to be ordered with Jamison and lab only appt scheduled.

## 2020-10-02 ENCOUNTER — HOSPITAL ENCOUNTER (OUTPATIENT)
Dept: PHYSICAL THERAPY | Facility: HOSPITAL | Age: 30
Setting detail: THERAPIES SERIES
End: 2020-10-02
Attending: ADVANCED PRACTICE MIDWIFE
Payer: COMMERCIAL

## 2020-10-02 DIAGNOSIS — N92.6 ABNORMAL MENSTRUAL CYCLE: ICD-10-CM

## 2020-10-02 LAB — HGB BLD-MCNC: 14.9 G/DL (ref 11.7–15.7)

## 2020-10-02 PROCEDURE — 97530 THERAPEUTIC ACTIVITIES: CPT | Mod: GP

## 2020-10-02 PROCEDURE — 85018 HEMOGLOBIN: CPT | Performed by: ADVANCED PRACTICE MIDWIFE

## 2020-10-02 PROCEDURE — 36415 COLL VENOUS BLD VENIPUNCTURE: CPT | Performed by: ADVANCED PRACTICE MIDWIFE

## 2020-10-02 PROCEDURE — 97140 MANUAL THERAPY 1/> REGIONS: CPT | Mod: GP

## 2020-10-05 ENCOUNTER — HOSPITAL ENCOUNTER (OUTPATIENT)
Dept: PHYSICAL THERAPY | Facility: HOSPITAL | Age: 30
Setting detail: THERAPIES SERIES
End: 2020-10-05
Attending: ADVANCED PRACTICE MIDWIFE
Payer: COMMERCIAL

## 2020-10-05 PROCEDURE — 97140 MANUAL THERAPY 1/> REGIONS: CPT | Mod: GP

## 2020-10-06 ENCOUNTER — HOSPITAL ENCOUNTER (OUTPATIENT)
Dept: PHYSICAL THERAPY | Facility: HOSPITAL | Age: 30
Setting detail: THERAPIES SERIES
End: 2020-10-06
Attending: ADVANCED PRACTICE MIDWIFE
Payer: COMMERCIAL

## 2020-10-06 PROCEDURE — 97110 THERAPEUTIC EXERCISES: CPT | Mod: GP

## 2020-10-13 ENCOUNTER — HOSPITAL ENCOUNTER (OUTPATIENT)
Dept: PHYSICAL THERAPY | Facility: HOSPITAL | Age: 30
Setting detail: THERAPIES SERIES
End: 2020-10-13
Attending: ADVANCED PRACTICE MIDWIFE
Payer: COMMERCIAL

## 2020-10-13 PROCEDURE — 97110 THERAPEUTIC EXERCISES: CPT | Mod: GP

## 2020-10-16 ENCOUNTER — HOSPITAL ENCOUNTER (OUTPATIENT)
Dept: PHYSICAL THERAPY | Facility: HOSPITAL | Age: 30
Setting detail: THERAPIES SERIES
End: 2020-10-16
Attending: ADVANCED PRACTICE MIDWIFE
Payer: COMMERCIAL

## 2020-10-16 PROCEDURE — 97140 MANUAL THERAPY 1/> REGIONS: CPT | Mod: GP

## 2020-10-27 ENCOUNTER — HOSPITAL ENCOUNTER (OUTPATIENT)
Dept: PHYSICAL THERAPY | Facility: HOSPITAL | Age: 30
Setting detail: THERAPIES SERIES
End: 2020-10-27
Attending: ADVANCED PRACTICE MIDWIFE
Payer: COMMERCIAL

## 2020-10-27 PROCEDURE — 97110 THERAPEUTIC EXERCISES: CPT | Mod: GP

## 2020-10-27 PROCEDURE — 97140 MANUAL THERAPY 1/> REGIONS: CPT | Mod: GP

## 2020-10-29 ENCOUNTER — HOSPITAL ENCOUNTER (OUTPATIENT)
Dept: PHYSICAL THERAPY | Facility: HOSPITAL | Age: 30
Setting detail: THERAPIES SERIES
End: 2020-10-29
Attending: ADVANCED PRACTICE MIDWIFE
Payer: COMMERCIAL

## 2020-10-29 PROCEDURE — 999N000214 HC STATISTIC PT OUTPT VISIT

## 2020-11-03 ENCOUNTER — HOSPITAL ENCOUNTER (OUTPATIENT)
Dept: PHYSICAL THERAPY | Facility: HOSPITAL | Age: 30
Setting detail: THERAPIES SERIES
End: 2020-11-03
Attending: ADVANCED PRACTICE MIDWIFE
Payer: COMMERCIAL

## 2020-11-03 PROCEDURE — 97110 THERAPEUTIC EXERCISES: CPT | Mod: GP

## 2020-11-17 ENCOUNTER — HOSPITAL ENCOUNTER (OUTPATIENT)
Dept: PHYSICAL THERAPY | Facility: HOSPITAL | Age: 30
Setting detail: THERAPIES SERIES
End: 2020-11-17
Attending: ADVANCED PRACTICE MIDWIFE
Payer: COMMERCIAL

## 2020-11-17 PROCEDURE — 97110 THERAPEUTIC EXERCISES: CPT | Mod: GP

## 2020-11-24 ENCOUNTER — HOSPITAL ENCOUNTER (OUTPATIENT)
Dept: PHYSICAL THERAPY | Facility: HOSPITAL | Age: 30
Setting detail: THERAPIES SERIES
End: 2020-11-24
Attending: ADVANCED PRACTICE MIDWIFE
Payer: COMMERCIAL

## 2020-11-24 PROCEDURE — 97110 THERAPEUTIC EXERCISES: CPT | Mod: GP

## 2020-12-01 ENCOUNTER — HOSPITAL ENCOUNTER (OUTPATIENT)
Dept: PHYSICAL THERAPY | Facility: HOSPITAL | Age: 30
Setting detail: THERAPIES SERIES
End: 2020-12-01
Attending: ADVANCED PRACTICE MIDWIFE
Payer: COMMERCIAL

## 2020-12-01 PROCEDURE — 97110 THERAPEUTIC EXERCISES: CPT | Mod: GP

## 2020-12-07 ENCOUNTER — OFFICE VISIT (OUTPATIENT)
Dept: OBGYN | Facility: OTHER | Age: 30
End: 2020-12-07
Attending: ADVANCED PRACTICE MIDWIFE
Payer: COMMERCIAL

## 2020-12-07 VITALS
DIASTOLIC BLOOD PRESSURE: 65 MMHG | BODY MASS INDEX: 32.47 KG/M2 | WEIGHT: 172 LBS | SYSTOLIC BLOOD PRESSURE: 112 MMHG | HEIGHT: 61 IN

## 2020-12-07 DIAGNOSIS — Z30.46 ENCOUNTER FOR NEXPLANON REMOVAL: ICD-10-CM

## 2020-12-07 DIAGNOSIS — Z30.430 ENCOUNTER FOR INSERTION OF INTRAUTERINE CONTRACEPTIVE DEVICE (IUD): Primary | ICD-10-CM

## 2020-12-07 DIAGNOSIS — Z71.6 ENCOUNTER FOR TOBACCO USE CESSATION COUNSELING: ICD-10-CM

## 2020-12-07 LAB
HCG UR QL: NEGATIVE
SPECIMEN SOURCE: NORMAL
WET PREP SPEC: NORMAL

## 2020-12-07 PROCEDURE — 58300 INSERT INTRAUTERINE DEVICE: CPT | Performed by: ADVANCED PRACTICE MIDWIFE

## 2020-12-07 PROCEDURE — 11982 REMOVE DRUG IMPLANT DEVICE: CPT | Performed by: ADVANCED PRACTICE MIDWIFE

## 2020-12-07 PROCEDURE — 87491 CHLMYD TRACH DNA AMP PROBE: CPT | Performed by: ADVANCED PRACTICE MIDWIFE

## 2020-12-07 PROCEDURE — 87210 SMEAR WET MOUNT SALINE/INK: CPT | Performed by: ADVANCED PRACTICE MIDWIFE

## 2020-12-07 PROCEDURE — 87591 N.GONORRHOEAE DNA AMP PROB: CPT | Performed by: ADVANCED PRACTICE MIDWIFE

## 2020-12-07 PROCEDURE — 81025 URINE PREGNANCY TEST: CPT | Performed by: ADVANCED PRACTICE MIDWIFE

## 2020-12-07 ASSESSMENT — MIFFLIN-ST. JEOR: SCORE: 1437.57

## 2020-12-07 ASSESSMENT — PAIN SCALES - GENERAL: PAINLEVEL: NO PAIN (0)

## 2020-12-07 NOTE — PATIENT INSTRUCTIONS
Return to office in 3 weeks for follow up care and as needed.    Thank you for allowing Jamison MENDEZ CNM and our OB team to participate in your care.  If you have a scheduling or an appointment question please contact [facility] Coordinator at their direct line [phone].   ALL nursing questions or concerns can be directed to your OB nurse at: [phone] Destiny Muse/Paty

## 2020-12-07 NOTE — PROGRESS NOTES
"Procedure:  Nexplanon removal  After obtaining consent from the patient the nexplanon was removed.  Her arm was prepped copiously with betadine and 1% lidocaine ~ 1 cc was injected into her arm over the nexplanon marisol.  A 0.8 cm incision was made over the previous scar and the nexplanon marisol was removed without difficulty. Pressure was placed over the incision and minimal bleeding was noted.  It was covered with triple antibiotic and 2 x 2 guaze with tegaderm and the arm was bandaged with a kerlex guaze.  She will leave that on for at least 24 hrs.    She tolerated the procedure well.  No complications.       CC:  IUD insertion for family planning or bleeding control  HPI:  Arabella Desai is a 30 year old female No LMP recorded.  No other c/o.  She is here for an IUD insertion. Patient has verbalized understanding of risks and benefits and has signed the consent form.          Prior ectopic n  Prior CT n    Allergies: Latex    EXAM:  Blood pressure 112/65, height 1.549 m (5' 1\"), weight 78 kg (172 lb), not currently breastfeeding.  General - pleasant female in no acute distress.  Pelvic - External Genitalia: normal adult female; Bartholin,urethral and [place]: within normal limits,   Vagina: well rugated, no discharge,   Cervix: no lesions or Cervical Motion Tenderness,   Uterus: firm, normal sized and nontender, Adnexae: no masses or tenderness.    PROCEDURE:  After informed consent was obtained from the patient, a speculum was placed in the vagina to visualized the cervix  Tenaculum was placed at the 12 o'clock.  The Mirena  IUD was then placed in the usual fashion.  Strings were clipped about 2-3 cm from the cervical os.  Tenaculum was removed and cervix was hemostatic. There were no complications. The patient tolerated the procedure well.    6/10 went to 0/10 immediately after.    ASSESSMENT:  Contraception management  Nexplanon in left arm  Request IUD  Negative hCG      PLAN:  Nexplanon removal  IUD " insertion  Urine hCG  Wet prep  GC/CT    The patient should feel for the IUD strings after her next menses.  If unable to locate them, she should return to clinic for a speculum examination for confirmation that the IUD is in place. Bleeding pattern of this particular IUD was discussed with the patient. She is aware that the IUD will need to be removed in 5 years or PRN.  She is to return in 3 wks  to clinic and for her next annual or PRN.      .  ANDREA Zaldivar, ABENAM

## 2020-12-07 NOTE — NURSING NOTE
"Chief Complaint   Patient presents with     Contraception     nexplanon removal and IUD placement       Initial /65 (BP Location: Left arm, Patient Position: Chair, Cuff Size: Adult Regular)   Ht 1.549 m (5' 1\")   Wt 78 kg (172 lb)   BMI 32.50 kg/m   Estimated body mass index is 32.5 kg/m  as calculated from the following:    Height as of this encounter: 1.549 m (5' 1\").    Weight as of this encounter: 78 kg (172 lb).  Medication Reconciliation: complete  Almaz Gee LPN    "

## 2020-12-08 LAB
C TRACH DNA SPEC QL NAA+PROBE: NOT DETECTED
N GONORRHOEA DNA SPEC QL NAA+PROBE: NOT DETECTED
SPECIMEN SOURCE: NORMAL

## 2020-12-14 ENCOUNTER — HEALTH MAINTENANCE LETTER (OUTPATIENT)
Age: 30
End: 2020-12-14

## 2020-12-18 ENCOUNTER — HOSPITAL ENCOUNTER (OUTPATIENT)
Dept: PHYSICAL THERAPY | Facility: HOSPITAL | Age: 30
Setting detail: THERAPIES SERIES
End: 2020-12-18
Attending: ADVANCED PRACTICE MIDWIFE
Payer: COMMERCIAL

## 2020-12-18 PROCEDURE — 97110 THERAPEUTIC EXERCISES: CPT | Mod: GP

## 2020-12-22 ENCOUNTER — HOSPITAL ENCOUNTER (OUTPATIENT)
Dept: PHYSICAL THERAPY | Facility: HOSPITAL | Age: 30
Setting detail: THERAPIES SERIES
End: 2020-12-22
Attending: ADVANCED PRACTICE MIDWIFE
Payer: COMMERCIAL

## 2020-12-22 PROCEDURE — 97110 THERAPEUTIC EXERCISES: CPT | Mod: GP

## 2020-12-30 ENCOUNTER — OFFICE VISIT (OUTPATIENT)
Dept: OBGYN | Facility: OTHER | Age: 30
End: 2020-12-30
Attending: OBSTETRICS & GYNECOLOGY
Payer: COMMERCIAL

## 2020-12-30 VITALS
OXYGEN SATURATION: 98 % | WEIGHT: 172 LBS | SYSTOLIC BLOOD PRESSURE: 120 MMHG | BODY MASS INDEX: 32.47 KG/M2 | HEIGHT: 61 IN | DIASTOLIC BLOOD PRESSURE: 71 MMHG | HEART RATE: 98 BPM

## 2020-12-30 DIAGNOSIS — Z30.430 ENCOUNTER FOR IUD INSERTION: Primary | ICD-10-CM

## 2020-12-30 DIAGNOSIS — Z30.430 ENCOUNTER FOR INSERTION OF INTRAUTERINE CONTRACEPTIVE DEVICE (IUD): ICD-10-CM

## 2020-12-30 LAB — HCG UR QL: NEGATIVE

## 2020-12-30 PROCEDURE — 58300 INSERT INTRAUTERINE DEVICE: CPT | Performed by: OBSTETRICS & GYNECOLOGY

## 2020-12-30 PROCEDURE — 81025 URINE PREGNANCY TEST: CPT | Performed by: OBSTETRICS & GYNECOLOGY

## 2020-12-30 ASSESSMENT — PAIN SCALES - GENERAL: PAINLEVEL: NO PAIN (0)

## 2020-12-30 ASSESSMENT — MIFFLIN-ST. JEOR: SCORE: 1437.57

## 2020-12-30 NOTE — NURSING NOTE
"Chief Complaint   Patient presents with     IUD       Initial /71 (BP Location: Left arm, Cuff Size: Adult Regular)   Pulse 98   Ht 1.549 m (5' 1\")   Wt 78 kg (172 lb)   SpO2 98%   BMI 32.50 kg/m   Estimated body mass index is 32.5 kg/m  as calculated from the following:    Height as of this encounter: 1.549 m (5' 1\").    Weight as of this encounter: 78 kg (172 lb).  Medication Reconciliation: complete  Peyton Borrero LPN  "

## 2020-12-30 NOTE — PROGRESS NOTES
"CLINIC VISIT FR FAMILY PLANNING/ IUD PLACEMENT    SUBJECTIVE=    PATIENT HAD IUD PLACED 3 WEEKS AGO AND IT CAME OUT WITH A TAMPON   SHE HAS SOME UTERINE PROLAPSE AND HAS RECEIVED THERAPY FOR IT AND NOW IS   DOING MUCH BETTER.   SHE JUST STARTED HER MENSTRUAL FLOW 3 DAYS AGO AND IS DOING WELL   SHE DENIES UTI SX'S, FEVER / CHILLS / COUGH / NAUSEA / EMESIS / DIARRHEA / SOB OR   DIZZINESS    OBJECTIVE=    Blood pressure 120/71, pulse 98, height 1.549 m (5' 1\"), weight 78 kg (172 lb), SpO2 98 %, not currently breastfeeding.    UPT= NEGATIVE    FOCUSED EXAM   NORMAL EXTERNAL FEMALE GENITALIA   VAG= MOIST WELL RUGATED   CX= MULTIPAROUS- NO CMT- NO DISCHARGE   UT= 8 CM- MOBILE, NON-TENDER   ADNEXA= NO MASSES AND NONTENDER    PROCEDURE;   SPECULUM PLACED    IODINE PREP   TENACULUM TO ANTERIOR LIP   UTERUS SOUNDED TO  9 CM   IUD PLACED TO 7 CM   String cut to 1 cm   Scant spotting   Well tolerated    ASSESSMENT= 30 Y.O G 3  HERE FOR IUD PLACEMENT WITH GOOD RETENTION.    1. CONSENTED AFTER DISCUSSION OF R/B/A/  2. SOUNDED TO 9CM; placed to 7, STRING CUT TO 1CM  3. PLACED AND RETAINED  "

## 2021-01-06 ENCOUNTER — OFFICE VISIT (OUTPATIENT)
Dept: OBGYN | Facility: OTHER | Age: 31
End: 2021-01-06
Attending: ADVANCED PRACTICE MIDWIFE
Payer: COMMERCIAL

## 2021-01-06 VITALS
WEIGHT: 172 LBS | HEIGHT: 61 IN | SYSTOLIC BLOOD PRESSURE: 117 MMHG | BODY MASS INDEX: 32.47 KG/M2 | DIASTOLIC BLOOD PRESSURE: 70 MMHG

## 2021-01-06 DIAGNOSIS — Z30.431 ENCOUNTER FOR ROUTINE CHECKING OF INTRAUTERINE CONTRACEPTIVE DEVICE (IUD): Primary | ICD-10-CM

## 2021-01-06 PROCEDURE — 99213 OFFICE O/P EST LOW 20 MIN: CPT | Performed by: ADVANCED PRACTICE MIDWIFE

## 2021-01-06 ASSESSMENT — MIFFLIN-ST. JEOR: SCORE: 1437.57

## 2021-01-06 ASSESSMENT — PAIN SCALES - GENERAL: PAINLEVEL: NO PAIN (0)

## 2021-01-06 NOTE — PROGRESS NOTES
"Arabella Desai is a 30 year old female  here today for contraception management with routine IUD check @ 3 weeks post insertion.    Pt denies pain or abnormal bleeding.  Denies pain with sexual intercourse.        O:   /70 (BP Location: Left arm, Patient Position: Chair, Cuff Size: Adult Regular)   Ht 1.549 m (5' 1\")   Wt 78 kg (172 lb)   BMI 32.50 kg/m     Pleasant without acute distress .  Pelvic:  Vagina and vulva are normal;  no discharge is noted.    Cervix: normal without lesions. IUD strings visible   Uterus:  mobile, normal in size and shape without tenderness.  Adnexa: without masses or tenderness.      A:    Contraception management  IUD strings visualized    P:    Pelvic exam  Routine IUD check    Total visit greater than 15 minutes with 10 minutes spent face to face counseling this patient on  IUD risks, benefits, side effects, effectiveness, abnormal bleeding or pain.    Jamison Cuellar, ANDREA, CNM  "

## 2021-01-06 NOTE — NURSING NOTE
"Chief Complaint   Patient presents with     IUD     IUD check        Initial /70 (BP Location: Left arm, Patient Position: Chair, Cuff Size: Adult Regular)   Ht 1.549 m (5' 1\")   Wt 78 kg (172 lb)   BMI 32.50 kg/m   Estimated body mass index is 32.5 kg/m  as calculated from the following:    Height as of this encounter: 1.549 m (5' 1\").    Weight as of this encounter: 78 kg (172 lb).  Medication Reconciliation: complete  Almaz Gee LPN    "

## 2021-01-06 NOTE — PATIENT INSTRUCTIONS
Return to office in one year for well woman care and as needed.    Thank you for allowing Jamison MENDEZ CNM and our OB team to participate in your care.  If you have a scheduling or an appointment question please contact Karmen [facility] Coordinator at their direct line [phone].   ALL nursing questions or concerns can be directed to your OB nurse at: [phone] Destiny Muse/Paty

## 2021-01-08 ENCOUNTER — HOSPITAL ENCOUNTER (OUTPATIENT)
Dept: PHYSICAL THERAPY | Facility: HOSPITAL | Age: 31
Setting detail: THERAPIES SERIES
End: 2021-01-08
Attending: ADVANCED PRACTICE MIDWIFE
Payer: COMMERCIAL

## 2021-01-08 PROCEDURE — 97110 THERAPEUTIC EXERCISES: CPT | Mod: GP

## 2021-04-01 ENCOUNTER — IMMUNIZATION (OUTPATIENT)
Dept: FAMILY MEDICINE | Facility: OTHER | Age: 31
End: 2021-04-01
Attending: FAMILY MEDICINE
Payer: COMMERCIAL

## 2021-04-01 PROCEDURE — 0031A PR COVID VAC JANSSEN AD26 0.5ML: CPT

## 2021-04-01 PROCEDURE — 91303 PR COVID VAC JANSSEN AD26 0.5ML: CPT

## 2021-06-28 ENCOUNTER — MYC MEDICAL ADVICE (OUTPATIENT)
Dept: FAMILY MEDICINE | Facility: OTHER | Age: 31
End: 2021-06-28

## 2021-06-30 NOTE — TELEPHONE ENCOUNTER
Updated patient about needing appt. Patient stated she was at work and will call later to schedule.  Hanh Hare LPN

## 2021-08-17 ENCOUNTER — NURSE TRIAGE (OUTPATIENT)
Dept: FAMILY MEDICINE | Facility: OTHER | Age: 31
End: 2021-08-17

## 2021-08-17 NOTE — TELEPHONE ENCOUNTER
"Patient is going to , her provider is not in today and she refused to an appointment tomorrow or with anyone else.    Reason for Disposition    Patient wants to be seen    Answer Assessment - Initial Assessment Questions  1. ONSET: \"When did the cough begin?\"       6 days ago  2. SEVERITY: \"How bad is the cough today?\"       Keeping her up at night  3. RESPIRATORY DISTRESS: \"Describe your breathing.\"       ok  4. FEVER: \"Do you have a fever?\" If so, ask: \"What is your temperature, how was it measured, and when did it start?\"      no  5. HEMOPTYSIS: \"Are you coughing up any blood?\" If so ask: \"How much?\" (flecks, streaks, tablespoons, etc.)      no  6. TREATMENT: \"What have you done so far to treat the cough?\" (e.g., meds, fluids, humidifier)      Allergy meds nasal rinses  7. CARDIAC HISTORY: \"Do you have any history of heart disease?\" (e.g., heart attack, congestive heart failure)       no  8. LUNG HISTORY: \"Do you have any history of lung disease?\"  (e.g., pulmonary embolus, asthma, emphysema)      no  9. PE RISK FACTORS: \"Do you have a history of blood clots?\" (or: recent major surgery, recent prolonged travel, bedridden)      no  10. OTHER SYMPTOMS: \"Do you have any other symptoms? (e.g., runny nose, wheezing, chest pain)        Heavy chest runny nose  11. PREGNANCY: \"Is there any chance you are pregnant?\" \"When was your last menstrual period?\"        no  12. TRAVEL: \"Have you traveled out of the country in the last month?\" (e.g., travel history, exposures)        no    Protocols used: COUGH-A-[place]      "

## 2021-08-19 ENCOUNTER — HOSPITAL ENCOUNTER (EMERGENCY)
Facility: HOSPITAL | Age: 31
Discharge: HOME | End: 2021-08-19
Attending: NURSE PRACTITIONER | Admitting: NURSE PRACTITIONER
Payer: COMMERCIAL

## 2021-08-19 ENCOUNTER — APPOINTMENT (OUTPATIENT)
Dept: GENERAL RADIOLOGY | Facility: HOSPITAL | Age: 31
End: 2021-08-19
Attending: NURSE PRACTITIONER
Payer: COMMERCIAL

## 2021-08-19 VITALS
RESPIRATION RATE: 16 BRPM | OXYGEN SATURATION: 98 % | SYSTOLIC BLOOD PRESSURE: 120 MMHG | DIASTOLIC BLOOD PRESSURE: 82 MMHG | TEMPERATURE: 98.5 F | HEART RATE: 104 BPM

## 2021-08-19 DIAGNOSIS — J06.9 VIRAL URI WITH COUGH: Primary | ICD-10-CM

## 2021-08-19 LAB — SARS-COV-2 RNA RESP QL NAA+PROBE: NEGATIVE

## 2021-08-19 PROCEDURE — 99213 OFFICE O/P EST LOW 20 MIN: CPT | Performed by: NURSE PRACTITIONER

## 2021-08-19 PROCEDURE — C9803 HOPD COVID-19 SPEC COLLECT: HCPCS

## 2021-08-19 PROCEDURE — 71045 X-RAY EXAM CHEST 1 VIEW: CPT

## 2021-08-19 PROCEDURE — G0463 HOSPITAL OUTPT CLINIC VISIT: HCPCS

## 2021-08-19 PROCEDURE — U0003 INFECTIOUS AGENT DETECTION BY NUCLEIC ACID (DNA OR RNA); SEVERE ACUTE RESPIRATORY SYNDROME CORONAVIRUS 2 (SARS-COV-2) (CORONAVIRUS DISEASE [COVID-19]), AMPLIFIED PROBE TECHNIQUE, MAKING USE OF HIGH THROUGHPUT TECHNOLOGIES AS DESCRIBED BY CMS-2020-01-R: HCPCS | Performed by: NURSE PRACTITIONER

## 2021-08-19 RX ORDER — BENZONATATE 100 MG/1
100 CAPSULE ORAL 3 TIMES DAILY PRN
Qty: 12 CAPSULE | Refills: 0 | Status: SHIPPED | OUTPATIENT
Start: 2021-08-19 | End: 2021-09-13

## 2021-08-19 ASSESSMENT — ENCOUNTER SYMPTOMS
EYE PAIN: 0
PSYCHIATRIC NEGATIVE: 1
NAUSEA: 0
FATIGUE: 1
EYE REDNESS: 0
DIARRHEA: 0
WHEEZING: 1
FEVER: 0
CHILLS: 0
TROUBLE SWALLOWING: 0
PALPITATIONS: 0
SINUS PAIN: 1
VOMITING: 0
MYALGIAS: 0
ABDOMINAL PAIN: 0
SORE THROAT: 1
HEADACHES: 1
SINUS PRESSURE: 1
SHORTNESS OF BREATH: 0
RHINORRHEA: 0
EYE ITCHING: 0
COUGH: 1

## 2021-08-20 NOTE — DISCHARGE INSTRUCTIONS
Benzonatate as needed for cough    Symptomatic treatments recommended.  -Discussed that antibiotics would not help symptoms of viral URI. Education provided on symptoms of secondary bacterial infection such as new fever, chills, rigors, shortness of breath, increased work of breathing, that can occur with viral URI and need for further evaluation, if they occur.   - Ensure you are staying hydrated by drinking plenty of fluids or eating foods such as popsicles, jello, pudding.  - Honey can be soothing for sore throat  - Warm salt water gurgles can help soothe sore throat  - Rest  - Humidifier can help with congestion and help keep mucus membranes such as throat and nose from drying out.  - Sleeping slightly propped up can help with congestion and postnasal drainage that can worsen cough at bedtime.  - As long as you have never been told to take Tylenol and/or Ibuprofen you can use them to manage fever and body aches per package instructions  Make sure you eat when you take ibuprofen to avoid stomach upset.  - OTC cough medications per package instructions to help with cough. Check to see if the cough/cold medication already has acetaminophen (Tylenol) in it. If it does avoid taking additional Tylenol.  - If sudden onset of new fever, worsening symptoms return for further evaluation.  - OTC nasal steroid such as Flonase can help decrease sinus inflammation to help with congestion.  - Education provided on symptoms of post-viral bacterial infections including ear infection and pneumonia. This would require re-evaluation for treatment.    Follow up with primary care provider or return to urgent care/ED with any worsening in condition or additional concerns.

## 2021-08-20 NOTE — ED TRIAGE NOTES
Pt states she had sinus pressure/pain last week. Started Flonase and did some nasal rinses and states her sinuses feel better. States came to day because of her cough.

## 2021-08-20 NOTE — ED PROVIDER NOTES
History     Chief Complaint   Patient presents with     Cough     HPI  Arabella Desai is a 31 year old female who arrived to urgent care (ambulatory) with complaints of fatigue, congestion, sinus pain and pressure, sore throat, cough and headache.  Patient states she did a sinus rinse at home which relieved sinus pain and pressure and congestion.  Patient has seasonal allergies and takes Flonase.  Current smoker 1/2 PPD.  No asthma history.  No history of COVID.  Had JJ COVID Vaccine completed on 4/1/2021. Denies fever, chills, nausea, vomiting, diarrhea, shortness of breath and chest pain.  Patient states she has been wheezing at times.  Staying hydrated.  No known sick contact.  No other concerns.    Allergies:  Allergies   Allergen Reactions     Seasonal Allergies      Latex Rash       Problem List:    Patient Active Problem List    Diagnosis Date Noted     Duodenal ulcer due to Helicobacter pylori 06/01/2020     Priority: Medium     Slow transit constipation 06/24/2019     Priority: Medium     Other acute gastritis without hemorrhage 06/24/2019     Priority: Medium     Chronic cough 11/08/2017     Priority: Medium     Glucose intolerance of pregnancy 11/08/2017     Priority: Medium     EFW 38 wks       Chronic seasonal allergic rhinitis due to pollen 08/08/2017     Priority: Medium     Encounter for birth control 07/27/2016     Priority: Medium     History of depression 12/30/2015     Priority: Medium     Grief and postpartum. Patient believes pp depression may have been associated with breast feeding aggression from nurses       Need for influenza vaccination 12/02/2015     Priority: Medium     Defers 11/8/17         Proteinuria 12/02/2015     Priority: Medium     Tobacco use disorder 05/17/2012     Priority: Medium     Pneumovax done 7/10/17          Past Medical History:    Past Medical History:   Diagnosis Date     Chronic interstitial cystitis 8/12/2011     Supervision of other normal pregnancy,  antepartum 2015      (spontaneous vaginal delivery) 2016     Tobacco use disorder 2012       Past Surgical History:    Past Surgical History:   Procedure Laterality Date     CYSTOSCOPY         Family History:    Family History   Problem Relation Age of Onset     Other - See Comments Paternal Grandmother         brain aneurysm     Cancer Maternal Grandmother         lung     Diabetes Maternal Grandmother      Heart Disease Other 70        myocardial infarction       Social History:  Marital Status:   [2]  Social History     Tobacco Use     Smoking status: Current Every Day Smoker     Packs/day: 0.50     Types: Cigarettes     Smokeless tobacco: Never Used     Tobacco comment: Has nicotine patches/ working on it   Substance Use Topics     Alcohol use: Yes     Alcohol/week: 0.0 standard drinks     Comment: occasional     Drug use: No        Medications:    benzonatate (TESSALON) 100 MG capsule  famotidine (PEPCID) 20 MG tablet  levonorgestrel (MIRENA) 20 MCG/24HR IUD  nicotine (NICODERM CQ) 21 MG/24HR 24 hr patch  omeprazole (PRILOSEC) 20 MG DR capsule      Review of Systems   Constitutional: Positive for fatigue. Negative for chills and fever.   HENT: Positive for congestion (resolved), sinus pressure (resolved), sinus pain (resolved) and sore throat. Negative for ear pain, rhinorrhea and trouble swallowing.    Eyes: Negative for pain, redness and itching.   Respiratory: Positive for cough and wheezing. Negative for shortness of breath.    Cardiovascular: Negative for chest pain and palpitations.   Gastrointestinal: Negative for abdominal pain, diarrhea, nausea and vomiting.   Musculoskeletal: Negative for gait problem and myalgias.   Skin: Negative for rash.   Neurological: Positive for headaches.   Psychiatric/Behavioral: Negative.      Physical Exam   BP: 120/82  Pulse: 104  Temp: 98.5  F (36.9  C)  Resp: 16  SpO2: 98 %  AP: 92    Physical Exam  Vitals and nursing note reviewed.    Constitutional:       General: She is not in acute distress.     Appearance: She is not ill-appearing or toxic-appearing.   HENT:      Head: Normocephalic.      Right Ear: Tympanic membrane, ear canal and external ear normal.      Left Ear: Tympanic membrane, ear canal and external ear normal.      Nose: Nose normal.      Right Sinus: No maxillary sinus tenderness or frontal sinus tenderness.      Left Sinus: No maxillary sinus tenderness or frontal sinus tenderness.      Mouth/Throat:      Mouth: Mucous membranes are moist.      Pharynx: Oropharynx is clear. No oropharyngeal exudate or posterior oropharyngeal erythema.   Eyes:      Extraocular Movements: Extraocular movements intact.      Conjunctiva/sclera: Conjunctivae normal.      Pupils: Pupils are equal, round, and reactive to light.   Cardiovascular:      Rate and Rhythm: Normal rate and regular rhythm.      Pulses: Normal pulses.      Heart sounds: Normal heart sounds.   Pulmonary:      Effort: Pulmonary effort is normal.      Breath sounds: Wheezing present.   Musculoskeletal:      Cervical back: Normal range of motion and neck supple. No rigidity or tenderness.   Lymphadenopathy:      Cervical: No cervical adenopathy.   Skin:     General: Skin is warm and dry.   Neurological:      Mental Status: She is alert.   Psychiatric:         Mood and Affect: Mood normal.       ED Course     Results for orders placed or performed during the hospital encounter of 08/19/21 (from the past 24 hour(s))   Symptomatic COVID-19 Virus (Coronavirus) by PCR Nasopharyngeal    Specimen: Nasopharyngeal; Swab   Result Value Ref Range    SARS CoV2 PCR Negative Negative    Narrative    Testing was performed using the Xpert Xpress SARS-CoV-2 Assay on the   Cepheid Gene-Xpert Instrument Systems. Additional information about   this Emergency Use Authorization (EUA) assay can be found via the Lab   Guide. This test should be ordered for the detection of SARS-CoV-2 in   individuals who  meet SARS-CoV-2 clinical and/or epidemiological   criteria. Test performance is unknown in asymptomatic patients. This   test is for in vitro diagnostic use under the FDA EUA for   laboratories certified under CLIA to perform high complexity testing.   This test has not been FDA cleared or approved. A negative result   does not rule out the presence of PCR inhibitors in the specimen or   target RNA in concentration below the limit of detection for the   assay. The possibility of a false negative should be considered if   the patient's recent exposure or clinical presentation suggests   COVID-19. This test was validated by [facility]. This laboratory is certified under the Clinical Laboratory Improve  ment Amendments (CLIA) as qualified to perform high complexity testing.   XR Chest Port 1 View    Narrative    PROCEDURE:  XR CHEST PORT 1 VIEW    HISTORY:  wheezing, covid rule out.     COMPARISON:  None.    FINDINGS:   The cardiac silhouette is normal in size. The pulmonary vasculature is  normal.  The lungs are clear. No pleural effusion or pneumothorax.  There is an old right-sided rib fracture.      Impression    IMPRESSION:  No acute cardiopulmonary disease.      ANETTE FORD MD         SYSTEM ID:  [id number]       Medications - No data to display    Assessments & Plan (with Medical Decision Making)     I have reviewed the nursing notes.    I have reviewed the findings, diagnosis, plan and need for follow up with the patient.  (J06.9) Viral URI with cough  (primary encounter diagnosis)  Plan:   Benzonatate as needed for cough    Symptomatic treatments recommended.  -Discussed that antibiotics would not help symptoms of viral URI. Education provided on symptoms of secondary bacterial infection such as new fever, chills, rigors, shortness of breath, increased work of breathing, that can occur with viral URI and need for further evaluation, if they occur.   - Ensure you are staying  hydrated by drinking plenty of fluids or eating foods such as popsicles, jello, pudding.  - Honey can be soothing for sore throat  - Warm salt water gurgles can help soothe sore throat  - Rest  - Humidifier can help with congestion and help keep mucus membranes such as throat and nose from drying out.  - Sleeping slightly propped up can help with congestion and postnasal drainage that can worsen cough at bedtime.  - As long as you have never been told to take Tylenol and/or Ibuprofen you can use them to manage fever and body aches per package instructions  Make sure you eat when you take ibuprofen to avoid stomach upset.  - OTC cough medications per package instructions to help with cough. Check to see if the cough/cold medication already has acetaminophen (Tylenol) in it. If it does avoid taking additional Tylenol.  - If sudden onset of new fever, worsening symptoms return for further evaluation.  - OTC nasal steroid such as Flonase can help decrease sinus inflammation to help with congestion.  - Education provided on symptoms of post-viral bacterial infections including ear infection and pneumonia. This would require re-evaluation for treatment.    Follow up with primary care provider or return to urgent care/ED with any worsening in condition or additional concerns.     Discharge Medication List as of 8/19/2021  8:36 PM      START taking these medications    Details   benzonatate (TESSALON) 100 MG capsule Take 1 capsule (100 mg) by mouth 3 times daily as needed for cough, Disp-12 capsule, R-0, E-Prescribe           Final diagnoses:   Viral URI with cough     8/19/2021   [facility]     Mariaelena Evans NP  08/19/21 2041

## 2021-09-13 ENCOUNTER — OFFICE VISIT (OUTPATIENT)
Dept: OBGYN | Facility: OTHER | Age: 31
End: 2021-09-13
Attending: ADVANCED PRACTICE MIDWIFE
Payer: COMMERCIAL

## 2021-09-13 VITALS
WEIGHT: 164 LBS | BODY MASS INDEX: 30.96 KG/M2 | SYSTOLIC BLOOD PRESSURE: 102 MMHG | HEART RATE: 64 BPM | DIASTOLIC BLOOD PRESSURE: 66 MMHG | HEIGHT: 61 IN

## 2021-09-13 DIAGNOSIS — Z01.419 WELL WOMAN EXAM WITH ROUTINE GYNECOLOGICAL EXAM: ICD-10-CM

## 2021-09-13 DIAGNOSIS — Z12.4 ENCOUNTER FOR SCREENING FOR CERVICAL CANCER: ICD-10-CM

## 2021-09-13 DIAGNOSIS — Z71.6 ENCOUNTER FOR TOBACCO USE CESSATION COUNSELING: Primary | ICD-10-CM

## 2021-09-13 PROCEDURE — 99395 PREV VISIT EST AGE 18-39: CPT | Performed by: ADVANCED PRACTICE MIDWIFE

## 2021-09-13 PROCEDURE — 87624 HPV HI-RISK TYP POOLED RSLT: CPT | Performed by: ADVANCED PRACTICE MIDWIFE

## 2021-09-13 PROCEDURE — G0145 SCR C/V CYTO,THINLAYER,RESCR: HCPCS | Performed by: ADVANCED PRACTICE MIDWIFE

## 2021-09-13 RX ORDER — NICOTINE 21 MG/24HR
1 PATCH, TRANSDERMAL 24 HOURS TRANSDERMAL EVERY 24 HOURS
Qty: 28 PATCH | Refills: 1 | Status: SHIPPED | OUTPATIENT
Start: 2021-09-13 | End: 2022-09-30

## 2021-09-13 ASSESSMENT — MIFFLIN-ST. JEOR: SCORE: 1396.28

## 2021-09-13 ASSESSMENT — ANXIETY QUESTIONNAIRES
5. BEING SO RESTLESS THAT IT IS HARD TO SIT STILL: NOT AT ALL
GAD7 TOTAL SCORE: 0
3. WORRYING TOO MUCH ABOUT DIFFERENT THINGS: NOT AT ALL
4. TROUBLE RELAXING: NOT AT ALL
2. NOT BEING ABLE TO STOP OR CONTROL WORRYING: NOT AT ALL
7. FEELING AFRAID AS IF SOMETHING AWFUL MIGHT HAPPEN: NOT AT ALL
1. FEELING NERVOUS, ANXIOUS, OR ON EDGE: NOT AT ALL
6. BECOMING EASILY ANNOYED OR IRRITABLE: NOT AT ALL

## 2021-09-13 ASSESSMENT — PATIENT HEALTH QUESTIONNAIRE - PHQ9: SUM OF ALL RESPONSES TO PHQ QUESTIONS 1-9: 4

## 2021-09-13 ASSESSMENT — PAIN SCALES - GENERAL: PAINLEVEL: NO PAIN (0)

## 2021-09-13 NOTE — PATIENT INSTRUCTIONS
Chief Complaint   Patient presents with    Follow-up     PE   1. Have you been to the ER, urgent care clinic since your last visit? Hospitalized since your last visit? No    2. Have you seen or consulted any other health care providers outside of the [address] [place] [address] since your last visit? Include any pap smears or colon screening.  Yes Where: Dr William Triplett   Discuss surgery and when he can stop blood thinner Return to office in one year for well woman care and as needed.    Thank you for allowing Jamison MENDEZ CNM and our OB team to participate in your care.  If you have a scheduling or an appointment question please contact Karmen [facility] Coordinator at their direct line [phone].   ALL nursing questions or concerns can be directed to your OB nurse at: [phone] Destiny Muse/Paty

## 2021-09-13 NOTE — NURSING NOTE
"                                           Progress Note    Client Name: Tegan Slade  Chief Complaint   Patient presents with     Gyn Exam       Initial /66   Pulse 64   Ht 1.549 m (5' 1\")   Wt 74.4 kg (164 lb)   BMI 30.99 kg/m   Estimated body mass index is 30.99 kg/m  as calculated from the following:    Height as of this encounter: 1.549 m (5' 1\").    Weight as of this encounter: 74.4 kg (164 lb).  Medication Reconciliation: complete  Paty Matthews LPN    " "Curtis Gonzalez [facility]                                                         ________________________________________________________________________    TAE Winkler  7/31/2018    "

## 2021-09-13 NOTE — PROGRESS NOTES
LEONIDAS Bell is a 31 year old  female who presents for annual exam.   Youngest child 3  Largest Child 7 lb 1 oz  GDM n  Hypertension n  No LMP recorded.  Menses:  Cycles  Amenorrhea with Mirena IUD When did they start 13 How many days bleed na pain na Contraception Mirena IUD.  Planning pregnancy: n    Concerns in addition to routine health maintenance?  Constipation.  GYNECOLOGIC HISTORY:   Surgery: n  History sexually transmitted infections:No STD history   Safe?  y  STI testing offered?  y  History of abnormal Pap smear: n  Problems with sex? (bleeding/pain)n  Family history of: breast cancer: n   Uterine cancer: n ovarian cancer: n   colon cancer: n    HEALTH MAINTENANCE:  Cholesterol: (No results found for: CHOL History of abnormal lipids: n  Mammo: n . History of abnormal Mammo: na   Regular Self Breast Exams: y Calcium/Vitamin D or Vitamin: n Exercise y, lawn work, active lifestyle    TSH: (  TSH   Date Value Ref Range Status   2015 1.58 0.40 - 4.00 mU/L Final     Comment:     Effective 2014, the reference range for this assay has changed to reflect   new instrumentation/methodology.      )  Pap; (  Lab Results   Component Value Date    PAP NIL 2020    PAP NIL 2019    PAP NIL 2017    )    HISTORY:  OB History    Para Term  AB Living   3 3 3 0 0 3   SAB TAB Ectopic Multiple Live Births   0 0 0 0 3      # Outcome Date GA Lbr Marcel/2nd Weight Sex Delivery Anes PTL Lv   3 Term 18 38w4d 12:44 / 00:02 2.6 kg (5 lb 11.7 oz) M Vag-Spont IV, Nitrous N PRISCILLA      Name: Roger      Apgar1: 9  Apgar5: 9   2 Term 16 38w4d 02:30 / 00:16 2.821 kg (6 lb 3.5 oz) F Vag-Spont INT, TOPICAL N PRISCILLA      Name: Laly      Apgar1: 9  Apgar5: 10   1 Term 12 40w0d  3.204 kg (7 lb 1 oz) F Vag-Spont   PRISCILLA      Name: Leena     Past Medical History:   Diagnosis Date     Chronic interstitial cystitis 2011     Supervision of other normal pregnancy, antepartum  2015    STRETCH Dr. Kaminski        (spontaneous vaginal delivery) 2016    Dean old scar      Tobacco use disorder 2012     Past Surgical History:   Procedure Laterality Date     CYSTOSCOPY       Family History   Problem Relation Age of Onset     Other - See Comments Paternal Grandmother         brain aneurysm     Cancer Maternal Grandmother         lung     Diabetes Maternal Grandmother      Heart Disease Other 70        myocardial infarction     Social History     Socioeconomic History     Marital status:      Spouse name: None     Number of children: None     Years of education: None     Highest education level: None   Occupational History     Occupation: [employer]   Tobacco Use     Smoking status: Current Every Day Smoker     Packs/day: 0.50     Types: Cigarettes     Smokeless tobacco: Never Used     Tobacco comment: Has nicotine patches/ working on it   Substance and Sexual Activity     Alcohol use: Yes     Alcohol/week: 0.0 standard drinks     Comment: occasional     Drug use: No     Sexual activity: Yes     Partners: Male   Other Topics Concern      Service No     Blood Transfusions Yes     Comment: Permits if needed     Caffeine Concern No     Occupational Exposure No     Hobby Hazards No     Sleep Concern No     Stress Concern No     Weight Concern No     Special Diet No     Back Care No     Exercise No     Bike Helmet Not Asked     Seat Belt Yes     Self-Exams Yes     Parent/sibling w/ CABG, MI or angioplasty before 65F 55M? No   Social History Narrative     None     Social Determinants of Health     Financial Resource Strain:      Difficulty of Paying Living Expenses:    Food Insecurity:      Worried About Running Out of Food in the Last Year:      Ran Out of Food in the Last Year:    Transportation Needs:      Lack of Transportation (Medical):      Lack of Transportation (Non-Medical):    Physical Activity:      Days of Exercise per Week:      Minutes of Exercise per  Session:    Stress:      Feeling of Stress :    Social Connections:      Frequency of Communication with Friends and Family:      Frequency of Social Gatherings with Friends and Family:      Attends Yazidi Services:      Active Member of Clubs or Organizations:      Attends Club or Organization Meetings:      Marital Status:    Intimate Partner Violence:      Fear of Current or Ex-Partner:      Emotionally Abused:      Physically Abused:      Sexually Abused:        Current Outpatient Medications:      famotidine (PEPCID) 20 MG tablet, Take 1 tablet (20 mg) by mouth 2 times daily, Disp: 60 tablet, Rfl: 1     levonorgestrel (MIRENA) 20 MCG/24HR IUD, 1 each by Intrauterine route once, Disp: , Rfl:      nicotine (NICODERM CQ) 21 MG/24HR 24 hr patch, Place 1 patch onto the skin every 24 hours, Disp: 28 patch, Rfl: 1     omeprazole (PRILOSEC) 20 MG DR capsule, Take 1 capsule (20 mg) by mouth daily, Disp: 30 capsule, Rfl: 1     Allergies   Allergen Reactions     Seasonal Allergies      Latex Rash       Past medical, surgical, social and family history were reviewed and updated in [facility].    ROS:    CONSTITUTIONAL:     NEGATIVE for fever, chills, change in weight  INTEGUMENTARY/SKIN:       NEGATIVE for worrisome rashes, moles or lesions  EYES:     NEGATIVE for vision changes or irritation  ENT/MOUTH: NEGATIVE for ear, mouth and throat problems  RESP:     NEGATIVE for significant cough or SOB  CV:   NEGATIVE for chest pain, palpitations or peripheral edema  GI:     NEGATIVE for nausea, abdominal pain, heartburn, or change in bowel habits.  Hx of Chronic constipation  :   NEGATIVE for frequency, dysuria, hematuria, vaginal discharge, or irregular bleeding,incontinence   MUSCULOSKELETAL:     NEGATIVE for significant arthralgias or myalgia  NEURO:      NEGATIVE for weakness, dizziness or paresthesias  ENDOCRINE:      NEGATIVE for temperature intolerance, skin/hair changes  PSYCHIATRIC:      NEGATIVE for changes in mood or  "affect.     EXAM:   /66   Pulse 64   Ht 1.549 m (5' 1\")   Wt 74.4 kg (164 lb)   BMI 30.99 kg/m     BMI: Body mass index is 30.99 kg/m .  Constitutional: healthy, alert and no distress  Head: Normocephalic. No masses, lesions, tenderness or abnormalities  Neck: Neck supple. Trachea midline. No adenopathy. Thyroid symmetric, normal size.   Cardiovascular: RRR.   Respiratory: lungs clear   Breast: Breasts reveal mild symmetric fibrocystic densities, but there are no dominant, discrete, fixed or suspicious masses found.  Gastrointestinal: Abdomen soft, non-tender, non-distended. No masses, organomegaly.  :  Vulva:  No external lesions, normal female hair distribution, no inguinal adenopathy.    Urethra:  Midline, non-tender, well supported, no discharge  Vagina:  Moist, pink, no abnormal discharge, no lesions  Cervix: clean IUD strings visible  Uterus:  Normal size, non-tender, freely mobile  Ovaries:  No masses appreciated  Rectal Exam: deferred  Musculoskeletal: extremities normal  Skin: no suspicious lesions or rashes  Psychiatric: Affect appropriate, cooperative,mentation appears normal.     COUNSELING:   regular exercise  healthy diet/nutrition  Immunizations   contraception  family planning  Folic Acid Counseling   reports that she has been smoking cigarettes. She has been smoking about 0.50 packs per day. She has never used smokeless tobacco.  Tobacco Cessation Action Plan: Nicotine patches ordered  Body mass index is 30.99 kg/m .  Weight management plan: healthy eating and exercise  FRAX Risk Assessment    ASSESSMENT:  31 year old female with satisfactory annual exam  Need of cervical cancer screening  IUD strings visualized  Hx of chronic constipation/ medications managed by Dr. Dean  Tobacco use  Reports Covid vaccine completed    PLAN:   Pap with High Risk hpv  IUD check  Nicotine Patch transdermal change every 24 hours    Return to office: 1 year for well woman care and as needed    30 minutes " spent on the date of the encounter doing chart review, history and exam, documentation and further activities per the note      ANDREA Zaldivar, CNM

## 2021-09-14 ASSESSMENT — ANXIETY QUESTIONNAIRES: GAD7 TOTAL SCORE: 0

## 2021-09-16 LAB
BKR LAB AP GYN ADEQUACY: NORMAL
BKR LAB AP GYN INTERPRETATION: NORMAL
BKR LAB AP HPV REFLEX: NORMAL
BKR LAB AP PREVIOUS ABNORMAL: NORMAL
PATH REPORT.COMMENTS IMP SPEC: NORMAL
PATH REPORT.RELEVANT HX SPEC: NORMAL

## 2021-09-20 LAB
HUMAN PAPILLOMA VIRUS 16 DNA: NEGATIVE
HUMAN PAPILLOMA VIRUS 18 DNA: NEGATIVE
HUMAN PAPILLOMA VIRUS FINAL DIAGNOSIS: NORMAL
HUMAN PAPILLOMA VIRUS OTHER HR: NEGATIVE

## 2021-10-02 ENCOUNTER — HEALTH MAINTENANCE LETTER (OUTPATIENT)
Age: 31
End: 2021-10-02

## 2021-11-21 ENCOUNTER — MYC MEDICAL ADVICE (OUTPATIENT)
Dept: FAMILY MEDICINE | Facility: OTHER | Age: 31
End: 2021-11-21
Payer: COMMERCIAL

## 2021-11-30 ENCOUNTER — IMMUNIZATION (OUTPATIENT)
Dept: FAMILY MEDICINE | Facility: OTHER | Age: 31
End: 2021-11-30
Attending: FAMILY MEDICINE
Payer: COMMERCIAL

## 2021-11-30 PROCEDURE — 0004A PR COVID VAC PFIZER DIL RECON 30 MCG/0.3 ML IM: CPT

## 2021-11-30 PROCEDURE — 91300 PR COVID VAC PFIZER DIL RECON 30 MCG/0.3 ML IM: CPT

## 2021-12-03 ENCOUNTER — MYC MEDICAL ADVICE (OUTPATIENT)
Dept: FAMILY MEDICINE | Facility: OTHER | Age: 31
End: 2021-12-03
Payer: COMMERCIAL

## 2021-12-03 DIAGNOSIS — T75.3XXA MOTION SICKNESS, INITIAL ENCOUNTER: Primary | ICD-10-CM

## 2021-12-06 RX ORDER — SCOLOPAMINE TRANSDERMAL SYSTEM 1 MG/1
1 PATCH, EXTENDED RELEASE TRANSDERMAL
Qty: 3 PATCH | Refills: 0 | Status: SHIPPED | OUTPATIENT
Start: 2021-12-06 | End: 2022-11-14

## 2021-12-06 RX ORDER — MECLIZINE HYDROCHLORIDE 25 MG/1
25-50 TABLET ORAL DAILY PRN
Qty: 10 TABLET | Refills: 0 | Status: SHIPPED | OUTPATIENT
Start: 2021-12-06 | End: 2022-11-14

## 2021-12-06 NOTE — TELEPHONE ENCOUNTER
Called patient and answered her rapid covid testing questions for travel and advised meds were sent to pharmacy.

## 2021-12-06 NOTE — TELEPHONE ENCOUNTER
Please route to triage for questions on pretravel testing scheduling.  I will send a scopolamine patch for patient and meclizine.  patch is left on for 3 days if needing more long term.  Meclizine pill is daily if needed.  Otherwise she can use OTC Dramamine.

## 2022-01-12 ENCOUNTER — TELEPHONE (OUTPATIENT)
Dept: FAMILY MEDICINE | Facility: OTHER | Age: 32
End: 2022-01-12
Payer: COMMERCIAL

## 2022-01-31 DIAGNOSIS — K26.9 DUODENAL ULCER DUE TO HELICOBACTER PYLORI: ICD-10-CM

## 2022-01-31 DIAGNOSIS — B96.81 DUODENAL ULCER DUE TO HELICOBACTER PYLORI: ICD-10-CM

## 2022-02-14 ENCOUNTER — MYC MEDICAL ADVICE (OUTPATIENT)
Dept: FAMILY MEDICINE | Facility: OTHER | Age: 32
End: 2022-02-14
Payer: COMMERCIAL

## 2022-03-07 ENCOUNTER — OFFICE VISIT (OUTPATIENT)
Dept: CHIROPRACTIC MEDICINE | Facility: OTHER | Age: 32
End: 2022-03-07
Attending: CHIROPRACTOR
Payer: COMMERCIAL

## 2022-03-07 DIAGNOSIS — M99.01 SEGMENTAL AND SOMATIC DYSFUNCTION OF CERVICAL REGION: Primary | ICD-10-CM

## 2022-03-07 DIAGNOSIS — M99.03 SEGMENTAL AND SOMATIC DYSFUNCTION OF LUMBAR REGION: ICD-10-CM

## 2022-03-07 DIAGNOSIS — M99.02 SEGMENTAL AND SOMATIC DYSFUNCTION OF THORACIC REGION: ICD-10-CM

## 2022-03-07 DIAGNOSIS — M54.2 CERVICALGIA: ICD-10-CM

## 2022-03-07 PROCEDURE — 99212 OFFICE O/P EST SF 10 MIN: CPT | Mod: 25 | Performed by: CHIROPRACTOR

## 2022-03-07 PROCEDURE — 98941 CHIROPRACT MANJ 3-4 REGIONS: CPT | Mod: AT | Performed by: CHIROPRACTOR

## 2022-03-09 NOTE — PROGRESS NOTES
Subjective Finding:    Chief compalint: Patient presents with:  Neck Pain  Back Pain  , Pain Scale: 6/10, Intensity: sharp, Duration: 2 weeks, Radiating:  no.    Date of injury:     Activities that the pain restricts:   Home/household/hobbies/social activities: yes.  Work duties: yes.  Sleep: yes.  Makes symptoms better: rest.  Makes symptoms worse: lumbar flexion.  Have you seen anyone else for the symptoms? No.  Work related: no.  Automobile related injury: no.    Objective and Assessment:    Posture Analysis:   High shoulder: .  Head tilt: .  High iliac crest: .  Head carriage: neutral.  Thoracic Kyphosis: neutral.  Lumbar Lordosis: forward.    Lumbar Range of Motion: extension decreased.  Cervical Range of Motion: .  Thoracic Range of Motion: .  Extremity Range of Motion: .    Palpation:   Quad lumb: bilateral, referred pain: no    Segmental dysfunction pre-treat   Assessment post-treatment:C56  T2  L5  Cervical: .  Thoracic: ROM increased.  Lumbar: ROM increased.    Comments: .      Complicating Factors: .    Procedure(s):  CMT:  69489 Chiropractic manipulative treatment 1-2 regions performed   Thoracic: Diversified, See above for level, Prone and Lumbar: Diversified, See above for level, Side posture    Modalities:  None performed this visit    Therapeutic procedures:  None    Plan:  Treatment plan: PRN.  Instructed patient: stretch as instructed at visit.  Short term goals: reduce pain.  Long term goals: .  Prognosis: excellent.

## 2022-03-29 ENCOUNTER — MYC MEDICAL ADVICE (OUTPATIENT)
Dept: FAMILY MEDICINE | Facility: OTHER | Age: 32
End: 2022-03-29
Payer: COMMERCIAL

## 2022-03-29 DIAGNOSIS — H53.9 VISION CHANGES: Primary | ICD-10-CM

## 2022-03-29 NOTE — TELEPHONE ENCOUNTER
Patient returning call. Notified referral has been sent to Dr. Kaminski.     Patient verbalized understanding.

## 2022-04-06 ENCOUNTER — MYC MEDICAL ADVICE (OUTPATIENT)
Dept: FAMILY MEDICINE | Facility: OTHER | Age: 32
End: 2022-04-06
Payer: COMMERCIAL

## 2022-04-06 NOTE — TELEPHONE ENCOUNTER
Subject: Eye exam referral /insurance referral      The referral sent for our annual eye exam last week was incorrect per [facility]. They said the we needed an Insurance Referral. Also it was not dated correctly. Our exams were March 29th for myself and Leena.

## 2022-05-12 ENCOUNTER — OFFICE VISIT (OUTPATIENT)
Dept: CHIROPRACTIC MEDICINE | Facility: OTHER | Age: 32
End: 2022-05-12
Attending: CHIROPRACTOR
Payer: COMMERCIAL

## 2022-05-12 DIAGNOSIS — M99.02 SEGMENTAL AND SOMATIC DYSFUNCTION OF THORACIC REGION: ICD-10-CM

## 2022-05-12 DIAGNOSIS — M54.50 ACUTE BILATERAL LOW BACK PAIN WITHOUT SCIATICA: ICD-10-CM

## 2022-05-12 DIAGNOSIS — M99.01 SEGMENTAL AND SOMATIC DYSFUNCTION OF CERVICAL REGION: ICD-10-CM

## 2022-05-12 DIAGNOSIS — M99.03 SEGMENTAL AND SOMATIC DYSFUNCTION OF LUMBAR REGION: Primary | ICD-10-CM

## 2022-05-12 PROCEDURE — 98941 CHIROPRACT MANJ 3-4 REGIONS: CPT | Mod: AT | Performed by: CHIROPRACTOR

## 2022-05-23 NOTE — PROGRESS NOTES
Subjective Finding:    Chief compalint: Patient presents with:  Back Pain  , Pain Scale: 6/10, Intensity: sharp, Duration: 2 weeks, Radiating:  no.    Date of injury:     Activities that the pain restricts:   Home/household/hobbies/social activities: yes.  Work duties: yes.  Sleep: yes.  Makes symptoms better: rest.  Makes symptoms worse: lumbar flexion.  Have you seen anyone else for the symptoms? No.  Work related: no.  Automobile related injury: no.    Objective and Assessment:    Posture Analysis:   High shoulder: .  Head tilt: .  High iliac crest: .  Head carriage: neutral.  Thoracic Kyphosis: neutral.  Lumbar Lordosis: forward.    Lumbar Range of Motion: extension decreased.  Cervical Range of Motion: .  Thoracic Range of Motion: .  Extremity Range of Motion: .    Palpation:   Quad lumb: bilateral, referred pain: no    Segmental dysfunction pre-treat   Assessment post-treatment:C56  T2  L5  Cervical: .  Thoracic: ROM increased.  Lumbar: ROM increased.    Comments: .      Complicating Factors: .    Procedure(s):  CMT:  45404 Chiropractic manipulative treatment 1-2 regions performed   Thoracic: Diversified, See above for level, Prone and Lumbar: Diversified, See above for level, Side posture    Modalities:  None performed this visit    Therapeutic procedures:  None    Plan:  Treatment plan: PRN.  Instructed patient: stretch as instructed at visit.  Short term goals: reduce pain.  Long term goals: .  Prognosis: excellent.

## 2022-09-04 ENCOUNTER — HEALTH MAINTENANCE LETTER (OUTPATIENT)
Age: 32
End: 2022-09-04

## 2022-09-29 ENCOUNTER — HOSPITAL ENCOUNTER (EMERGENCY)
Facility: HOSPITAL | Age: 32
Discharge: HOME | End: 2022-09-29
Attending: PHYSICIAN ASSISTANT | Admitting: PHYSICIAN ASSISTANT
Payer: COMMERCIAL

## 2022-09-29 ENCOUNTER — APPOINTMENT (OUTPATIENT)
Dept: CT IMAGING | Facility: HOSPITAL | Age: 32
End: 2022-09-29
Attending: PHYSICIAN ASSISTANT
Payer: COMMERCIAL

## 2022-09-29 ENCOUNTER — NURSE TRIAGE (OUTPATIENT)
Dept: FAMILY MEDICINE | Facility: OTHER | Age: 32
End: 2022-09-29

## 2022-09-29 VITALS
DIASTOLIC BLOOD PRESSURE: 79 MMHG | WEIGHT: 158 LBS | TEMPERATURE: 98.6 F | RESPIRATION RATE: 18 BRPM | SYSTOLIC BLOOD PRESSURE: 119 MMHG | OXYGEN SATURATION: 99 % | HEART RATE: 90 BPM | BODY MASS INDEX: 29.85 KG/M2

## 2022-09-29 DIAGNOSIS — R42 DIZZINESS: ICD-10-CM

## 2022-09-29 LAB
ALBUMIN SERPL-MCNC: 4.4 G/DL (ref 3.4–5)
ALBUMIN UR-MCNC: NEGATIVE MG/DL
ALP SERPL-CCNC: 85 U/L (ref 40–150)
ALT SERPL W P-5'-P-CCNC: 19 U/L (ref 0–50)
ANION GAP SERPL CALCULATED.3IONS-SCNC: 5 MMOL/L (ref 3–14)
APPEARANCE UR: CLEAR
AST SERPL W P-5'-P-CCNC: 7 U/L (ref 0–45)
BASOPHILS # BLD AUTO: 0.1 10E3/UL (ref 0–0.2)
BASOPHILS NFR BLD AUTO: 1 %
BILIRUB SERPL-MCNC: 0.5 MG/DL (ref 0.2–1.3)
BILIRUB UR QL STRIP: NEGATIVE
BUN SERPL-MCNC: 7 MG/DL (ref 7–30)
CALCIUM SERPL-MCNC: 9.3 MG/DL (ref 8.5–10.1)
CHLORIDE BLD-SCNC: 107 MMOL/L (ref 94–109)
CO2 SERPL-SCNC: 26 MMOL/L (ref 20–32)
COLOR UR AUTO: ABNORMAL
CREAT SERPL-MCNC: 0.61 MG/DL (ref 0.52–1.04)
EOSINOPHIL # BLD AUTO: 0.1 10E3/UL (ref 0–0.7)
EOSINOPHIL NFR BLD AUTO: 1 %
ERYTHROCYTE [DISTWIDTH] IN BLOOD BY AUTOMATED COUNT: 12.4 % (ref 10–15)
GFR SERPL CREATININE-BSD FRML MDRD: >90 ML/MIN/1.73M2
GLUCOSE BLD-MCNC: 85 MG/DL (ref 70–99)
GLUCOSE UR STRIP-MCNC: NEGATIVE MG/DL
HCG UR QL: NEGATIVE
HCT VFR BLD AUTO: 45.7 % (ref 35–47)
HGB BLD-MCNC: 16.1 G/DL (ref 11.7–15.7)
HGB UR QL STRIP: NEGATIVE
HOLD SPECIMEN: NORMAL
HOLD SPECIMEN: NORMAL
IMM GRANULOCYTES # BLD: 0 10E3/UL
IMM GRANULOCYTES NFR BLD: 0 %
KETONES UR STRIP-MCNC: NEGATIVE MG/DL
LEUKOCYTE ESTERASE UR QL STRIP: NEGATIVE
LYMPHOCYTES # BLD AUTO: 2 10E3/UL (ref 0.8–5.3)
LYMPHOCYTES NFR BLD AUTO: 22 %
MAGNESIUM SERPL-MCNC: 2.1 MG/DL (ref 1.6–2.3)
MCH RBC QN AUTO: 31.4 PG (ref 26.5–33)
MCHC RBC AUTO-ENTMCNC: 35.2 G/DL (ref 31.5–36.5)
MCV RBC AUTO: 89 FL (ref 78–100)
MONOCYTES # BLD AUTO: 0.6 10E3/UL (ref 0–1.3)
MONOCYTES NFR BLD AUTO: 7 %
MUCOUS THREADS #/AREA URNS LPF: PRESENT /LPF
NEUTROPHILS # BLD AUTO: 6.4 10E3/UL (ref 1.6–8.3)
NEUTROPHILS NFR BLD AUTO: 69 %
NITRATE UR QL: NEGATIVE
NRBC # BLD AUTO: 0 10E3/UL
NRBC BLD AUTO-RTO: 0 /100
PH UR STRIP: 7.5 [PH] (ref 4.7–8)
PLATELET # BLD AUTO: 237 10E3/UL (ref 150–450)
POTASSIUM BLD-SCNC: 3.7 MMOL/L (ref 3.4–5.3)
PROT SERPL-MCNC: 7.7 G/DL (ref 6.8–8.8)
RBC # BLD AUTO: 5.13 10E6/UL (ref 3.8–5.2)
RBC URINE: 1 /HPF
SODIUM SERPL-SCNC: 138 MMOL/L (ref 133–144)
SP GR UR STRIP: 1 (ref 1–1.03)
SQUAMOUS EPITHELIAL: 0 /HPF
UROBILINOGEN UR STRIP-MCNC: NORMAL MG/DL
WBC # BLD AUTO: 9.2 10E3/UL (ref 4–11)
WBC URINE: <1 /HPF

## 2022-09-29 PROCEDURE — 93005 ELECTROCARDIOGRAM TRACING: CPT

## 2022-09-29 PROCEDURE — 81001 URINALYSIS AUTO W/SCOPE: CPT | Performed by: PHYSICIAN ASSISTANT

## 2022-09-29 PROCEDURE — 99285 EMERGENCY DEPT VISIT HI MDM: CPT | Mod: 25

## 2022-09-29 PROCEDURE — 83735 ASSAY OF MAGNESIUM: CPT | Performed by: PHYSICIAN ASSISTANT

## 2022-09-29 PROCEDURE — 99284 EMERGENCY DEPT VISIT MOD MDM: CPT | Performed by: PHYSICIAN ASSISTANT

## 2022-09-29 PROCEDURE — 36415 COLL VENOUS BLD VENIPUNCTURE: CPT | Performed by: PHYSICIAN ASSISTANT

## 2022-09-29 PROCEDURE — 70450 CT HEAD/BRAIN W/O DYE: CPT

## 2022-09-29 PROCEDURE — 250N000013 HC RX MED GY IP 250 OP 250 PS 637: Performed by: PHYSICIAN ASSISTANT

## 2022-09-29 PROCEDURE — 80053 COMPREHEN METABOLIC PANEL: CPT | Performed by: PHYSICIAN ASSISTANT

## 2022-09-29 PROCEDURE — 93010 ELECTROCARDIOGRAM REPORT: CPT | Performed by: INTERNAL MEDICINE

## 2022-09-29 PROCEDURE — 85048 AUTOMATED LEUKOCYTE COUNT: CPT | Performed by: PHYSICIAN ASSISTANT

## 2022-09-29 PROCEDURE — 81025 URINE PREGNANCY TEST: CPT | Performed by: PHYSICIAN ASSISTANT

## 2022-09-29 RX ORDER — ACETAMINOPHEN 325 MG/1
975 TABLET ORAL ONCE
Status: COMPLETED | OUTPATIENT
Start: 2022-09-29 | End: 2022-09-29

## 2022-09-29 RX ORDER — MECLIZINE HYDROCHLORIDE 25 MG/1
25 TABLET ORAL ONCE
Status: COMPLETED | OUTPATIENT
Start: 2022-09-29 | End: 2022-09-29

## 2022-09-29 RX ADMIN — ACETAMINOPHEN 975 MG: 325 TABLET, FILM COATED ORAL at 11:45

## 2022-09-29 RX ADMIN — MECLIZINE HYDROCHLORIDE 25 MG: 25 TABLET ORAL at 11:35

## 2022-09-29 ASSESSMENT — ENCOUNTER SYMPTOMS
VOMITING: 0
CHEST TIGHTNESS: 0
NAUSEA: 0
NECK PAIN: 0
APPETITE CHANGE: 0
LIGHT-HEADEDNESS: 1
HEADACHES: 1
DIAPHORESIS: 0
SHORTNESS OF BREATH: 0
PHOTOPHOBIA: 0
FATIGUE: 0
FEVER: 0
ABDOMINAL PAIN: 0
NECK STIFFNESS: 0
SINUS PRESSURE: 0
WEAKNESS: 0
COUGH: 0
ACTIVITY CHANGE: 0
BRUISES/BLEEDS EASILY: 0
DIZZINESS: 1

## 2022-09-29 NOTE — TELEPHONE ENCOUNTER
"Dizzy/lightheaded x 4 days. Denies fever or cold symptoms.     Next 5 appointments (look out 90 days)    Sep 30, 2022  8:40 AM  (Arrive by 8:25 AM)  SHORT with Demario Dumont DO  [facility] [place] ([facility] [place] ) [address]  [place] [address]  [phone]            Reason for Disposition    [1] MILD dizziness (e.g., walking normally) AND [2] has NOT been evaluated by physician for this  (Exception: dizziness caused by heat exposure, sudden standing, or poor fluid intake)    Additional Information    Negative: SEVERE difficulty breathing (e.g., struggling for each breath, speaks in single words)    Negative: [1] Difficulty breathing or swallowing AND [2] started suddenly after medicine, an allergic food or bee sting    Negative: Shock suspected (e.g., cold/pale/clammy skin, too weak to stand, low BP, rapid pulse)    Negative: Difficult to awaken or acting confused (e.g., disoriented, slurred speech)    Negative: [1] Weakness (i.e., paralysis, loss of muscle strength) of the face, arm or leg on one side of the body AND [2] sudden onset AND [3] present now    Negative: [1] Numbness (i.e., loss of sensation) of the face, arm or leg on one side of the body AND [2] sudden onset AND [3] present now    Negative: [1] Loss of speech or garbled speech AND [2] sudden onset AND [3] present now    Negative: Overdose (accidental or intentional) of medications    Negative: [1] Fainted > 15 minutes ago AND [2] still feels too weak or dizzy to stand    Negative: Heart beating < 50 beats per minute OR > 140 beats per minute    Negative: Sounds like a life-threatening emergency to the triager    Negative: Chest pain    Negative: Rectal bleeding, bloody stool, or tarry-black stool    Negative: [1] Vomiting AND [2] contains red blood or black (\"coffee ground\") material    Negative: Vomiting is main symptom    Negative: Diarrhea is main symptom    Negative: Headache is main symptom    Negative: " "Patient states that they are having an anxiety or panic attack    Negative: Dizziness from low blood sugar (i.e., < 60 mg/dl or 3.5 mmol/l)    Negative: Dizziness is described as a spinning sensation (i.e., vertigo)    Negative: Heat exhaustion suspected (i.e., dehydration from heat exposure)    Negative: Difficulty breathing    Negative: SEVERE dizziness (e.g., unable to stand, requires support to walk, feels like passing out now)    Negative: Extra heart beats OR irregular heart beating (i.e., \"palpitations\")    Negative: [1] Drinking very little AND [2] dehydration suspected (e.g., no urine > 12 hours, very dry mouth, very lightheaded)    Negative: [1] Weakness (i.e., paralysis, loss of muscle strength) of the face, arm / hand, or leg / foot on one side of the body AND [2] sudden onset AND [3] brief (now gone)    Negative: [1] Numbness (i.e., loss of sensation) of the face, arm / hand, or leg / foot on one side of the body AND [2] sudden onset AND [3] brief (now gone)    Negative: [1] Loss of speech or garbled speech AND [2] sudden onset AND [3] brief (now gone)    Negative: Loss of vision or double vision (Exception: similar to previous migraines)    Negative: Patient sounds very sick or weak to the triager    Negative: [1] Dizziness caused by heat exposure, sudden standing, or poor fluid intake AND [2] no improvement after 2 hours of rest and fluids    Negative: [1] Fever > 103 F (39.4 C) AND [2] not able to get the fever down using Fever Care Advice    Negative: [1] Fever > 101 F (38.3 C) AND [2] age > 60 years    Negative: [1] Fever > 100.0 F (37.8 C) AND [2] bedridden (e.g., nursing home patient, CVA, chronic illness, recovering from surgery)    Negative: [1] Fever > 100.0 F (37.8 C) AND [2] diabetes mellitus or weak immune system (e.g., HIV positive, cancer chemo, splenectomy, organ transplant, chronic steroids)    Negative: [1] MODERATE dizziness (e.g., interferes with normal activities) AND [2] has NOT " "been evaluated by physician for this  (Exception: dizziness caused by heat exposure, sudden standing, or poor fluid intake)    Negative: Fever present > 3 days (72 hours)    Negative: Taking a medicine that could cause dizziness (e.g., blood pressure medications, diuretics)    Negative: [1] MODERATE dizziness (e.g., interferes with normal activities) AND [2] has been evaluated by physician for this    Answer Assessment - Initial Assessment Questions  1. DESCRIPTION: \"Describe your dizziness.\"      Dizziness constant - mild - moderate at times    2. LIGHTHEADED: \"Do you feel lightheaded?\" (e.g., somewhat faint, woozy, weak upon standing)      No     3. VERTIGO: \"Do you feel like either you or the room is spinning or tilting?\" (i.e. vertigo)      Yes     4. SEVERITY: \"How bad is it?\"  \"Do you feel like you are going to faint?\" \"Can you stand and walk?\"    - MILD: Feels slightly dizzy, but walking normally.    - MODERATE: Feels unsteady when walking, but not falling; interferes with normal activities (e.g., school, work).    - SEVERE: Unable to walk without falling, or requires assistance to walk without falling; feels like passing out now.       Moderate     5. ONSET:  \"When did the dizziness begin?\"      X 4 days ago     6. AGGRAVATING FACTORS: \"Does anything make it worse?\" (e.g., standing, change in head position)      No     7. HEART RATE: \"Can you tell me your heart rate?\" \"How many beats in 15 seconds?\"  (Note: not all patients can do this)          8. CAUSE: \"What do you think is causing the dizziness?\"      Unknown     9. RECURRENT SYMPTOM: \"Have you had dizziness before?\" If Yes, ask: \"When was the last time?\" \"What happened that time?\"      X 1 many years ago      10. OTHER SYMPTOMS: \"Do you have any other symptoms?\" (e.g., fever, chest pain, vomiting, diarrhea, bleeding)        No     11. PREGNANCY: \"Is there any chance you are pregnant?\" \"When was your last menstrual period?\"        No    Protocols used: " DIZZINESS - [id number]

## 2022-09-29 NOTE — ED TRIAGE NOTES
"Patient presents with complaints of dizziness that has been going on since Monday. Has an appointment tomorrow with primary but states it seems worse today. She states that it seems like she's on a boat. \"The feeling you get from being care sick\"      "

## 2022-09-29 NOTE — PROGRESS NOTES
"  Assessment & Plan     Muscle spasm  Lightheadedness  Cigarette nicotine dependence without complication  - cyclobenzaprine (FLEXERIL) 10 MG tablet; Take 1 tablet (10 mg) by mouth 3 times daily as needed for muscle spasms  - diclofenac (VOLTAREN) 75 MG EC tablet; Take 1 tablet (75 mg) by mouth 2 times daily  - Physical Therapy Referral; Future      Symptoms provoked in clinic with palpation if her tight neck/upper back muscles.  Will trial NSAID, MSK Relaxer, PT.  GI and fatigue precautions discussed, also discussed avoiding other OTC NSAIDs.  If worsening or not improving, she will follow-up.  Also recommended smoking cessation.      MAL GTZ,   [facility] - [place] Francois   Arabella is a 32 year old presenting for the following health issues:  Dizziness      VANESSA Bell is a 32 year old female whom I am seeing for the first time today.  She is here as an ER follow-up for lightheadedness.  She actually describes the feeling as \"car sick\" or \"just getting off a boat\" instead of true vertigo or presyncope.  Not worsened by big position changes such as sitting to standing, not worsened or provoked by head movements.  She does seem to notice some symptoms with speeding up/slowing down in a car or with reversing in a car, however she has these symptoms at baseline every 1-2 weeks.  Not worsened.  She has some palpable knots in her upper back, and her  has been trying to massage these out.  She reports that aggressive palpation of these areas can reproduce her symptoms and causing shooting pain into her head.  More recently she has been having a vague central forehead area headache, no visual changes.  This is not exertional.  She reports her eye exam was done just a few months ago and normal.  She denies stuffy nose or sinus pressure.  No sore throat or ear pain/pressure.  No cough, no fever, no ill symptoms in general.  She was in the ER yesterday and reports being given a dose of " "meclizine, this reportedly did not change her symptoms.  Eval included CT Head, CBC, CMP, Mg, Hcg, UA.    Dizziness/Lightheadedness  Onset/Duration: 9/26/22  Description:   Do you feel faint: pt reports moderate dizziness  Does it feel like the surroundings (bed, room) are moving: No  Unsteady/off balance: No  Have you passed out or fallen: No  Intensity: moderate  Progression of Symptoms: same  Accompanying Signs & Symptoms:  Heart palpitations or chest pain: No  Nausea, vomiting: No  Weakness or lack of coordination in arms or legs: No  Vision or speech changes: No  Numbness or tingling: No  Ringing in ears (Tinnitus): No  Hearing Loss: No  History:   Head trauma/concussion history: No  Previous similar symptoms: 13-14 years ago pt reports being dx with vertigo. One episode only  Recent bleeding history: No  Any new medications (BP?): No  Precipitating factors:   Worse with activity: No  Worse with head movement: No  Alleviating factors:   Does staying in a fixed position give relief: no   Therapies tried and outcome: meclizine x1 no help        Review of Systems   Constitutional: Negative for chills, fatigue and fever.   HENT: Negative for ear discharge, ear pain, facial swelling, hearing loss, rhinorrhea, sinus pressure, sinus pain, sore throat and tinnitus.    Eyes: Negative for photophobia, discharge and visual disturbance.   Respiratory: Negative for cough and shortness of breath.    Cardiovascular: Negative for chest pain, palpitations and peripheral edema.   Gastrointestinal: Negative for abdominal pain.   Neurological: Negative for tremors, syncope, weakness, numbness and paresthesias.            Objective    /62 (BP Location: Left arm, Patient Position: Sitting, Cuff Size: Adult Regular)   Pulse 90   Temp 98.4  F (36.9  C)   Resp 12   Ht 1.549 m (5' 1\")   Wt 71.7 kg (158 lb)   SpO2 98%   BMI 29.85 kg/m    Body mass index is 29.85 kg/m .  Physical Exam  Constitutional:       General: She is " not in acute distress.     Appearance: Normal appearance.   HENT:      Head: Normocephalic and atraumatic.      Right Ear: Tympanic membrane and external ear normal.      Left Ear: Tympanic membrane and external ear normal.      Nose: No congestion or rhinorrhea.      Mouth/Throat:      Mouth: Mucous membranes are moist.      Pharynx: Oropharynx is clear. No oropharyngeal exudate or posterior oropharyngeal erythema.   Eyes:      General: No scleral icterus.     Extraocular Movements: Extraocular movements intact.      Conjunctiva/sclera: Conjunctivae normal.      Pupils: Pupils are equal, round, and reactive to light.   Neck:      Vascular: No carotid bruit.   Cardiovascular:      Rate and Rhythm: Normal rate and regular rhythm.      Pulses: Normal pulses.      Heart sounds: Normal heart sounds. No murmur heard.  Pulmonary:      Effort: Pulmonary effort is normal.      Breath sounds: Normal breath sounds. No wheezing, rhonchi or rales.   Abdominal:      General: Bowel sounds are normal.      Palpations: Abdomen is soft.   Musculoskeletal:      Cervical back: Neck supple.      Comments: Tender bilat upper trap and lev scap muscles, palpation provokes her lightheadedness and shooting pain into head.   Lymphadenopathy:      Cervical: No cervical adenopathy.   Neurological:      General: No focal deficit present.      Mental Status: She is alert and oriented to person, place, and time.      Cranial Nerves: No cranial nerve deficit.   Psychiatric:         Mood and Affect: Mood normal.         Behavior: Behavior normal.         Thought Content: Thought content normal.         Judgment: Judgment normal.

## 2022-09-29 NOTE — ED PROVIDER NOTES
"  History     Chief Complaint   Patient presents with     Dizziness     The history is provided by the patient.     Arabella Desai is a 32 year old female who presented to the emergency department ambulatory with steady gait for evaluation of approximate 4 days of dizziness.  The dizziness she describes as \"like I am on a boat.\"  Denies spinning sensation.  Denies difficulty speaking or walking.  Denies recent fevers.  Denies chest discomfort.  She has been experiencing some mild persistent headaches.  Denies any cough.  Denies any abdominal pain.  Denies any nausea or vomiting.    Allergies:  Allergies   Allergen Reactions     Seasonal Allergies      Latex Rash       Problem List:    Patient Active Problem List    Diagnosis Date Noted     Duodenal ulcer due to Helicobacter pylori 2020     Priority: Medium     Slow transit constipation 2019     Priority: Medium     Other acute gastritis without hemorrhage 2019     Priority: Medium     Chronic cough 2017     Priority: Medium     Glucose intolerance of pregnancy 2017     Priority: Medium     EFW 38 wks       Chronic seasonal allergic rhinitis due to pollen 2017     Priority: Medium     Encounter for birth control 2016     Priority: Medium     History of depression 2015     Priority: Medium     Grief and postpartum. Patient believes pp depression may have been associated with breast feeding aggression from nurses       Need for influenza vaccination 2015     Priority: Medium     Defers 17         Proteinuria 2015     Priority: Medium     Tobacco use disorder 2012     Priority: Medium     Pneumovax done 7/10/17          Past Medical History:    Past Medical History:   Diagnosis Date     Chronic interstitial cystitis 2011     Supervision of other normal pregnancy, antepartum 2015      (spontaneous vaginal delivery) 2016     Tobacco use disorder 2012       Past Surgical " History:    Past Surgical History:   Procedure Laterality Date     CYSTOSCOPY         Family History:    Family History   Problem Relation Age of Onset     Other - See Comments Paternal Grandmother         brain aneurysm     Cancer Maternal Grandmother         lung     Diabetes Maternal Grandmother      Heart Disease Other 70        myocardial infarction       Social History:  Marital Status:   [2]  Social History     Tobacco Use     Smoking status: Current Every Day Smoker     Packs/day: 0.50     Types: Cigarettes     Smokeless tobacco: Never Used     Tobacco comment: Has nicotine patches/ working on it   Substance Use Topics     Alcohol use: Yes     Comment: once a week     Drug use: No        Medications:    famotidine (PEPCID) 20 MG tablet  meclizine (ANTIVERT) 25 MG tablet  nicotine (NICODERM CQ) 21 MG/24HR 24 hr patch  omeprazole (PRILOSEC) 20 MG DR capsule  scopolamine (TRANSDERM) 1 MG/3DAYS 72 hr patch  levonorgestrel (MIRENA) 20 MCG/24HR IUD          Review of Systems   Constitutional: Negative for activity change, appetite change, diaphoresis, fatigue and fever.   HENT: Negative for ear discharge, ear pain and sinus pressure.    Eyes: Negative for photophobia and visual disturbance.   Respiratory: Negative for cough, chest tightness and shortness of breath.    Cardiovascular: Negative for chest pain.   Gastrointestinal: Negative for abdominal pain, nausea and vomiting.   Musculoskeletal: Negative for neck pain and neck stiffness.   Skin: Negative.    Allergic/Immunologic: Negative for immunocompromised state.   Neurological: Positive for dizziness, light-headedness and headaches. Negative for weakness.   Hematological: Does not bruise/bleed easily.       Physical Exam   BP: 126/83  Pulse: 104  Temp: 98.7  F (37.1  C)  Resp: 16  Weight: 71.7 kg (158 lb)  SpO2: 99 %      Physical Exam  Vitals and nursing note reviewed.   Constitutional:       General: She is not in acute distress.     Appearance:  Normal appearance. She is normal weight. She is not ill-appearing, toxic-appearing or diaphoretic.   Cardiovascular:      Rate and Rhythm: Normal rate and regular rhythm.   Pulmonary:      Effort: Pulmonary effort is normal.   Skin:     General: Skin is warm and dry.      Capillary Refill: Capillary refill takes less than 2 seconds.   Neurological:      General: No focal deficit present.      Mental Status: She is alert and oriented to person, place, and time.      Comments: Cranial nerve examination: revealed that for cranial nerve   II: the pupils were reactive and the visual field were full  III, IV, and VI, the extraocular movements were full.    V: facial sensation intact bilateral   VII: facial movements are symmetric  VIII: hearing intact to voice  IX & X: the soft palate rises symmetrically   XI: shoulder movements are symmetric  XII: tongue is midline    Neurological examination:  That the patient was awake and alert, the attention, orientation, concentration, language, memory and fund of knowledge were all normal.  The patient had no neglect or apraxia.    Normal speech  Normal gait         ED Course              ED Course as of 09/29/22 1239   Thu Sep 29, 2022   1141 EKG shows a normal sinus rhythm at a rate of 85.  Normal PA interval.  Normal QRS duration.  Normal QTC.  Normal axis.  No concerning ST segments.  There is no concerning T waves.  There is no evidence of ectopy, preexcitation, or ischemia.     Procedures              Critical Care time:  none               Results for orders placed or performed during the hospital encounter of 09/29/22 (from the past 24 hour(s))   UA with Microscopic reflex to Culture    Specimen: Urine, Midstream   Result Value Ref Range    Color Urine Straw Colorless, Straw, Light Yellow, Yellow    Appearance Urine Clear Clear    Glucose Urine Negative Negative mg/dL    Bilirubin Urine Negative Negative    Ketones Urine Negative Negative mg/dL    Specific Gravity Urine  1.004 1.003 - 1.035    Blood Urine Negative Negative    pH Urine 7.5 4.7 - 8.0    Protein Albumin Urine Negative Negative mg/dL    Urobilinogen Urine Normal Normal, 2.0 mg/dL    Nitrite Urine Negative Negative    Leukocyte Esterase Urine Negative Negative    Mucus Urine Present (A) None Seen /LPF    RBC Urine 1 <=2 /HPF    WBC Urine <1 <=5 /HPF    Squamous Epithelials Urine 0 <=1 /HPF    Narrative    Urine Culture not indicated   HCG qualitative urine (UPT)   Result Value Ref Range    hCG Urine Qualitative Negative Negative   CBC with platelets differential    Narrative    The following orders were created for panel order CBC with platelets differential.  Procedure                               Abnormality         Status                     ---------                               -----------         ------                     CBC with platelets and d...[[id number]]  Abnormal            Final result                 Please view results for these tests on the individual orders.   Comprehensive metabolic panel   Result Value Ref Range    Sodium 138 133 - 144 mmol/L    Potassium 3.7 3.4 - 5.3 mmol/L    Chloride 107 94 - 109 mmol/L    Carbon Dioxide (CO2) 26 20 - 32 mmol/L    Anion Gap 5 3 - 14 mmol/L    Urea Nitrogen 7 7 - 30 mg/dL    Creatinine 0.61 0.52 - 1.04 mg/dL    Calcium 9.3 8.5 - 10.1 mg/dL    Glucose 85 70 - 99 mg/dL    Alkaline Phosphatase 85 40 - 150 U/L    AST 7 0 - 45 U/L    ALT 19 0 - 50 U/L    Protein Total 7.7 6.8 - 8.8 g/dL    Albumin 4.4 3.4 - 5.0 g/dL    Bilirubin Total 0.5 0.2 - 1.3 mg/dL    GFR Estimate >90 >60 mL/min/1.73m2   Magnesium   Result Value Ref Range    Magnesium 2.1 1.6 - 2.3 mg/dL   CBC with platelets and differential   Result Value Ref Range    WBC Count 9.2 4.0 - 11.0 10e3/uL    RBC Count 5.13 3.80 - 5.20 10e6/uL    Hemoglobin 16.1 (H) 11.7 - 15.7 g/dL    Hematocrit 45.7 35.0 - 47.0 %    MCV 89 78 - 100 fL    MCH 31.4 26.5 - 33.0 pg    MCHC 35.2 31.5 - 36.5 g/dL    RDW 12.4 10.0 - 15.0  %    Platelet Count 237 150 - 450 10e3/uL    % Neutrophils 69 %    % Lymphocytes 22 %    % Monocytes 7 %    % Eosinophils 1 %    % Basophils 1 %    % Immature Granulocytes 0 %    NRBCs per 100 WBC 0 <1 /100    Absolute Neutrophils 6.4 1.6 - 8.3 10e3/uL    Absolute Lymphocytes 2.0 0.8 - 5.3 10e3/uL    Absolute Monocytes 0.6 0.0 - 1.3 10e3/uL    Absolute Eosinophils 0.1 0.0 - 0.7 10e3/uL    Absolute Basophils 0.1 0.0 - 0.2 10e3/uL    Absolute Immature Granulocytes 0.0 <=0.4 10e3/uL    Absolute NRBCs 0.0 10e3/uL   Extra Tube    Narrative    The following orders were created for panel order Extra Tube.  Procedure                               Abnormality         Status                     ---------                               -----------         ------                     Extra Blue Top Tube[[id number]]                              Final result               Extra Red Top Tube[[id number]]                               Final result                 Please view results for these tests on the individual orders.   Extra Blue Top Tube   Result Value Ref Range    Hold Specimen OK    Extra Red Top Tube   Result Value Ref Range    Hold Specimen OK    CT Head w/o Contrast    Narrative    PROCEDURE: CT HEAD W/O CONTRAST     HISTORY: persistent headache and dizziness.    COMPARISON: None.    TECHNIQUE:  Helical images of the head from the foramen magnum to the  vertex were obtained without contrast.    FINDINGS: The ventricles and sulci are normal in volume. No acute  intracranial hemorrhage, mass effect, midline shift, hydrocephalus or  basilar cystern effacement are present.    The grey-white matter interface is preserved.    The calvarium is intact. The mastoid air cells are clear.  The  visualized paranasal sinuses are clear.      Impression    IMPRESSION: Normal brain      ANETTE FORD MD         SYSTEM ID:  [id number]       Medications   meclizine (ANTIVERT) tablet 25 mg (25 mg Oral Given 9/29/22 1135)   acetaminophen  (TYLENOL) tablet 975 mg (975 mg Oral Given 9/29/22 1145)       Assessments & Plan (with Medical Decision Making)   This is a pleasant and talkative 32-year-old female who presented to the emergency department for an approximate 4-day history of vague dizziness and feelings of unsteadiness.  She has no difficulty speaking or walking.  She has no focal neurologic findings.  She has had some persistent mild headaches for the last several days.  Rather thorough work-up in the emergency department showed unrevealing laboratory findings as well as negative imaging of the brain.  Patient does tell me that she carries a family history of brain aneurysm.  She has a scheduled visit tomorrow in the clinic.  Discussed the multitude of etiologies that can cause subjective dizziness.  Trial meclizine.  COVID is part of the differential as well.  She does not appear to be suffering from an emergent or catastrophic etiology of the symptoms at this time.  The patient will return here for any new or worsening symptoms.    This document was prepared using a combination of typing and voice generated software.  While every attempt was made for accuracy, spelling and grammatical errors may exist.    I have reviewed the nursing notes.    I have reviewed the findings, diagnosis, plan and need for follow up with the patient.       New Prescriptions    No medications on file       Final diagnoses:   Dizziness       9/29/2022   [facility]     William Osborne PA-C  09/29/22 1241

## 2022-09-29 NOTE — DISCHARGE INSTRUCTIONS
"I could not find an etiology of your dizziness today in the emergency department.  This should not be construed as \"nothing is wrong.\"  Please follow-up in the clinic as scheduled tomorrow.  Rest and stay hydrated.  Advance diet as tolerated.  Return to this emergency department for any new or worsening symptoms.  "

## 2022-09-30 ENCOUNTER — OFFICE VISIT (OUTPATIENT)
Dept: FAMILY MEDICINE | Facility: OTHER | Age: 32
End: 2022-09-30
Attending: FAMILY MEDICINE
Payer: COMMERCIAL

## 2022-09-30 VITALS
TEMPERATURE: 98.4 F | RESPIRATION RATE: 12 BRPM | WEIGHT: 158 LBS | DIASTOLIC BLOOD PRESSURE: 62 MMHG | HEIGHT: 61 IN | OXYGEN SATURATION: 98 % | HEART RATE: 90 BPM | SYSTOLIC BLOOD PRESSURE: 110 MMHG | BODY MASS INDEX: 29.83 KG/M2

## 2022-09-30 DIAGNOSIS — R42 LIGHTHEADEDNESS: ICD-10-CM

## 2022-09-30 DIAGNOSIS — M62.838 MUSCLE SPASM: Primary | ICD-10-CM

## 2022-09-30 DIAGNOSIS — F17.210 CIGARETTE NICOTINE DEPENDENCE WITHOUT COMPLICATION: ICD-10-CM

## 2022-09-30 PROCEDURE — 99214 OFFICE O/P EST MOD 30 MIN: CPT | Performed by: FAMILY MEDICINE

## 2022-09-30 RX ORDER — DICLOFENAC SODIUM 75 MG/1
75 TABLET, DELAYED RELEASE ORAL 2 TIMES DAILY
Qty: 60 TABLET | Refills: 0 | Status: SHIPPED | OUTPATIENT
Start: 2022-09-30 | End: 2023-01-23

## 2022-09-30 RX ORDER — CYCLOBENZAPRINE HCL 10 MG
10 TABLET ORAL 3 TIMES DAILY PRN
Qty: 90 TABLET | Refills: 0 | Status: SHIPPED | OUTPATIENT
Start: 2022-09-30 | End: 2023-01-23

## 2022-09-30 ASSESSMENT — ENCOUNTER SYMPTOMS
COUGH: 0
NUMBNESS: 0
FACIAL SWELLING: 0
SHORTNESS OF BREATH: 0
CHILLS: 0
SINUS PAIN: 0
FATIGUE: 0
TREMORS: 0
SORE THROAT: 0
PARESTHESIAS: 0
SINUS PRESSURE: 0
PHOTOPHOBIA: 0
RHINORRHEA: 0
WEAKNESS: 0
PALPITATIONS: 0
ABDOMINAL PAIN: 0
EYE DISCHARGE: 0
FEVER: 0

## 2022-09-30 ASSESSMENT — ANXIETY QUESTIONNAIRES
1. FEELING NERVOUS, ANXIOUS, OR ON EDGE: NOT AT ALL
GAD7 TOTAL SCORE: 1
IF YOU CHECKED OFF ANY PROBLEMS ON THIS QUESTIONNAIRE, HOW DIFFICULT HAVE THESE PROBLEMS MADE IT FOR YOU TO DO YOUR WORK, TAKE CARE OF THINGS AT HOME, OR GET ALONG WITH OTHER PEOPLE: NOT DIFFICULT AT ALL
GAD7 TOTAL SCORE: 1
6. BECOMING EASILY ANNOYED OR IRRITABLE: NOT AT ALL
2. NOT BEING ABLE TO STOP OR CONTROL WORRYING: NOT AT ALL
3. WORRYING TOO MUCH ABOUT DIFFERENT THINGS: NOT AT ALL
5. BEING SO RESTLESS THAT IT IS HARD TO SIT STILL: NOT AT ALL
7. FEELING AFRAID AS IF SOMETHING AWFUL MIGHT HAPPEN: NOT AT ALL
4. TROUBLE RELAXING: SEVERAL DAYS

## 2022-09-30 ASSESSMENT — PAIN SCALES - GENERAL: PAINLEVEL: NO PAIN (0)

## 2022-09-30 ASSESSMENT — PATIENT HEALTH QUESTIONNAIRE - PHQ9: SUM OF ALL RESPONSES TO PHQ QUESTIONS 1-9: 2

## 2022-09-30 NOTE — NURSING NOTE
"Chief Complaint   Patient presents with     Dizziness       Initial /62 (BP Location: Left arm, Patient Position: Sitting, Cuff Size: Adult Regular)   Pulse 90   Temp 98.4  F (36.9  C)   Resp 12   Ht 1.549 m (5' 1\")   Wt 71.7 kg (158 lb)   SpO2 98%   BMI 29.85 kg/m   Estimated body mass index is 29.85 kg/m  as calculated from the following:    Height as of this encounter: 1.549 m (5' 1\").    Weight as of this encounter: 71.7 kg (158 lb).  Medication Reconciliation: complete  Molly Valladares RN    "

## 2022-10-05 ENCOUNTER — HOSPITAL ENCOUNTER (OUTPATIENT)
Dept: PHYSICAL THERAPY | Facility: HOSPITAL | Age: 32
Setting detail: THERAPIES SERIES
Discharge: HOME | End: 2022-10-05
Attending: FAMILY MEDICINE
Payer: COMMERCIAL

## 2022-10-05 DIAGNOSIS — M62.838 MUSCLE SPASM: ICD-10-CM

## 2022-10-05 DIAGNOSIS — R42 LIGHTHEADEDNESS: ICD-10-CM

## 2022-10-05 PROCEDURE — 97112 NEUROMUSCULAR REEDUCATION: CPT | Mod: GP

## 2022-10-05 PROCEDURE — 97110 THERAPEUTIC EXERCISES: CPT | Mod: GP

## 2022-10-05 PROCEDURE — 97161 PT EVAL LOW COMPLEX 20 MIN: CPT | Mod: GP

## 2022-10-05 NOTE — PROGRESS NOTES
"   10/05/22 0700   General Information   Type of Visit Initial OP Ortho PT Evaluation   Start of Care Date 10/05/22   Referring Physician Demario Dumont, DO   Patient/Family Goals Statement Pt's goal is to decrease dizziness symptoms when working and completing household tasks.   Orders Evaluate and Treat   Date of Order 09/30/22   Certification Required? No   Medical Diagnosis M62.838 (ICD-10-CM) - Muscle spasm  R42 (ICD-10-CM) - Lightheadedness   Surgical/Medical history reviewed Yes   Precautions/Limitations no known precautions/limitations       Present No   Body Part(s)   Body Part(s) Cervical Spine   Presentation and Etiology   Pertinent history of current problem (include personal factors and/or comorbidities that impact the POC) Pt presents with c/o neck pain, HA, and dizziness that she describes as \"feeling like she is seasick on a boat.\" Pt notes that she feels significant tightness in the neck/upper thoracic muscles that she thinks are contributing to HA and dizzy symptoms. Pt denies any recent balance difficulties.   Impairments Q. Dizziness;A. Pain   Functional Limitations perform activities of daily living   Symptom Location Pt describes pain in the cervical spine and upper thoracic spine, with tightness noted in the areas of upper traps, rhomboids, and levator scaps bilaterally (right side worse than left).   How/Where did it occur From insidious onset   Onset date of current episode/exacerbation 09/28/22   Chronicity New   Pain rating (0-10 point scale) Best (/10);Worst (/10)   Best (/10) 4   Worst (/10) 6   Pain quality B. Dull;C. Aching   Frequency of pain/symptoms A. Constant   Pain/symptoms are: Worse in the morning   Pain/symptoms exacerbated by K. Home tasks   Pain/symptoms eased by C. Rest   Progression of symptoms since onset: Unchanged   Current / Previous Interventions   Diagnostic Tests: CT scan   CT Results Results   CT results PROCEDURE: CT HEAD W/O CONTRAST      " HISTORY: persistent headache and dizziness.     COMPARISON: None.     TECHNIQUE:  Helical images of the head from the foramen magnum to the  vertex were obtained without contrast.     FINDINGS: The ventricles and sulci are normal in volume. No acute  intracranial hemorrhage, mass effect, midline shift, hydrocephalus or  basilar cystern effacement are present.     The grey-white matter interface is preserved.     The calvarium is intact. The mastoid air cells are clear.  The  visualized paranasal sinuses are clear.                                                                      IMPRESSION: Normal brain       ANETTE FORD MD   Prior Level of Function   Prior Level of Function-Mobility IND   Prior Level of Function-ADLs IND   Current Level of Function   Patient role/employment history A. Employed   Employment Comments Works for a [employer] company, doing computer work at a desk.   [facility]   Current equipment-Gait/Locomotion None   Current equipment-ADL None   Fall Risk Screen   Fall screen completed by PT   Have you fallen 2 or more times in the past year? No   Have you fallen and had an injury in the past year? No   Timed Up and Go score (seconds) NT   Is patient a fall risk? No   Cervical Spine   Cervical Left Side Bending ROM WNL   Cervical Right Rotation ROM WNL   Cervical Left Rotation ROM WNL   Cervical Flexion ROM WNL   Cervical Extension ROM WNL   Cervical Right Side Bending ROM WNL   Thoracic Flexion ROM WNL   Thoracic Extension ROM WNL   Thoracic Right Side Bending ROM WNL   Thoracic Left Sidebending ROM  WNL   Thoracic Right Rotation WNL   Thoracic Left Rotation WNL   Upper Trapezius Flexibility Tightness bilaterally   Levator Scapula Flexibility Tightness bilaterally   Spurling Test Neg   Neck Flexor Endurance Test (normal 39 sec) Impaired to less than 20 seconds due to SCM activation   Cervical Rotation/Lateral Flexion Test Neg   UE Neural Tension UE Neural Tension  Tests   Posture Pt sits in a slightly forward flexed posture with rounded shoulders.   ULTT I (Median and Anterior Interosseous) Negative   ULTT II (Median and Musculocutaneous and Axillary) Negative   ULTT III (Radial) Negative   ULTT IV (Ulnar) Negative   Planned Therapy Interventions   Planned Therapy Interventions ADL retraining;joint mobilization;manual therapy;motor coordination training;strengthening;stretching   Planned Modality Interventions   Planned Modality Interventions Cryotherapy;Hot packs   Clinical Impression   Criteria for Skilled Therapeutic Interventions Met yes, treatment indicated   PT Diagnosis Signs and symptoms consistent with cervicogenic dizziness with cervical myofascial drivers   Influenced by the following impairments Soft tissue mobility, motor coordination, and oculomotor tasks   Functional limitations due to impairments Household tasks including house cleaning, and concentration with work tasks   Clinical Presentation Stable/Uncomplicated   Clinical Decision Making (Complexity) Low complexity   Therapy Frequency 1 time/week   Predicted Duration of Therapy Intervention (days/wks) Up to 8 weeks   Risk & Benefits of therapy have been explained Yes   Patient, Family & other staff in agreement with plan of care Yes   Education Assessment   Preferred Learning Style Listening;Pictures/video;Demonstration   Barriers to Learning No barriers   ORTHO GOALS   PT Ortho Eval Goals 1;2;3   Ortho Goal 1   Goal Identifier STG #1   Goal Description Pt will be independent with HEP in order to maximize therapy benefits.   Target Date 10/19/22   Ortho Goal 2   Goal Identifier STG #2   Goal Description Pt will report 50% improvement in dizziness symptoms in order to improve concentration at work without dizziness.   Target Date 10/26/22   Ortho Goal 3   Goal Identifier LTG #1   Goal Description Pt will improve deep cervical flexor endurance to 30 seconds without SCM activation in order to improve postural  control at work.   Total Evaluation Time   PT Eval, Low Complexity Minutes (49164) 30      10/05/22 0800   Quick Adds   Quick Adds Vestibular Eval   Oculomotor Exam   Smooth Pursuit Normal   Saccades Normal   VOR Abnormal   VOR Comments Provokes dizziness symptoms; no nystagmus   VOR Cancellation Abnormal   VOR Cancellation Comments Provokes dizziness symptoms; no nystagmus   Rapid Head Thrust Normal   Convergence Testing Normal   Infrared Goggle Exam or Frenzel Lense Exam   [place]-Hallpike (right) Negative   [place]-Hallpike (Left) Negative

## 2022-10-10 ENCOUNTER — HOSPITAL ENCOUNTER (OUTPATIENT)
Dept: PHYSICAL THERAPY | Facility: HOSPITAL | Age: 32
Setting detail: THERAPIES SERIES
Discharge: HOME | End: 2022-10-10
Attending: FAMILY MEDICINE
Payer: COMMERCIAL

## 2022-10-10 PROCEDURE — 97110 THERAPEUTIC EXERCISES: CPT | Mod: GP

## 2022-10-10 PROCEDURE — 97140 MANUAL THERAPY 1/> REGIONS: CPT | Mod: GP

## 2022-10-14 ENCOUNTER — HOSPITAL ENCOUNTER (OUTPATIENT)
Dept: PHYSICAL THERAPY | Facility: HOSPITAL | Age: 32
Setting detail: THERAPIES SERIES
Discharge: HOME | End: 2022-10-14
Attending: FAMILY MEDICINE
Payer: COMMERCIAL

## 2022-10-14 PROCEDURE — 97112 NEUROMUSCULAR REEDUCATION: CPT | Mod: GP

## 2022-10-14 PROCEDURE — 97140 MANUAL THERAPY 1/> REGIONS: CPT | Mod: GP

## 2022-10-24 ENCOUNTER — HOSPITAL ENCOUNTER (OUTPATIENT)
Dept: PHYSICAL THERAPY | Facility: HOSPITAL | Age: 32
Setting detail: THERAPIES SERIES
Discharge: HOME | End: 2022-10-24
Attending: FAMILY MEDICINE
Payer: COMMERCIAL

## 2022-10-24 PROCEDURE — 97535 SELF CARE MNGMENT TRAINING: CPT | Mod: GP

## 2022-10-24 PROCEDURE — 97140 MANUAL THERAPY 1/> REGIONS: CPT | Mod: GP

## 2022-10-25 ENCOUNTER — HOSPITAL ENCOUNTER (OUTPATIENT)
Dept: PHYSICAL THERAPY | Facility: HOSPITAL | Age: 32
Setting detail: THERAPIES SERIES
Discharge: HOME | End: 2022-10-25
Attending: FAMILY MEDICINE
Payer: COMMERCIAL

## 2022-10-25 PROCEDURE — 97140 MANUAL THERAPY 1/> REGIONS: CPT | Mod: GP

## 2022-11-02 ENCOUNTER — MEDICAL CORRESPONDENCE (OUTPATIENT)
Dept: HEALTH INFORMATION MANAGEMENT | Facility: CLINIC | Age: 32
End: 2022-11-02

## 2022-11-11 NOTE — PROGRESS NOTES
"  Assessment & Plan   Problem List Items Addressed This Visit    None  Visit Diagnoses     Dizziness    -  Primary    Relevant Orders    Adult ENT  Referral    Intractable migraine with aura with status migrainosus        Relevant Medications    SUMAtriptan (IMITREX) 25 MG tablet    Benign paroxysmal positional vertigo, unspecified laterality        Relevant Orders    Adult ENT  Referral             20 minutes spent on the date of the encounter doing chart review, review of test results, interpretation of tests, patient visit and documentation        BMI:   Estimated body mass index is 30.23 kg/m  as calculated from the following:    Height as of 9/30/22: 1.549 m (5' 1\").    Weight as of this encounter: 72.6 kg (160 lb).           No follow-ups on file.    Trever Mckeon, [facility] Bell is a 32 year old, presenting for the following health issues:  Headache      VANESSA Bell presents today due to dizziness and migraines.  She states that in September she began to experience severe head aches which were followed buy prolonged periods(weeks) of dizziness.  She denies the dizziness being like the room is spinning but instead reports it is like being on a boat.  Then after several week this dizziness stopped and eventually returned following another head ache.  She was seen in the ER with this with normal labs and normal CT head.  She was also seen by Dr. Dumont for this with recommendation for vestibular rehab.  She was given Meclizine but has not used this.    Currently she denies any HA but states she had a HA several weeks ago and remains \"dizzy\".    Migraine     Since your last clinic visit, how have your headaches changed?  Improved    How often are you getting headaches or migraines? No pattern, Multiple in the beginning of September 2022.     Are you able to do normal daily activities when you have a migraine? No    Are you taking " rescue/relief medications? (Select all that apply) No    How helpful is your rescue/relief medication?  Patient has not been prescribed rescue medication     Are you taking any medications to prevent migraines? (Select all that apply)  No    In the past 4 weeks, how often have you gone to urgent care or the emergency room because of your headaches?  1        Dizziness  Onset/Duration: 9/22/22  Description:   Do you feel faint: No  Does it feel like the surroundings (bed, room) are moving: No  Unsteady/off balance: No  Have you passed out or fallen: No  Intensity: moderate  Progression of Symptoms: same  Accompanying Signs & Symptoms:  Heart palpitations or chest pain: No  Nausea, vomiting: No  Weakness or lack of coordination in arms or legs: No  Vision or speech changes: No  Numbness or tingling: No  Ringing in ears (Tinnitus): No  Hearing Loss: No  History:   Head trauma/concussion history: No  Previous similar symptoms: No  Recent bleeding history: No  Any new medications (BP?): No  Precipitating factors:   Worse with activity: YES  Worse with head movement: YES  Alleviating factors:   Does staying in a fixed position give relief: YES  Therapies tried and outcome: accupuncture ([facility]-ordered by Dr. Dumont), stretching       Review of Systems   Constitutional, HEENT, cardiovascular, pulmonary, gi and gu systems are negative, except as otherwise noted.      Objective    /74 (BP Location: Right arm, Patient Position: Sitting, Cuff Size: Adult Regular)   Pulse 100   Temp 98.6  F (37  C) (Tympanic)   Resp 18   Wt 72.6 kg (160 lb)   SpO2 98%   BMI 30.23 kg/m    Body mass index is 30.23 kg/m .  Physical Exam   GENERAL: healthy, alert and no distress  EYES: Eyes grossly normal to inspection, PERRL and conjunctivae and sclerae normal.   Dizziness worsened when objects moved in closer and out further in her visual field.  She did have some mild lateral nystagmus bilaterally.    NECK: no adenopathy, no  asymmetry, masses, or scars and thyroid normal to palpation  NEURO: No lateralizing deficits or ataxic gait.    PSYCH: mentation appears normal, affect normal/bright    Admission on 09/29/2022, Discharged on 09/29/2022   Component Date Value Ref Range Status     Sodium 09/29/2022 138  133 - 144 mmol/L Final     Potassium 09/29/2022 3.7  3.4 - 5.3 mmol/L Final     Chloride 09/29/2022 107  94 - 109 mmol/L Final     Carbon Dioxide (CO2) 09/29/2022 26  20 - 32 mmol/L Final     Anion Gap 09/29/2022 5  3 - 14 mmol/L Final     Urea Nitrogen 09/29/2022 7  7 - 30 mg/dL Final     Creatinine 09/29/2022 0.61  0.52 - 1.04 mg/dL Final     Calcium 09/29/2022 9.3  8.5 - 10.1 mg/dL Final     Glucose 09/29/2022 85  70 - 99 mg/dL Final     Alkaline Phosphatase 09/29/2022 85  40 - 150 U/L Final     AST 09/29/2022 7  0 - 45 U/L Final     ALT 09/29/2022 19  0 - 50 U/L Final     Protein Total 09/29/2022 7.7  6.8 - 8.8 g/dL Final     Albumin 09/29/2022 4.4  3.4 - 5.0 g/dL Final     Bilirubin Total 09/29/2022 0.5  0.2 - 1.3 mg/dL Final     GFR Estimate 09/29/2022 >90  >60 mL/min/1.73m2 Final    Effective December 21, 2021 eGFRcr in adults is calculated using the 2021 CKD-EPI creatinine equation which includes age and gender (Familia et al., NEJ, DOI: 10.1056/MZJYlg7678548)     Magnesium 09/29/2022 2.1  1.6 - 2.3 mg/dL Final     Color Urine 09/29/2022 Straw  Colorless, Straw, Light Yellow, Yellow Final     Appearance Urine 09/29/2022 Clear  Clear Final     Glucose Urine 09/29/2022 Negative  Negative mg/dL Final     Bilirubin Urine 09/29/2022 Negative  Negative Final     Ketones Urine 09/29/2022 Negative  Negative mg/dL Final     Specific Gravity Urine 09/29/2022 1.004  1.003 - 1.035 Final     Blood Urine 09/29/2022 Negative  Negative Final     pH Urine 09/29/2022 7.5  4.7 - 8.0 Final     Protein Albumin Urine 09/29/2022 Negative  Negative mg/dL Final     Urobilinogen Urine 09/29/2022 Normal  Normal, 2.0 mg/dL Final     Nitrite Urine  09/29/2022 Negative  Negative Final     Leukocyte Esterase Urine 09/29/2022 Negative  Negative Final     Mucus Urine 09/29/2022 Present (A)  None Seen /LPF Final     RBC Urine 09/29/2022 1  <=2 /HPF Final     WBC Urine 09/29/2022 <1  <=5 /HPF Final     Squamous Epithelials Urine 09/29/2022 0  <=1 /HPF Final     hCG Urine Qualitative 09/29/2022 Negative  Negative Final    This test is for screening purposes.  Results should be interpreted along with the clinical picture.  Confirmation testing is available if warranted by ordering [id number], HCG Quantitative Pregnancy.     WBC Count 09/29/2022 9.2  4.0 - 11.0 10e3/uL Final     RBC Count 09/29/2022 5.13  3.80 - 5.20 10e6/uL Final     Hemoglobin 09/29/2022 16.1 (H)  11.7 - 15.7 g/dL Final     Hematocrit 09/29/2022 45.7  35.0 - 47.0 % Final     MCV 09/29/2022 89  78 - 100 fL Final     MCH 09/29/2022 31.4  26.5 - 33.0 pg Final     MCHC 09/29/2022 35.2  31.5 - 36.5 g/dL Final     RDW 09/29/2022 12.4  10.0 - 15.0 % Final     Platelet Count 09/29/2022 237  150 - 450 10e3/uL Final     % Neutrophils 09/29/2022 69  % Final     % Lymphocytes 09/29/2022 22  % Final     % Monocytes 09/29/2022 7  % Final     % Eosinophils 09/29/2022 1  % Final     % Basophils 09/29/2022 1  % Final     % Immature Granulocytes 09/29/2022 0  % Final     NRBCs per 100 WBC 09/29/2022 0  <1 /100 Final     Absolute Neutrophils 09/29/2022 6.4  1.6 - 8.3 10e3/uL Final     Absolute Lymphocytes 09/29/2022 2.0  0.8 - 5.3 10e3/uL Final     Absolute Monocytes 09/29/2022 0.6  0.0 - 1.3 10e3/uL Final     Absolute Eosinophils 09/29/2022 0.1  0.0 - 0.7 10e3/uL Final     Absolute Basophils 09/29/2022 0.1  0.0 - 0.2 10e3/uL Final     Absolute Immature Granulocytes 09/29/2022 0.0  <=0.4 10e3/uL Final     Absolute NRBCs 09/29/2022 0.0  10e3/uL Final     Hold Specimen 09/29/2022 OK   Final     Hold Specimen 09/29/2022 OK   Final     No results found for any visits on 11/14/22.  No results found for this or any previous  visit (from the past 24 hour(s)).

## 2022-11-14 ENCOUNTER — OFFICE VISIT (OUTPATIENT)
Dept: INTERNAL MEDICINE | Facility: OTHER | Age: 32
End: 2022-11-14
Attending: INTERNAL MEDICINE
Payer: COMMERCIAL

## 2022-11-14 VITALS
RESPIRATION RATE: 18 BRPM | WEIGHT: 160 LBS | OXYGEN SATURATION: 98 % | BODY MASS INDEX: 30.23 KG/M2 | HEART RATE: 100 BPM | SYSTOLIC BLOOD PRESSURE: 106 MMHG | TEMPERATURE: 98.6 F | DIASTOLIC BLOOD PRESSURE: 74 MMHG

## 2022-11-14 DIAGNOSIS — H91.93 DECREASED HEARING OF BOTH EARS: Primary | ICD-10-CM

## 2022-11-14 DIAGNOSIS — R42 DIZZINESS: Primary | ICD-10-CM

## 2022-11-14 DIAGNOSIS — H81.10 BENIGN PAROXYSMAL POSITIONAL VERTIGO, UNSPECIFIED LATERALITY: ICD-10-CM

## 2022-11-14 DIAGNOSIS — G43.111 INTRACTABLE MIGRAINE WITH AURA WITH STATUS MIGRAINOSUS: ICD-10-CM

## 2022-11-14 PROCEDURE — 99213 OFFICE O/P EST LOW 20 MIN: CPT | Performed by: INTERNAL MEDICINE

## 2022-11-14 RX ORDER — SUMATRIPTAN 25 MG/1
25 TABLET, FILM COATED ORAL
Qty: 30 TABLET | Refills: 0 | Status: SHIPPED | OUTPATIENT
Start: 2022-11-14 | End: 2023-02-10

## 2022-11-14 ASSESSMENT — PAIN SCALES - GENERAL: PAINLEVEL: NO PAIN (0)

## 2022-11-22 ENCOUNTER — DOCUMENTATION ONLY (OUTPATIENT)
Dept: SLEEP MEDICINE | Facility: HOSPITAL | Age: 32
End: 2022-11-22

## 2022-11-23 NOTE — PROGRESS NOTES
JIMBO BUTLER       Name: Arabella Desai MRN# [id number]   Age: 32 year old YOB: 1990     Stop Bang questionnaire completed with a score of >3 to allow for HST     Have you been told you snore loudly (louder than talking or loud enough to be heard through doors)? YES    Do you often feel tired, fatigued, or sleepy during the daytime? YES    Has anyone observed you stop breathing during your sleep? NO    Do you have or are you being treated for high blood pressure? NO    Is your BMI greater than 35? NO    Is your neck size circumference 16 inches or greater? NO    Are you over 50 years old? NO    Stop Bang Score (# of yes): 2

## 2022-11-23 NOTE — PROGRESS NOTES
SLEEP HISTORY QUESTIONNAIRE    Please describe the main reason for your sleep appointment? snoring    How long has this been a problem? A few years    Have you been diagnosed with a sleep problem in the past? NO    If so, what? n/a    What treatment was recommended? n/a    Have you had a sleep study in the past? NO    If yes, where and when? n/a    Sleep Habits:   Do you read in bed? No  Do you eat in bed? No  Do you watch TV in bed? Yes  Do you work in bed? No  Do you use a phone or computer in bed? Yes    Is you sleep disturbed by:   Bed partner: Yes  Children: Yes  Noise: Yes   Pets: Yes  Other: no      On two or more nights per week, do you drink alcohol to help you fall asleep?NO    On two or more nights per week, do you take melatonin to help you fall asleep? NO    On two or more nights per week, do you take over the counter medicine to fall asleep?  NO    Do you take drinks with caffeine (coffee, tea, soda, energy drinks)? YES    Do you have 3 or more caffeine drinks in a day? NO    Do you have caffeine drinks within 6 hours of bedtime? NO    Do you smoke or use tobacco? YES    Do you exercise? YES    Sleep Routine:   Using a 24 Hour Clock    What time do you usually get into bed on workdays? 9pm    Weekend/non work days? 11pm    What time do you get out of bed on workdays? 530am      Weekend/non work days?8am    Do you work the evening or night shift or do your shifts rotate? NO    How long does it usually take to fall to sleep? 15 min    How many times do you wake during the night? 1    How much time do you feel that you are awake during the entire night? 1 hour    How long does it take for you to fall back to sleep after you wake up? 20 min    Why do you think you wake up? Bathroom, kids, dogs,  shift work    What do you do when you wake up? Deal with above and go back to bed    How much sleep do you think you get on work nights? 6-7    How much sleep do you think you get on weekends/non work days?  8-9    How much sleep do you think you need to feel your best? 8-9    How many days during a week do you take a nap on average? 1    What is the average length of your naps? 1-2 hours    Do you feel better after taking a nap? YES    If you could chose the best sleep schedule for you, what time would you go to bed? 8pm  What time would you get up? 7am    Do you read in bed? NO    Do you eat in bed? NO    Do you watch TV in bed? YES    Do you do work in bed? NO    Do you use a computer or phone in bed? YES    Sleep Disruptions?   Leg movements:  Do you ever have restless, crawling, aching or other unusual feelings in your legs? YES    Do you ever wake yourself by kicking your legs during the night? NO    Are the sheets and blankets messed up or tossed about when you get up? NO    Night-time behaviors:   Do you have nightmares or night terrors? NO   How often? n/a    Have you had times when you were sleep walking? NO    Have you been seen doing anything unusual while you sleep at nights? YES  What? Talk, hit, tickle  How often? 2-3/night    Have you ever hurt yourself or someone else while you were sleeping? NO  Please describe: n/a    Do you clench or grind your teeth during the night? yes    Sleep Apnea (pauses in breathing during sleep):  Do you wake with a headache in the morning? YES  How often? 1/week    Does your bed partner, family or friends ever say that you snore? YES  How many nights per week do you snore? Loud, frequent  Can snoring be heard outside the bedroom? yes    Do you ever wake yourself up from snoring, gasping or choking? NO    Have you ever been told that you stop breathing or have pauses in your breathing? NO    Do you wake in the morning with a dry throat or mouth? YES    Do you have trouble breathing through your nose? NO    Do you have problems with heartburn, reflux or a hiatal hernia? YES    Which positions do you usually sleep in? (stomach, back, sides, all) stomach, sides    Do you use  oxygen or any other medical equipment when you sleep? NO    Do members of your family (related by blood) snore? YES    Have any members of your family been diagnosed with with sleep apnea? NO    Do other members of your family have restless leg? NO    Do other members of your family have sleep walking? NO    Have you ever had an accident, or near accident due to sleepiness while driving? NO    Does your sleepiness affect your work on the job or at school? YES    Do you ever fall asleep by accident while doing a task? NO    Have you had sudden muscle weakness when laughing, angry or surprised? NO    Have you ever been unable to move your body when falling asleep or waking up? NO    Do you ever have trouble  your dreams from real life events? NO  Please describe: n/a    Physical Health: (including illness and injury): During the past 30 days, on how many days was your physical health not good? 30 days     Mental Health: (including stress, depression, and problems with emotions): During the last 30 days, how may days was your mental health not good? 030 days.     During the past 30 days, on how many days did poor physical or mental health keep you from doing your usual activities? This might be self-care, work, or play? 10/30 days.     Social History:   Marital status:     Who lives in your home with you? , 3 kids    Mother (alive or dead)? alive If has , from what? n/a  Father (alive or dead)? alive If has , from what? n/a    Siblings: YES  Have any ? NO  If so, from what? n/a    Currently working? YES  If yes, work: [employer]  Former jobs: [employer]     Sleepiness Scale:   Sitting and reading 1   Watching TV 0   Sitting in a public place 0   Riding in a car 0   Lying down to rest in the afternoon 3   Sitting and talking to someone 0   Sitting quietly after a lunch without alcohol 0   In a car, stopping for a few minutes in traffic 0       Surgical History:   Past Surgical  History:   Procedure Laterality Date     CYSTOSCOPY         Medical Conditions:   Past Medical History:   Diagnosis Date     Chronic interstitial cystitis 2011     Supervision of other normal pregnancy, antepartum 2015    STRETCH Dr. Gordy RODRIGUEZ (spontaneous vaginal delivery) 2016    Dean old scar      Tobacco use disorder 2012       Medications:   Current Outpatient Medications   Medication Sig     cyclobenzaprine (FLEXERIL) 10 MG tablet Take 1 tablet (10 mg) by mouth 3 times daily as needed for muscle spasms     diclofenac (VOLTAREN) 75 MG EC tablet Take 1 tablet (75 mg) by mouth 2 times daily     levonorgestrel (MIRENA) 20 MCG/24HR IUD 1 each by Intrauterine route once     SUMAtriptan (IMITREX) 25 MG tablet Take 1 tablet (25 mg) by mouth at onset of headache for migraine May repeat in 2 hours. Max 8 tablets/24 hours.     No current facility-administered medications for this visit.       Are you currently having any of the following symptoms?   General:   Obvious weight gain or loss NO  Fever, chills or sweats NO  Drug allergies: no    Eyes:   Changes in vision NO  Blind spots NO  Double vision YES  Other no    Ear, Nose and Throat:   Ear pain NO  Sore throat NO  Sinus pain NO  Post-nasal drip NO  Runny nose NO  Bloody nose NO    Heart:   Rapid or irregular heart beat NO  Chest pain or pressure NO  Out of breath when lying down NO  Swelling in feet or legs NO  High blood pressure NO  Heart disease NO    Nervous system   Headaches YES  Weakness in arms or legs NO  Numbness in arms of legs YES  Other: no    Skin  Rashes NO  New moles or skin changes NO  Other no    Lungs  Shortness of breath at rest NO  Shortness of breath with activity NO  Dry cough NO  Coughing up mucous or phlegm NO  Coughing up blood NO  Wheezing when breathing NO    Lymph System  Swollen lymph nodes NO  New lumps or bumps NO  Changes in breasts or discharge NO    Digestive System   Nausea or vomiting NO  Loose or  watery stools YES  Hard, dry stools (constipation) YES  Fat or grease in stools NO  Blood in stools NO  Stools are black or bloody NO  Abdominal (belly) pain NO    Urinary Tract   Pain when you urinate (pee) NO  Blood in your urine NO  Urinate (pee) more than normal NO  Irregular periods NO    Muscles and bones   Muscle pain YES  Joint or bone pain NO  Swollen joints NO  Other no    Glands  Increased thirst or urination NO  Diabetes NO  Morning glucose: no  Afternoon glucose: no    Mental Health  Depression NO  Anxiety NO  Other mental health issues: no

## 2022-11-30 ASSESSMENT — SLEEP AND FATIGUE QUESTIONNAIRES
HOW LIKELY ARE YOU TO NOD OFF OR FALL ASLEEP IN A CAR, WHILE STOPPED FOR A FEW MINUTES IN TRAFFIC: WOULD NEVER DOZE
HOW LIKELY ARE YOU TO NOD OFF OR FALL ASLEEP WHILE SITTING AND READING: SLIGHT CHANCE OF DOZING
HOW LIKELY ARE YOU TO NOD OFF OR FALL ASLEEP WHEN YOU ARE A PASSENGER IN A CAR FOR AN HOUR WITHOUT A BREAK: SLIGHT CHANCE OF DOZING
HOW LIKELY ARE YOU TO NOD OFF OR FALL ASLEEP WHILE SITTING QUIETLY AFTER LUNCH WITHOUT ALCOHOL: WOULD NEVER DOZE
HOW LIKELY ARE YOU TO NOD OFF OR FALL ASLEEP WHILE SITTING INACTIVE IN A PUBLIC PLACE: WOULD NEVER DOZE
HOW LIKELY ARE YOU TO NOD OFF OR FALL ASLEEP WHILE SITTING AND TALKING TO SOMEONE: WOULD NEVER DOZE
HOW LIKELY ARE YOU TO NOD OFF OR FALL ASLEEP WHILE WATCHING TV: SLIGHT CHANCE OF DOZING
HOW LIKELY ARE YOU TO NOD OFF OR FALL ASLEEP WHILE LYING DOWN TO REST IN THE AFTERNOON WHEN CIRCUMSTANCES PERMIT: HIGH CHANCE OF DOZING

## 2022-11-30 NOTE — PROGRESS NOTES
"Chart review prior to sleep testing.    Patient Summary:  32 year old yo female who is referred for snoring.    Patient Active Problem List    Diagnosis Date Noted     Duodenal ulcer due to Helicobacter pylori 06/01/2020     Priority: Medium     Slow transit constipation 06/24/2019     Priority: Medium     Other acute gastritis without hemorrhage 06/24/2019     Priority: Medium     Chronic cough 11/08/2017     Priority: Medium     Glucose intolerance of pregnancy 11/08/2017     Priority: Medium     EFW 38 wks       Chronic seasonal allergic rhinitis due to pollen 08/08/2017     Priority: Medium     Encounter for birth control 07/27/2016     Priority: Medium     History of depression 12/30/2015     Priority: Medium     Grief and postpartum. Patient believes pp depression may have been associated with breast feeding aggression from nurses       Need for influenza vaccination 12/02/2015     Priority: Medium     Defers 11/8/17         Proteinuria 12/02/2015     Priority: Medium     Tobacco use disorder 05/17/2012     Priority: Medium     Pneumovax done 7/10/17         Current Outpatient Medications   Medication     cyclobenzaprine (FLEXERIL) 10 MG tablet     diclofenac (VOLTAREN) 75 MG EC tablet     levonorgestrel (MIRENA) 20 MCG/24HR IUD     SUMAtriptan (IMITREX) 25 MG tablet     No current facility-administered medications for this visit.       Pertinent PMHx of obesity.    STOP-BANG score of 2, with unknown neck circumference.  Wheeling score of 4.  BMI of Estimated body mass index is 30.23 kg/m  as calculated from the following:    Height as of 9/30/22: 1.549 m (5' 1\").    Weight as of 11/14/22: 72.6 kg (160 lb).     Per questionnaire: \"snoring\"    Caffeine use:  No for 3+ per day.  No for within 6 hours of bed.    Tobacco use: Yes    Sleep pattern:  Workdays.  9pm - 5:30am, total sleep time 6-7 hours.  Weekends.  11pm - 8am, total sleep time 8-9 hours.  Time to fall asleep: ~15 minutes.  Awakenings: 1 times per " night, 20 minutes to return to sleep, awake for total of 1 hours per night.  Napping.  1 days per week, 1-2 hours per nap.    Yes for RLS screen.  No for sleep walking.  No for dream enactment behavior.  Yes for bruxism.    Yes for morning headaches.  Yes for snoring.  No for observed apnea.  No for FHx of FAUSTO.    SHx:  , lives with  and 3 children.  Works for [employer]    A/P:    1.)  Low pretest likelihood of FAUSTO with STOP-BANG score of 2.   - Would appear to be candidate for either home sleep testing or in-lab PSG, with preference for in-lab PSG given low STOPBANG.    ---  This note was written with the assistance of the Dragon voice-dictation technology software. The final document, although reviewed, may contain errors. For corrections, please contact the office.    Daquan Pwoell MD    Sleep Medicine    [place], [place]  o Main Office: [phone]    [facility] [place], [place]  o [address], [address]  o Schedule visits: [phone]  o Main Office: [phone]  o Fax: [phone]

## 2022-11-30 NOTE — PROGRESS NOTES
Arabella Desai is a 32 year old female who is being evaluated via a billable telephone visit.       What phone number would you like to be contacted at?@ [phone]  Home Phone [phone]   Work Phone Not on file.   Mobile [phone]       How would you like to obtain your AVS? [employer]       Telephone Virtual Visit Details     Type of service:  Telephone Virtual Visit     Start Time: 2pm  End Time: 2:30pm    Virtual visit for Snoring.     Assessment / Plan:    1.)  Primary snoring  - Low clinical concern for FAUSTO with STOPBANG score of ~1  - Discussed options for treatment of primary snoring including mandibular advancement devices, positional therapy, nasal fluticasone, oral montelukast.  - She will consider options, I sent [employer] outlining options.      SUBJECTIVE:  Arabella Desai is a 32 year old female who presents with long standing history of snoring which has become less tolerable by spouse in last 6 mos-year. Unknown if snoring is loud enough to hear from outside room. Denies witnessed apnea. Describes feeling tired in morning, but no headaches or excessive day time sleepiness. Does admit spouse mentions positional component of snoring (snoring ceases or decreases when head is elevated). Has attempted nasal strips but did not find effective nor like left over residue.     Family hx of snoring in mother occasionally and mother diagnosed with RLS. Denies personal history of RLS (numbness or tingling in legs while sleeping). Denies personal history of sleeping talking or sleeping walking.     Sleep routine includes heading to bed between 8-9pm. Some bedtime TV, but no overnight use. Falls asleep within 10-15 mins, endorses no problem with sleep initiation. Wakes 1-2 times a night for bathroom use. Describes no difficulty with returning to sleep. Does not engage in activities or stimulating behaviors when woken up. Typically wakes for night day at 5:30 am.     Caffeine use includes 1 cup of coffee in  "the morning before work. Has mild fatigue in afternoon. Does not take naps. Daytime commitments include full-time job and three kids.     32 year old yo female who is referred for snoring. Major concern is improving sleep for spouse who is light sleeper and has shift work.     Pertinent PMHx of obesity.     STOP-BANG score of 2, with unknown neck circumference.  Redmond score of 4.  BMI of Estimated body mass index is 30.23 kg/m  as calculated from the following:    Height as of 9/30/22: 1.549 m (5' 1\").    Weight as of 11/14/22: 72.6 kg (160 lb).      Per questionnaire: \"snoring\"     Caffeine use:  No for 3+ per day.  No for within 6 hours of bed.     Tobacco use: Yes     Sleep pattern:  Workdays.  9pm - 5:30am, total sleep time 6-7 hours.  Weekends.  11pm - 8am, total sleep time 8-9 hours.  Time to fall asleep: ~15 minutes.  Awakenings: 1 times per night, 20 minutes to return to sleep, awake for total of 1 hours per night.  Napping.  1 days per week, 1-2 hours per nap.     Yes for RLS screen.  No for sleep walking.  No for dream enactment behavior.  Yes for bruxism.     Yes for morning headaches.  Yes for snoring.  No for observed apnea.  No for FHx of FAUSTO.     SHx:  , lives with  and 3 children.  Works for [employer]        Past medical history:    Patient Active Problem List    Diagnosis Date Noted     Duodenal ulcer due to Helicobacter pylori 06/01/2020     Priority: Medium     Slow transit constipation 06/24/2019     Priority: Medium     Other acute gastritis without hemorrhage 06/24/2019     Priority: Medium     Chronic cough 11/08/2017     Priority: Medium     Glucose intolerance of pregnancy 11/08/2017     Priority: Medium     EFW 38 wks       Chronic seasonal allergic rhinitis due to pollen 08/08/2017     Priority: Medium     Encounter for birth control 07/27/2016     Priority: Medium     History of depression 12/30/2015     Priority: Medium     Grief and postpartum. Patient believes pp " depression may have been associated with breast feeding aggression from nurses       Need for influenza vaccination 12/02/2015     Priority: Medium     Defers 11/8/17         Proteinuria 12/02/2015     Priority: Medium     Tobacco use disorder 05/17/2012     Priority: Medium     Pneumovax done 7/10/17         10 point ROS of systems including Constitutional, Eyes, Respiratory, Cardiovascular, Gastroenterology, Genitourinary, Integumentary, Muscularskeletal, Psychiatric were all negative except for pertinent positives noted in my HPI.    Current Outpatient Medications   Medication Sig Dispense Refill     cyclobenzaprine (FLEXERIL) 10 MG tablet Take 1 tablet (10 mg) by mouth 3 times daily as needed for muscle spasms 90 tablet 0     diclofenac (VOLTAREN) 75 MG EC tablet Take 1 tablet (75 mg) by mouth 2 times daily 60 tablet 0     levonorgestrel (MIRENA) 20 MCG/24HR IUD 1 each by Intrauterine route once       SUMAtriptan (IMITREX) 25 MG tablet Take 1 tablet (25 mg) by mouth at onset of headache for migraine May repeat in 2 hours. Max 8 tablets/24 hours. 30 tablet 0       OBJECTIVE:  There were no vitals taken for this visit.    Physical Exam:  healthy, alert and no distress  PSYCH: Alert and oriented times 3; coherent speech, normal   rate and volume, able to articulate logical thoughts, able   to abstract reason, no tangential thoughts, no hallucinations   or delusions  His affect is normal  RESP: No cough, no audible wheezing, able to talk in full sentences  Remainder of exam unable to be completed due to telephone visits    ---  This note was written with the assistance of the Dragon voice-dictation technology software. The final document, although reviewed, may contain errors. For corrections, please contact the office.    Daquan Powell MD    Sleep Medicine    [facility] [place], [place]  o Main Office: [phone]    [facility],  [facility] [place]  o [address], [place], [address]  o Schedule visits: [phone]  o Main Office: [phone]  o Fax: [phone]    Time spent on the date of service:  35 minutes.

## 2022-12-01 ENCOUNTER — VIRTUAL VISIT (OUTPATIENT)
Dept: PULMONOLOGY | Facility: OTHER | Age: 32
End: 2022-12-01
Attending: FAMILY MEDICINE
Payer: COMMERCIAL

## 2022-12-01 VITALS — WEIGHT: 160 LBS | HEIGHT: 62 IN | BODY MASS INDEX: 29.44 KG/M2

## 2022-12-01 DIAGNOSIS — R06.83 SNORING: ICD-10-CM

## 2022-12-01 PROCEDURE — 99203 OFFICE O/P NEW LOW 30 MIN: CPT | Mod: TEL | Performed by: FAMILY MEDICINE

## 2022-12-01 ASSESSMENT — PAIN SCALES - GENERAL: PAINLEVEL: NO PAIN (0)

## 2022-12-01 NOTE — PROGRESS NOTES
Arabella is a 32 year old who is being evaluated via a billable telephone visit.      What phone number would you like to be contacted at? ***[phone]  How would you like to obtain your AVS? [employer]  Phone call duration: *** minutes      Daria Amaya

## 2022-12-06 ENCOUNTER — OFFICE VISIT (OUTPATIENT)
Dept: OTOLARYNGOLOGY | Facility: OTHER | Age: 32
End: 2022-12-06
Attending: INTERNAL MEDICINE
Payer: COMMERCIAL

## 2022-12-06 ENCOUNTER — OFFICE VISIT (OUTPATIENT)
Dept: AUDIOLOGY | Facility: OTHER | Age: 32
End: 2022-12-06
Attending: NURSE PRACTITIONER
Payer: COMMERCIAL

## 2022-12-06 VITALS
DIASTOLIC BLOOD PRESSURE: 70 MMHG | SYSTOLIC BLOOD PRESSURE: 110 MMHG | BODY MASS INDEX: 29.44 KG/M2 | TEMPERATURE: 98.3 F | OXYGEN SATURATION: 98 % | HEART RATE: 87 BPM | WEIGHT: 160 LBS | HEIGHT: 62 IN

## 2022-12-06 DIAGNOSIS — H81.10 BENIGN PAROXYSMAL POSITIONAL VERTIGO, UNSPECIFIED LATERALITY: ICD-10-CM

## 2022-12-06 DIAGNOSIS — G43.809 VESTIBULAR MIGRAINE: Primary | ICD-10-CM

## 2022-12-06 DIAGNOSIS — R42 DIZZINESS: ICD-10-CM

## 2022-12-06 DIAGNOSIS — H91.93 DECREASED HEARING OF BOTH EARS: ICD-10-CM

## 2022-12-06 DIAGNOSIS — R42 DIZZINESS: Primary | ICD-10-CM

## 2022-12-06 PROCEDURE — 92556 SPEECH AUDIOMETRY COMPLETE: CPT | Performed by: AUDIOLOGIST

## 2022-12-06 PROCEDURE — 92552 PURE TONE AUDIOMETRY AIR: CPT | Performed by: AUDIOLOGIST

## 2022-12-06 PROCEDURE — 92504 EAR MICROSCOPY EXAMINATION: CPT | Performed by: PHYSICIAN ASSISTANT

## 2022-12-06 PROCEDURE — 92550 TYMPANOMETRY & REFLEX THRESH: CPT | Performed by: AUDIOLOGIST

## 2022-12-06 PROCEDURE — 99214 OFFICE O/P EST MOD 30 MIN: CPT | Mod: 25 | Performed by: PHYSICIAN ASSISTANT

## 2022-12-06 RX ORDER — DIAZEPAM 2 MG
2 TABLET ORAL EVERY 6 HOURS PRN
Qty: 30 TABLET | Refills: 1 | Status: SHIPPED | OUTPATIENT
Start: 2022-12-06 | End: 2023-09-29

## 2022-12-06 RX ORDER — TOPIRAMATE 25 MG/1
25 TABLET, FILM COATED ORAL 2 TIMES DAILY
Qty: 60 TABLET | Refills: 1 | Status: SHIPPED | OUTPATIENT
Start: 2022-12-06 | End: 2023-01-23

## 2022-12-06 RX ORDER — ONDANSETRON 4 MG/1
4 TABLET, ORALLY DISINTEGRATING ORAL EVERY 8 HOURS PRN
Qty: 20 TABLET | Refills: 1 | Status: SHIPPED | OUTPATIENT
Start: 2022-12-06

## 2022-12-06 ASSESSMENT — PAIN SCALES - GENERAL: PAINLEVEL: NO PAIN (0)

## 2022-12-06 NOTE — PROGRESS NOTES
Audiology Evaluation Completed. Please refer SCANNED AUDIOGRAM and/or TYMPANOGRAM for BACKGROUND, RESULTS, RECOMMENDATIONS.      [place], [facility]  Audiologist #[id number]

## 2022-12-06 NOTE — PATIENT INSTRUCTIONS
Start Topamax 25 mg one tablet twice a day   Monitor symptoms, may need adjustment of dosing.     Zofran as needed for nausea.  Valium 2 mg as needed   Work on neck exercises.   Maintain good water intake    Follow up in 4-6 weeks.       Thank you for allowing Faviola Carcamo PA-C and our ENT team to participate in your care.  If your medications are too expensive, please give the nurse a call.  We can possibly change this medication.  If you have a scheduling or an appointment question please contact our Health Unit Coordinator at [phone], Ext. [phone].    ALL nursing questions or concerns can be directed to your ENT nurse at: [phone] Yusra

## 2022-12-06 NOTE — LETTER
2022         RE: Arabella Desai  [address]  [place] [address]        Dear Colleague,    Thank you for referring your patient, Arabella Desai, to the [facility] [place]. Please see a copy of my visit note below.    Otolaryngology Consultation    Patient: Arabella Desai  : 1990    Chief Complaint   Patient presents with     Consult     Referred by Dr. Mckeon for BPPV       HPI:  Arabella Desai is a 32 year old female seen today for dizziness/ vertigo/ headaches.   Patient was seen on 2022 by her primary care physician Dr. Olu Hoffmann and expressed concerns regarding dizziness and migraines.  Patient stated that symptoms had developed in September which she was having severe headaches by prolonged periods of dizziness.  She denied rotary vertigo however did feel the sensation of being on a boat.  She was evaluated in emergency room and completed CT, labs.     Arabella states symptoms have been on/ off. She had symptoms for about 1 week, and resolved. She had completed PT with some mild relief.   She had a mild headaches yesterday but did not develop dizziness. She had further migraines and noted immediate floaty sensation/ dizzy for several days to weeks following.      She was given an Imitrex but has not started.    No nausea or emesis.   No fluctuating hearing loss or tinnitus.         Denies COM or otologic surgeries.   Denies otalgia, otorrhea  Denies worrisome tinnitus. Intermittent tinnitus.   Denies fluctuating hearing loss or tinnitus.   Denies vertigo or facial paraesthesia.   Denies dysphagia.   Denies family hx.       Audiogram- 22  Type A tympanograms  Thresholds are normal range  SRT=PTA  WRS-  Right- 100%@55dB  Left- 100% @55 dB      Current Outpatient Rx   Medication Sig Dispense Refill     cyclobenzaprine (FLEXERIL) 10 MG tablet Take 1 tablet (10 mg) by mouth 3 times daily as needed for muscle spasms 90 tablet 0     diclofenac (VOLTAREN) 75  "MG EC tablet Take 1 tablet (75 mg) by mouth 2 times daily 60 tablet 0     levonorgestrel (MIRENA) 20 MCG/24HR IUD 1 each by Intrauterine route once       SUMAtriptan (IMITREX) 25 MG tablet Take 1 tablet (25 mg) by mouth at onset of headache for migraine May repeat in 2 hours. Max 8 tablets/24 hours. 30 tablet 0       Allergies: Seasonal allergies and Latex     Past Medical History:   Diagnosis Date     Chronic interstitial cystitis 2011     Supervision of other normal pregnancy, antepartum 2015    STRETCH Dr. Gordy RODRIGUEZ (spontaneous vaginal delivery) 2016    Jermaine old scar      Tobacco use disorder 2012       Past Surgical History:   Procedure Laterality Date     CYSTOSCOPY         ENT family history reviewed    Social History     Tobacco Use     Smoking status: Every Day     Packs/day: 0.50     Types: Cigarettes     Smokeless tobacco: Never   Substance Use Topics     Alcohol use: Yes     Comment: once a week     Drug use: No       Review of Systems  ROS: 10 point ROS neg other than the symptoms noted above in the HPI     Physical Exam    /70 (BP Location: Left arm, Cuff Size: Adult Regular)   Pulse 87   Temp 98.3  F (36.8  C) (Tympanic)   Ht 1.575 m (5' 2\")   Wt 72.6 kg (160 lb)   SpO2 98%   BMI 29.26 kg/m    General - The patient is well nourished and well developed, and appears to have good nutritional status.  Alert and oriented to person and place, answers questions and cooperates with examination appropriately.   Head and Face - Normocephalic and atraumatic, with no gross asymmetry noted.  The facial nerve is intact, with strong symmetric movements.  Voice and Breathing - The patient was breathing comfortably without the use of accessory muscles. There was no wheezing, stridor, or stertor.  The patients voice was clear and strong, and had appropriate pitch and quality.  Ears - ears exam with otoscope and under otologic microscopy.  The external auditory canals are " patent, the tympanic membranes are intact without effusion, retraction or mass.  Bony landmarks are intact.  Eyes - Extraocular movements intact, and the pupils were reactive to light.  Sclera were not icteric or injected, conjunctiva were pink and moist.  Mouth - Examination of the oral cavity showed pink, healthy oral mucosa. No lesions or ulcerations noted.  The tongue was mobile and midline, and the dentition were in good condition.    Throat - The walls of the oropharynx were smooth, pink, moist, symmetric, and had no lesions or ulcerations.  The tonsillar pillars and soft palate were symmetric.  The uvula was midline on elevation.    Neck - Normal midline excursion of the laryngotracheal complex during swallowing.  Full range of motion on passive movement.  Palpation of the occipital, submental, submandibular, internal jugular chain, and supraclavicular nodes did not demonstrate any abnormal lymph nodes or masses.  Palpation of the thyroid was soft and smooth, with no nodules or goiter appreciated.  The trachea was mobile and midline.  Nose - External contour is symmetric, no gross deflection or scars.  Nasal mucosa is pink and moist with no abnormal mucus.     Neuro: Cranial nerve examination: revealed that for cranial nerve   II: the pupils were reactive and the visual field were full  III, IV, and VI, the extraocular movements were full.    V: facial sensation intact bilateral   VII: facial movements are symmetric  VIII: hearing intact to voice  IX & X: the soft palate rises symmetrically   XI: shoulder movements are symmetric  XII: tongue is midline  Gait is steady and well coordinated   strength is symmetric and strong  Romberg negative  Wandy Hallpike- Symptoms left ear. Repositioned. No producible nystagmus.     Impression and Plan- Arabella Desai is a 32 year old female with:    ICD-10-CM    1. Vestibular migraine  G43.809 topiramate (TOPAMAX) 25 MG tablet     ondansetron (ZOFRAN ODT) 4 MG ODT tab      diazepam (VALIUM) 2 MG tablet      2. Dizziness  R42 Adult ENT  Referral     topiramate (TOPAMAX) 25 MG tablet     ondansetron (ZOFRAN ODT) 4 MG ODT tab     diazepam (VALIUM) 2 MG tablet      3. Benign paroxysmal positional vertigo, unspecified laterality  H81.10 Adult ENT  Referral     diazepam (VALIUM) 2 MG tablet        Start a trial of Topamax BID for 1-2 months.   Valium and Zofran for acute vertigo.   Consider VT if symptoms recur.   If no improvement may consider trial of nortriptyline.    Audiogram was within normal range    Consider MRI of Brain, IAC if there is no improvement    Follow up in 6 weeks.       TOPAMAX   for daily headaches.   side effects discussed: risk of kidney stones 1.5%, weight loss,  respiratory alkalosis from compensation metabolic acidosis, risk of narrow-angle glaucoma causing eye pain or vision loss, slowing down of thinking, decreased sweating and nausea.      Faviola Carcamo PA-C  ENT  [facility], [place]          Again, thank you for allowing me to participate in the care of your patient.        Sincerely,        Faviola Carcamo PA-C

## 2022-12-06 NOTE — PROGRESS NOTES
Otolaryngology Consultation    Patient: Arabella Desai  : 1990    Chief Complaint   Patient presents with     Consult     Referred by Dr. Mckeon for BPPV       HPI:  Arabella Desai is a 32 year old female seen today for dizziness/ vertigo/ headaches.   Patient was seen on 2022 by her primary care physician Dr. Olu Hoffmann and expressed concerns regarding dizziness and migraines.  Patient stated that symptoms had developed in September which she was having severe headaches by prolonged periods of dizziness.  She denied rotary vertigo however did feel the sensation of being on a boat.  She was evaluated in emergency room and completed CT, labs.     Arabella states symptoms have been on/ off. She had symptoms for about 1 week, and resolved. She had completed PT with some mild relief.   She had a mild headaches yesterday but did not develop dizziness. She had further migraines and noted immediate floaty sensation/ dizzy for several days to weeks following.      She was given an Imitrex but has not started.    No nausea or emesis.   No fluctuating hearing loss or tinnitus.         Denies COM or otologic surgeries.   Denies otalgia, otorrhea  Denies worrisome tinnitus. Intermittent tinnitus.   Denies fluctuating hearing loss or tinnitus.   Denies vertigo or facial paraesthesia.   Denies dysphagia.   Denies family hx.       Audiogram- 22  Type A tympanograms  Thresholds are normal range  SRT=PTA  WRS-  Right- 100%@55dB  Left- 100% @55 dB      Current Outpatient Rx   Medication Sig Dispense Refill     cyclobenzaprine (FLEXERIL) 10 MG tablet Take 1 tablet (10 mg) by mouth 3 times daily as needed for muscle spasms 90 tablet 0     diclofenac (VOLTAREN) 75 MG EC tablet Take 1 tablet (75 mg) by mouth 2 times daily 60 tablet 0     levonorgestrel (MIRENA) 20 MCG/24HR IUD 1 each by Intrauterine route once       SUMAtriptan (IMITREX) 25 MG tablet Take 1 tablet (25 mg) by mouth at onset of headache for  "migraine May repeat in 2 hours. Max 8 tablets/24 hours. 30 tablet 0       Allergies: Seasonal allergies and Latex     Past Medical History:   Diagnosis Date     Chronic interstitial cystitis 2011     Supervision of other normal pregnancy, antepartum 2015    STRETCH Dr. Gordy RODRIGUEZ (spontaneous vaginal delivery) 2016    Dean old scar      Tobacco use disorder 2012       Past Surgical History:   Procedure Laterality Date     CYSTOSCOPY         ENT family history reviewed    Social History     Tobacco Use     Smoking status: Every Day     Packs/day: 0.50     Types: Cigarettes     Smokeless tobacco: Never   Substance Use Topics     Alcohol use: Yes     Comment: once a week     Drug use: No       Review of Systems  ROS: 10 point ROS neg other than the symptoms noted above in the HPI     Physical Exam    /70 (BP Location: Left arm, Cuff Size: Adult Regular)   Pulse 87   Temp 98.3  F (36.8  C) (Tympanic)   Ht 1.575 m (5' 2\")   Wt 72.6 kg (160 lb)   SpO2 98%   BMI 29.26 kg/m    General - The patient is well nourished and well developed, and appears to have good nutritional status.  Alert and oriented to person and place, answers questions and cooperates with examination appropriately.   Head and Face - Normocephalic and atraumatic, with no gross asymmetry noted.  The facial nerve is intact, with strong symmetric movements.  Voice and Breathing - The patient was breathing comfortably without the use of accessory muscles. There was no wheezing, stridor, or stertor.  The patients voice was clear and strong, and had appropriate pitch and quality.  Ears - ears exam with otoscope and under otologic microscopy.  The external auditory canals are patent, the tympanic membranes are intact without effusion, retraction or mass.  Bony landmarks are intact.  Eyes - Extraocular movements intact, and the pupils were reactive to light.  Sclera were not icteric or injected, conjunctiva were pink and " moist.  Mouth - Examination of the oral cavity showed pink, healthy oral mucosa. No lesions or ulcerations noted.  The tongue was mobile and midline, and the dentition were in good condition.    Throat - The walls of the oropharynx were smooth, pink, moist, symmetric, and had no lesions or ulcerations.  The tonsillar pillars and soft palate were symmetric.  The uvula was midline on elevation.    Neck - Normal midline excursion of the laryngotracheal complex during swallowing.  Full range of motion on passive movement.  Palpation of the occipital, submental, submandibular, internal jugular chain, and supraclavicular nodes did not demonstrate any abnormal lymph nodes or masses.  Palpation of the thyroid was soft and smooth, with no nodules or goiter appreciated.  The trachea was mobile and midline.  Nose - External contour is symmetric, no gross deflection or scars.  Nasal mucosa is pink and moist with no abnormal mucus.     Neuro: Cranial nerve examination: revealed that for cranial nerve   II: the pupils were reactive and the visual field were full  III, IV, and VI, the extraocular movements were full.    V: facial sensation intact bilateral   VII: facial movements are symmetric  VIII: hearing intact to voice  IX & X: the soft palate rises symmetrically   XI: shoulder movements are symmetric  XII: tongue is midline  Gait is steady and well coordinated   strength is symmetric and strong  Romberg negative  [place] Hallpike- Symptoms left ear. Repositioned. No producible nystagmus.     Impression and Plan- Arabella Desai is a 32 year old female with:    ICD-10-CM    1. Vestibular migraine  G43.809 topiramate (TOPAMAX) 25 MG tablet     ondansetron (ZOFRAN ODT) 4 MG ODT tab     diazepam (VALIUM) 2 MG tablet      2. Dizziness  R42 Adult ENT  Referral     topiramate (TOPAMAX) 25 MG tablet     ondansetron (ZOFRAN ODT) 4 MG ODT tab     diazepam (VALIUM) 2 MG tablet      3. Benign paroxysmal positional vertigo,  unspecified laterality  H81.10 Adult ENT  Referral     diazepam (VALIUM) 2 MG tablet        Start a trial of Topamax BID for 1-2 months.   Valium and Zofran for acute vertigo.   Consider VT if symptoms recur.   If no improvement may consider trial of nortriptyline.    Audiogram was within normal range    Consider MRI of Brain, IAC if there is no improvement    Follow up in 6 weeks.       TOPAMAX   for daily headaches.   side effects discussed: risk of kidney stones 1.5%, weight loss,  respiratory alkalosis from compensation metabolic acidosis, risk of narrow-angle glaucoma causing eye pain or vision loss, slowing down of thinking, decreased sweating and nausea.      Faviola Carcamo PA-C  ENT  [facility], [place]

## 2022-12-13 ENCOUNTER — MYC MEDICAL ADVICE (OUTPATIENT)
Dept: OTOLARYNGOLOGY | Facility: OTHER | Age: 32
End: 2022-12-13

## 2022-12-13 DIAGNOSIS — R42 DIZZINESS: Primary | ICD-10-CM

## 2022-12-13 DIAGNOSIS — S16.1XXD STRAIN OF NECK MUSCLE, SUBSEQUENT ENCOUNTER: ICD-10-CM

## 2022-12-13 DIAGNOSIS — G43.809 VESTIBULAR MIGRAINE: ICD-10-CM

## 2022-12-21 ENCOUNTER — OFFICE VISIT (OUTPATIENT)
Dept: CHIROPRACTIC MEDICINE | Facility: OTHER | Age: 32
End: 2022-12-21
Attending: CHIROPRACTOR
Payer: COMMERCIAL

## 2022-12-21 DIAGNOSIS — M99.02 SEGMENTAL AND SOMATIC DYSFUNCTION OF THORACIC REGION: ICD-10-CM

## 2022-12-21 DIAGNOSIS — M54.2 CERVICALGIA: ICD-10-CM

## 2022-12-21 DIAGNOSIS — M99.01 SEGMENTAL AND SOMATIC DYSFUNCTION OF CERVICAL REGION: Primary | ICD-10-CM

## 2022-12-21 PROCEDURE — 98941 CHIROPRACT MANJ 3-4 REGIONS: CPT | Mod: AT | Performed by: CHIROPRACTOR

## 2022-12-21 NOTE — PROGRESS NOTES
Subjective Finding:    Chief compalint: Patient presents with:  Neck Pain  , Pain Scale: 6/10, Intensity: sharp, Duration: 2 weeks, Radiating:  no.    Date of injury:     Activities that the pain restricts:   Home/household/hobbies/social activities: yes.  Work duties: yes.  Sleep: yes.  Makes symptoms better: rest.  Makes symptoms worse: lumbar flexion.  Have you seen anyone else for the symptoms? No.  Work related: no.  Automobile related injury: no.    Objective and Assessment:    Posture Analysis:   High shoulder: .  Head tilt: .  High iliac crest: .  Head carriage: neutral.  Thoracic Kyphosis: neutral.  Lumbar Lordosis: forward.    Lumbar Range of Motion: extension decreased.  Cervical Range of Motion: .  Thoracic Range of Motion: .  Extremity Range of Motion: .    Palpation:   Quad lumb: bilateral, referred pain: no    Segmental dysfunction pre-treat   Assessment post-treatment:C56  T2  L5  Cervical: .  Thoracic: ROM increased.  Lumbar: ROM increased.    Comments: .      Complicating Factors: .    Procedure(s):  CMT:  66333 Chiropractic manipulative treatment 1-2 regions performed   Thoracic: Diversified, See above for level, Prone and Lumbar: Diversified, See above for level, Side posture    Modalities:  None performed this visit    Therapeutic procedures:  None    Plan:  Treatment plan: PRN.  Instructed patient: stretch as instructed at visit.  Short term goals: reduce pain.  Long term goals: .  Prognosis: excellent.

## 2023-01-05 ENCOUNTER — OFFICE VISIT (OUTPATIENT)
Dept: CHIROPRACTIC MEDICINE | Facility: OTHER | Age: 33
End: 2023-01-05
Attending: CHIROPRACTOR
Payer: COMMERCIAL

## 2023-01-05 DIAGNOSIS — M99.03 SEGMENTAL AND SOMATIC DYSFUNCTION OF LUMBAR REGION: ICD-10-CM

## 2023-01-05 DIAGNOSIS — M99.02 SEGMENTAL AND SOMATIC DYSFUNCTION OF THORACIC REGION: Primary | ICD-10-CM

## 2023-01-05 DIAGNOSIS — M54.50 ACUTE BILATERAL LOW BACK PAIN WITHOUT SCIATICA: ICD-10-CM

## 2023-01-05 DIAGNOSIS — M99.01 SEGMENTAL AND SOMATIC DYSFUNCTION OF CERVICAL REGION: ICD-10-CM

## 2023-01-05 PROCEDURE — 98940 CHIROPRACT MANJ 1-2 REGIONS: CPT | Mod: AT | Performed by: CHIROPRACTOR

## 2023-01-11 NOTE — PROGRESS NOTES
Subjective Finding:    Chief compalint: Patient presents with:  Back Pain  , Pain Scale: 6/10, Intensity: sharp, Duration: 2 weeks, Radiating:  no.    Date of injury:     Activities that the pain restricts:   Home/household/hobbies/social activities: yes.  Work duties: yes.  Sleep: yes.  Makes symptoms better: rest.  Makes symptoms worse: lumbar flexion.  Have you seen anyone else for the symptoms? No.  Work related: no.  Automobile related injury: no.    Objective and Assessment:    Posture Analysis:   High shoulder: .  Head tilt: .  High iliac crest: .  Head carriage: neutral.  Thoracic Kyphosis: neutral.  Lumbar Lordosis: forward.    Lumbar Range of Motion: extension decreased.  Cervical Range of Motion: .  Thoracic Range of Motion: .  Extremity Range of Motion: .    Palpation:   Quad lumb: bilateral, referred pain: no    Segmental dysfunction pre-treat   Assessment post-treatment:C56  T2  L5  Cervical: .  Thoracic: ROM increased.  Lumbar: ROM increased.    Comments: .      Complicating Factors: .    Procedure(s):  CMT:  32444 Chiropractic manipulative treatment 1-2 regions performed   Thoracic: Diversified, See above for level, Prone and Lumbar: Diversified, See above for level, Side posture    Modalities:  None performed this visit    Therapeutic procedures:  None    Plan:  Treatment plan: PRN.  Instructed patient: stretch as instructed at visit.  Short term goals: reduce pain.  Long term goals: .  Prognosis: excellent.

## 2023-01-15 ENCOUNTER — HEALTH MAINTENANCE LETTER (OUTPATIENT)
Age: 33
End: 2023-01-15

## 2023-01-23 ENCOUNTER — OFFICE VISIT (OUTPATIENT)
Dept: OBGYN | Facility: OTHER | Age: 33
End: 2023-01-23
Attending: OBSTETRICS & GYNECOLOGY
Payer: COMMERCIAL

## 2023-01-23 VITALS
SYSTOLIC BLOOD PRESSURE: 124 MMHG | WEIGHT: 157 LBS | DIASTOLIC BLOOD PRESSURE: 70 MMHG | RESPIRATION RATE: 16 BRPM | BODY MASS INDEX: 28.89 KG/M2 | TEMPERATURE: 98 F | HEART RATE: 64 BPM | HEIGHT: 62 IN

## 2023-01-23 DIAGNOSIS — N81.4 PROLAPSE OF UTERUS: Primary | ICD-10-CM

## 2023-01-23 PROBLEM — O99.810 GLUCOSE INTOLERANCE OF PREGNANCY: Status: RESOLVED | Noted: 2017-11-08 | Resolved: 2023-01-23

## 2023-01-23 PROBLEM — G43.809 VESTIBULAR MIGRAINE: Status: ACTIVE | Noted: 2022-12-09

## 2023-01-23 PROBLEM — H81.13 BENIGN PAROXYSMAL POSITIONAL VERTIGO, BILATERAL: Status: ACTIVE | Noted: 2022-12-09

## 2023-01-23 PROBLEM — G43.809 VESTIBULAR MIGRAINE: Status: ACTIVE | Noted: 2023-01-23

## 2023-01-23 PROBLEM — K29.00 OTHER ACUTE GASTRITIS WITHOUT HEMORRHAGE: Status: RESOLVED | Noted: 2019-06-24 | Resolved: 2023-01-23

## 2023-01-23 PROCEDURE — 99213 OFFICE O/P EST LOW 20 MIN: CPT | Performed by: OBSTETRICS & GYNECOLOGY

## 2023-01-23 ASSESSMENT — PAIN SCALES - GENERAL: PAINLEVEL: MILD PAIN (2)

## 2023-01-23 NOTE — PROGRESS NOTES
CHIEF COMPLAINT / REASON FOR VISIT  Patient presents for Gynecology visit for uterine prolapse and IUD check.    HISTORY OF PRESENT ILLNESS  Arabella Desai is a 32 year old  with No LMP recorded. (Menstrual status: IUD). who presents for uterine prolapse and IUD check.  The patient had a Mirena IUD placed on 2020 and is amenorrheic.  The patient had uterine prolapse after the birth of her second and third child and went to physical therapy with some improvement in her symptoms.  The past few weeks the patient has felt some vaginal fullness which she believes is return of her uterine prolapse.  She has more vaginal fullness symptoms when she is standing and on her feet for prolonged periods of time.  The symptoms improve when she is off her feet and resting.  The patient had bothersome constipation last week and reports a lot of straining after the straining she felt a sharp pinch in her cervix and she is concerned that the IUD may have shifted due to her straining.  She denies rectal splinting.  No difficulty urinating, dysuria, hematuria, or flank pain.  She has occasional stress urinary incontinence but denies urge incontinence symptoms.  She is sexually active and denies dyspareunia or postcoital spotting.  She is able to palpate IUD strings.  She smokes tobacco 1/2 pack/day.    MENSTRUAL HISTORY  Menarche was at age 12.  Menses: Amenorrhea with Mirena IUD  She is sexually active and denies issues with intercourse.   Dyspareunia: Denies.  Postcoital spotting: Denies.  Current contraception: IUD.  STD History: No STD history.  Last Pap smear history: Normal.  Mammogram history: N/A.    ALLERGIES     Allergies   Allergen Reactions     Seasonal Allergies      Latex Rash       MEDICATIONS    Current Outpatient Medications:      diazepam (VALIUM) 2 MG tablet, Take 1 tablet (2 mg) by mouth every 6 hours as needed (dizziness), Disp: 30 tablet, Rfl: 1     levonorgestrel (MIRENA) 20 MCG/24HR IUD, 1 each by  "Intrauterine route once, Disp: , Rfl:      ondansetron (ZOFRAN ODT) 4 MG ODT tab, Take 1 tablet (4 mg) by mouth every 8 hours as needed for nausea, Disp: 20 tablet, Rfl: 1     SUMAtriptan (IMITREX) 25 MG tablet, Take 1 tablet (25 mg) by mouth at onset of headache for migraine May repeat in 2 hours. Max 8 tablets/24 hours., Disp: 30 tablet, Rfl: 0    REVIEW OF SYSTEMS  As per HPI, otherwise negative    The patient's Medical Hx, Surgical Hx, Obstetrical Hx, Social Hx, and Family Hx have been reviewed and updated in the electronic medical record.    OBJECTIVE  /70   Pulse 64   Temp 98  F (36.7  C)   Resp 16   Ht 1.575 m (5' 2\")   Wt 71.2 kg (157 lb)   BMI 28.72 kg/m      General:  Well-developed, well-nourished female in no apparent distress.  Neurological: Alert and oriented x3.  Lungs:  Clear to auscultation bilaterally with good inspiratory effort.  No wheezing rhonchi or rales noted. Breathing nonlabored.  Heart:  Regular rate and rhythm without murmur. No JVD.  No peripheral vascular disease.  Abdomen: Soft, nontender, nondistended, positive bowel sounds.  No organomegaly. No rebound, no guarding.  Pelvic exam:  Normal external female genitalia without lesions or abnormalities. Normal pubic hair distribution. Urethral meatus normal in appearance and without masses. Normal Bartholin, Urethral and Willshire's glands. Vaginal mucosa is pink and moist with a small amount of physiologic discharge. No vaginal lesions.  No cystocele or rectocele.  Normal multiparous cervix without lesions or abnormalities. No cervical motion tenderness to palpation. IUD strings are visible. The bladder and urethra are nontender to palpation. Uterus is normal size, shape, consistency, anteflexed, mobile, and nontender. Uterine descent is to the spines.  No adnexal masses or tenderness bilaterally.  Rectal exam:  No external hemorrhoids noted. No rectovaginal nodularity noted. Examination confirms the vaginal exam.  Extremities:  " No clubbing cyanosis or edema. Nontender bilaterally.     Chaperone: Paty Matthews LPN    DIAGNOSTICS  2021 Pap NILM, negative HPV    ASSESSMENT / PLAN  Arabella Desai is a 32 year old  female who presents for uterine prolapse and IUD check.    1 Mild, grade 1 uterine prolapse  2 IUD check  3 Tobacco abuse    - Reassurance is given to the patient.  The IUD appears to be in correct location.  IUD strings are visible.  Palpation of the uterus is nontender and no cervical motion tenderness.  The patient is sexually active and denies dyspareunia or postcoital spotting.  I suspect that the pinching feeling in her cervix was actually secondary to her constipation and straining.  - I discussed prolapse with the patient.  On examination she has some mild grade 1 uterine prolapse down to the spines.  There is no cystocele or rectocele on examination and she has good lateral vaginal support.  This is a very normal finding with a multiparous patient who was had several children.  - I discussed management options for uterine prolapse including physical therapy with pelvic floor physical therapy and strengthening exercises, trial vaginal pessary, observation, and surgical correction.  - At this point the patient desires conservative observation.  - I encouraged patient to avoid constipation and straining.  - Symptomatic relief measures for management of constipation are reviewed with the patient.  - I have like the patient to return to clinic if her symptoms worsen over the next several months.  - Problem list reviewed and updated.  - Follow up in 4-6 months or sooner as needed.    Adam Santillan MD  Obstetrics and Gynecology

## 2023-02-07 DIAGNOSIS — G43.111 INTRACTABLE MIGRAINE WITH AURA WITH STATUS MIGRAINOSUS: ICD-10-CM

## 2023-02-09 NOTE — TELEPHONE ENCOUNTER
SUMAtriptan (IMITREX) 25 MG tablet   Last Written Prescription Date:  11-14-22  Last Fill Quantity: 30,   # refills: 0  Last Office Visit: 11-14-22  Future Office visit:    Next 5 appointments (look out 90 days)    Feb 10, 2023  1:45 PM  (Arrive by 1:30 PM)  Return Visit with Faviola Carcamo PA-C  [facility] ([facility] ) [address] [place] [address]  [phone]           Routing refill request to provider for review/approval because:

## 2023-02-10 ENCOUNTER — OFFICE VISIT (OUTPATIENT)
Dept: OTOLARYNGOLOGY | Facility: OTHER | Age: 33
End: 2023-02-10
Attending: PHYSICIAN ASSISTANT
Payer: COMMERCIAL

## 2023-02-10 VITALS
WEIGHT: 157 LBS | DIASTOLIC BLOOD PRESSURE: 74 MMHG | OXYGEN SATURATION: 98 % | BODY MASS INDEX: 28.89 KG/M2 | HEIGHT: 62 IN | HEART RATE: 94 BPM | SYSTOLIC BLOOD PRESSURE: 126 MMHG | TEMPERATURE: 98.2 F

## 2023-02-10 DIAGNOSIS — G43.809 VESTIBULAR MIGRAINE: ICD-10-CM

## 2023-02-10 DIAGNOSIS — R51.9 RECURRENT HEADACHE: Primary | ICD-10-CM

## 2023-02-10 PROCEDURE — 92504 EAR MICROSCOPY EXAMINATION: CPT | Performed by: PHYSICIAN ASSISTANT

## 2023-02-10 PROCEDURE — 99213 OFFICE O/P EST LOW 20 MIN: CPT | Mod: 25 | Performed by: PHYSICIAN ASSISTANT

## 2023-02-10 RX ORDER — SUMATRIPTAN 25 MG/1
TABLET, FILM COATED ORAL
Qty: 30 TABLET | Refills: 0 | Status: SHIPPED | OUTPATIENT
Start: 2023-02-10

## 2023-02-10 ASSESSMENT — PAIN SCALES - GENERAL: PAINLEVEL: NO PAIN (0)

## 2023-02-10 NOTE — PATIENT INSTRUCTIONS
Continue with current plan.   Follow preventive measures.   Consider further medication options.         Thank you for allowing Faviola Carcamo PA-C and our ENT team to participate in your care.  If your medications are too expensive, please give the nurse a call.  We can possibly change this medication.  If you have a scheduling or an appointment question please contact our Health Unit Coordinator at [phone], Ext. [phone].    ALL nursing questions or concerns can be directed to your ENT nurse at: [phone] Yusra

## 2023-02-10 NOTE — LETTER
2/10/2023         RE: Arabella Desai  [address]  [place] [address]        Dear Colleague,    Thank you for referring your patient, Arabella Desai, to the [facility]. Please see a copy of my visit note below.    Chief Complaint   Patient presents with     Dizziness     Follow up vestibular migraine, dizziness,and BPPV     Patient was last seen on 12/6/2022 for complaints of dizziness at that time she described headaches associated dizziness. Started a trial of Topamax, but she developed side effects and discontinued.       Since she has been doing fairly well since her last visit.     Headaches have been about 2-3 times a week. She feels symptom worsen  Headaches have been fairly persistent   Her dizziness has since resolved.   Foggy, floaty symptoms which have greatly improved.     She did see sleep specialist and there was no concern for apnea/ no desaturations.     No nausea or emesis.   No fluctuating hearing loss or tinnitus.   Denies COM or otologic surgeries.   Denies otalgia, otorrhea  Denies worrisome tinnitus. Intermittent tinnitus.   Denies fluctuating hearing loss or tinnitus.   Denies vertigo or facial paraesthesia.   Denies dysphagia.   Denies family hx.         Audiogram- 12/6/22  Type A tympanograms  Thresholds are normal range  SRT=PTA  WRS-  Right- 100%@55dB  Left- 100% @55 dB       Past Medical History:   Diagnosis Date     Benign paroxysmal positional vertigo, bilateral 12/9/2022     Chronic cough 11/08/2017     Chronic interstitial cystitis 08/12/2011     Chronic seasonal allergic rhinitis due to pollen 08/08/2017     Duodenal ulcer due to Helicobacter pylori 06/01/2020     History of depression 12/30/2015    Grief and postpartum. Patient believes pp depression may have been associated with breast feeding aggression from nurses     Other acute gastritis without hemorrhage 06/24/2019     Prolapse of uterus 1/23/2023     Slow transit constipation 06/24/2019      "Tobacco use disorder 05/17/2012     Vestibular migraine 12/9/2022        Allergies   Allergen Reactions     Seasonal Allergies      Latex Rash     Current Outpatient Medications   Medication     diazepam (VALIUM) 2 MG tablet     levonorgestrel (MIRENA) 20 MCG/24HR IUD     ondansetron (ZOFRAN ODT) 4 MG ODT tab     SUMAtriptan (IMITREX) 25 MG tablet     No current facility-administered medications for this visit.     ROS- SEE HPI  /74 (Cuff Size: Adult Regular)   Pulse 94   Temp 98.2  F (36.8  C) (Tympanic)   Ht 1.575 m (5' 2\")   Wt 71.2 kg (157 lb)   SpO2 98%   BMI 28.72 kg/m      General - The patient is well nourished and well developed, and appears to have good nutritional status.  Alert and oriented to person and place, answers questions and cooperates with examination appropriately.   Head and Face - Normocephalic and atraumatic, with no gross asymmetry noted.  The facial nerve is intact, with strong symmetric movements.  Voice and Breathing - The patient was breathing comfortably without the use of accessory muscles. There was no wheezing, stridor, or stertor.  The patients voice was clear and strong, and had appropriate pitch and quality.  Ears - ears exam with otoscope and under otologic microscopy.  The external auditory canals are patent, the tympanic membranes are intact without effusion, retraction or mass.  Bony landmarks are intact.  Eyes - Extraocular movements intact, and the pupils were reactive to light.  Sclera were not icteric or injected, conjunctiva were pink and moist.  Mouth - Examination of the oral cavity showed pink, healthy oral mucosa. No lesions or ulcerations noted.  The tongue was mobile and midline, and the dentition were in good condition.    Throat - The walls of the oropharynx were smooth, pink, moist, symmetric, and had no lesions or ulcerations.  The tonsillar pillars and soft palate were symmetric.  The uvula was midline on elevation.    Neck - Normal midline " excursion of the laryngotracheal complex during swallowing.  Full range of motion on passive movement.  Palpation of the occipital, submental, submandibular, internal jugular chain, and supraclavicular nodes did not demonstrate any abnormal lymph nodes or masses.  Palpation of the thyroid was soft and smooth, with no nodules or goiter appreciated.  The trachea was mobile and midline.  Nose - External contour is symmetric, no gross deflection or scars.  Nasal mucosa is pink and moist with no abnormal mucus.     Impression and Plan- Arabella Desai is a 32 year old female with:      ICD-10-CM    1. Recurrent headache  R51.9       2. Vestibular migraine  G43.809         Continue with current plan  Discussed lifestyle/ dietary changes  Consider further options if no improvement including Pamelor.       Faviola Carcamo PA-C  ENT  [facility] [place]          Again, thank you for allowing me to participate in the care of your patient.        Sincerely,        Faviola Carcamo PA-C

## 2023-02-10 NOTE — PROGRESS NOTES
Chief Complaint   Patient presents with     Dizziness     Follow up vestibular migraine, dizziness,and BPPV     Patient was last seen on 12/6/2022 for complaints of dizziness at that time she described headaches associated dizziness. Started a trial of Topamax, but she developed side effects and discontinued.       Since she has been doing fairly well since her last visit.     Headaches have been about 2-3 times a week. She feels symptom worsen  Headaches have been fairly persistent   Her dizziness has since resolved.   Foggy, floaty symptoms which have greatly improved.     She did see sleep specialist and there was no concern for apnea/ no desaturations.     No nausea or emesis.   No fluctuating hearing loss or tinnitus.   Denies COM or otologic surgeries.   Denies otalgia, otorrhea  Denies worrisome tinnitus. Intermittent tinnitus.   Denies fluctuating hearing loss or tinnitus.   Denies vertigo or facial paraesthesia.   Denies dysphagia.   Denies family hx.         Audiogram- 12/6/22  Type A tympanograms  Thresholds are normal range  SRT=PTA  WRS-  Right- 100%@55dB  Left- 100% @55 dB       Past Medical History:   Diagnosis Date     Benign paroxysmal positional vertigo, bilateral 12/9/2022     Chronic cough 11/08/2017     Chronic interstitial cystitis 08/12/2011     Chronic seasonal allergic rhinitis due to pollen 08/08/2017     Duodenal ulcer due to Helicobacter pylori 06/01/2020     History of depression 12/30/2015    Grief and postpartum. Patient believes pp depression may have been associated with breast feeding aggression from nurses     Other acute gastritis without hemorrhage 06/24/2019     Prolapse of uterus 1/23/2023     Slow transit constipation 06/24/2019     Tobacco use disorder 05/17/2012     Vestibular migraine 12/9/2022        Allergies   Allergen Reactions     Seasonal Allergies      Latex Rash     Current Outpatient Medications   Medication     diazepam (VALIUM) 2 MG tablet     levonorgestrel  "(MIRENA) 20 MCG/24HR IUD     ondansetron (ZOFRAN ODT) 4 MG ODT tab     SUMAtriptan (IMITREX) 25 MG tablet     No current facility-administered medications for this visit.     ROS- SEE HPI  /74 (Cuff Size: Adult Regular)   Pulse 94   Temp 98.2  F (36.8  C) (Tympanic)   Ht 1.575 m (5' 2\")   Wt 71.2 kg (157 lb)   SpO2 98%   BMI 28.72 kg/m      General - The patient is well nourished and well developed, and appears to have good nutritional status.  Alert and oriented to person and place, answers questions and cooperates with examination appropriately.   Head and Face - Normocephalic and atraumatic, with no gross asymmetry noted.  The facial nerve is intact, with strong symmetric movements.  Voice and Breathing - The patient was breathing comfortably without the use of accessory muscles. There was no wheezing, stridor, or stertor.  The patients voice was clear and strong, and had appropriate pitch and quality.  Ears - ears exam with otoscope and under otologic microscopy.  The external auditory canals are patent, the tympanic membranes are intact without effusion, retraction or mass.  Bony landmarks are intact.  Eyes - Extraocular movements intact, and the pupils were reactive to light.  Sclera were not icteric or injected, conjunctiva were pink and moist.  Mouth - Examination of the oral cavity showed pink, healthy oral mucosa. No lesions or ulcerations noted.  The tongue was mobile and midline, and the dentition were in good condition.    Throat - The walls of the oropharynx were smooth, pink, moist, symmetric, and had no lesions or ulcerations.  The tonsillar pillars and soft palate were symmetric.  The uvula was midline on elevation.    Neck - Normal midline excursion of the laryngotracheal complex during swallowing.  Full range of motion on passive movement.  Palpation of the occipital, submental, submandibular, internal jugular chain, and supraclavicular nodes did not demonstrate any abnormal lymph " nodes or masses.  Palpation of the thyroid was soft and smooth, with no nodules or goiter appreciated.  The trachea was mobile and midline.  Nose - External contour is symmetric, no gross deflection or scars.  Nasal mucosa is pink and moist with no abnormal mucus.     Impression and Plan- Arabella Desai is a 32 year old female with:      ICD-10-CM    1. Recurrent headache  R51.9       2. Vestibular migraine  G43.809         Continue with current plan  Discussed lifestyle/ dietary changes  Consider further options if no improvement including Pamelor.       Faviola Carcamo PA-C  ENT  [facility] [place]

## 2023-02-13 ENCOUNTER — OFFICE VISIT (OUTPATIENT)
Dept: FAMILY MEDICINE | Facility: OTHER | Age: 33
End: 2023-02-13
Attending: FAMILY MEDICINE
Payer: COMMERCIAL

## 2023-02-13 VITALS
HEIGHT: 62 IN | TEMPERATURE: 98.6 F | BODY MASS INDEX: 28.89 KG/M2 | HEART RATE: 100 BPM | DIASTOLIC BLOOD PRESSURE: 70 MMHG | OXYGEN SATURATION: 98 % | WEIGHT: 157 LBS | SYSTOLIC BLOOD PRESSURE: 124 MMHG

## 2023-02-13 DIAGNOSIS — R19.5 ABNORMAL FECES: ICD-10-CM

## 2023-02-13 DIAGNOSIS — R10.9 ABDOMINAL PAIN, UNSPECIFIED ABDOMINAL LOCATION: Primary | ICD-10-CM

## 2023-02-13 DIAGNOSIS — K52.9 FREQUENT STOOLS: ICD-10-CM

## 2023-02-13 LAB
ALBUMIN SERPL BCG-MCNC: 4.7 G/DL (ref 3.5–5.2)
ALP SERPL-CCNC: 73 U/L (ref 35–104)
ALT SERPL W P-5'-P-CCNC: 12 U/L (ref 10–35)
ANION GAP SERPL CALCULATED.3IONS-SCNC: 12 MMOL/L (ref 7–15)
AST SERPL W P-5'-P-CCNC: 14 U/L (ref 10–35)
BASOPHILS # BLD AUTO: 0.1 10E3/UL (ref 0–0.2)
BASOPHILS NFR BLD AUTO: 1 %
BILIRUB SERPL-MCNC: 0.4 MG/DL
BUN SERPL-MCNC: 6.6 MG/DL (ref 6–20)
CALCIUM SERPL-MCNC: 9.9 MG/DL (ref 8.6–10)
CHLORIDE SERPL-SCNC: 103 MMOL/L (ref 98–107)
CREAT SERPL-MCNC: 0.7 MG/DL (ref 0.51–0.95)
CRP SERPL-MCNC: <3 MG/L
DEPRECATED HCO3 PLAS-SCNC: 23 MMOL/L (ref 22–29)
EOSINOPHIL # BLD AUTO: 0.1 10E3/UL (ref 0–0.7)
EOSINOPHIL NFR BLD AUTO: 1 %
ERYTHROCYTE [DISTWIDTH] IN BLOOD BY AUTOMATED COUNT: 12.4 % (ref 10–15)
ERYTHROCYTE [SEDIMENTATION RATE] IN BLOOD BY WESTERGREN METHOD: 5 MM/HR (ref 0–20)
GFR SERPL CREATININE-BSD FRML MDRD: >90 ML/MIN/1.73M2
GLUCOSE SERPL-MCNC: 90 MG/DL (ref 70–99)
HCT VFR BLD AUTO: 45.3 % (ref 35–47)
HGB BLD-MCNC: 15.7 G/DL (ref 11.7–15.7)
IMM GRANULOCYTES # BLD: 0 10E3/UL
IMM GRANULOCYTES NFR BLD: 0 %
LYMPHOCYTES # BLD AUTO: 2.1 10E3/UL (ref 0.8–5.3)
LYMPHOCYTES NFR BLD AUTO: 26 %
MCH RBC QN AUTO: 31 PG (ref 26.5–33)
MCHC RBC AUTO-ENTMCNC: 34.7 G/DL (ref 31.5–36.5)
MCV RBC AUTO: 89 FL (ref 78–100)
MONOCYTES # BLD AUTO: 0.6 10E3/UL (ref 0–1.3)
MONOCYTES NFR BLD AUTO: 7 %
NEUTROPHILS # BLD AUTO: 5.5 10E3/UL (ref 1.6–8.3)
NEUTROPHILS NFR BLD AUTO: 65 %
NRBC # BLD AUTO: 0 10E3/UL
NRBC BLD AUTO-RTO: 0 /100
PLATELET # BLD AUTO: 256 10E3/UL (ref 150–450)
POTASSIUM SERPL-SCNC: 3.7 MMOL/L (ref 3.4–5.3)
PROT SERPL-MCNC: 7.5 G/DL (ref 6.4–8.3)
RBC # BLD AUTO: 5.07 10E6/UL (ref 3.8–5.2)
SODIUM SERPL-SCNC: 138 MMOL/L (ref 136–145)
TSH SERPL DL<=0.005 MIU/L-ACNC: 2.11 UIU/ML (ref 0.3–4.2)
WBC # BLD AUTO: 8.4 10E3/UL (ref 4–11)

## 2023-02-13 PROCEDURE — 99213 OFFICE O/P EST LOW 20 MIN: CPT | Performed by: FAMILY MEDICINE

## 2023-02-13 PROCEDURE — 85652 RBC SED RATE AUTOMATED: CPT | Performed by: FAMILY MEDICINE

## 2023-02-13 PROCEDURE — 86364 TISS TRNSGLTMNASE EA IG CLAS: CPT | Performed by: FAMILY MEDICINE

## 2023-02-13 PROCEDURE — 86231 EMA EACH IG CLASS: CPT | Mod: 90 | Performed by: FAMILY MEDICINE

## 2023-02-13 PROCEDURE — 36415 COLL VENOUS BLD VENIPUNCTURE: CPT | Performed by: FAMILY MEDICINE

## 2023-02-13 PROCEDURE — 86140 C-REACTIVE PROTEIN: CPT | Performed by: FAMILY MEDICINE

## 2023-02-13 PROCEDURE — 80050 GENERAL HEALTH PANEL: CPT | Performed by: FAMILY MEDICINE

## 2023-02-13 NOTE — PROGRESS NOTES
"  Assessment & Plan     Abdominal pain, unspecified abdominal location  Pain is intermittent, with irregular bowel pattern - patient states she is constipated, but really is having frequent stools, but not evacuating.  No small pebble like bowels.  At times looser.   Unclear to what degree her pelvic floor dysfunction or prolapse is contributing.  Listed as mild, grade 1 per GYN.  Is considering hysterectomy.  Reassess with labs - signs of malabsorption, anemia, gluten intolerance, inflammatory markers, etc.  Did consider imaging - prior US negative for gallstones, though could have formed since.  Referral to GI.   sees Dr Angelo for his Crohn's. Would like to keep her health/providers separate.  Referral placed to Dr Paz.  - CRP, inflammation; Future  - ESR: Erythrocyte sedimentation rate; Future  - TSH with free T4 reflex; Future  - CBC with platelets and differential; Future  - Comprehensive metabolic panel (BMP + Alb, Alk Phos, ALT, AST, Total. Bili, TP); Future  - Tissue transglutaminase crys IgA and IgG; Future  - Endomysial Antibody IgA by IFA; Future  - Adult GI  Referral - Consult Only; Future    Abnormal feces  As above.  - CRP, inflammation; Future  - ESR: Erythrocyte sedimentation rate; Future  - TSH with free T4 reflex; Future  - CBC with platelets and differential; Future  - Comprehensive metabolic panel (BMP + Alb, Alk Phos, ALT, AST, Total. Bili, TP); Future  - Tissue transglutaminase crys IgA and IgG; Future  - Endomysial Antibody IgA by IFA; Future  - Adult GI  Referral - Consult Only; Future    Frequent stools  As above.        28 minutes spent on the date of the encounter doing chart review, history and exam, documentation and further activities per the note       BMI:   Estimated body mass index is 28.72 kg/m  as calculated from the following:    Height as of this encounter: 1.575 m (5' 2\").    Weight as of this encounter: 71.2 kg (157 lb).   Weight management plan: " Discussed healthy diet and exercise guidelines    Patient Instructions   Will call with lab results.  Referral to Dr Paz - GI.  Try gradually slowly increasing Metamucil - 3, 2, 2/3, full scoop over time.        Return if symptoms worsen or fail to improve.    Anayeli San MD  [facility] Bell is a 33 year old, presenting for the following health issues:  Constipation      HPI     -Decline vaccines    Constipation  Onset/Duration: ongoing since 2018; prolasped uterus and was told about a few weeks ago.   Description:  Frequency of bowel movements: on a daily basis  Consistency of stool: it depends, sometimes loose and sometimes firm  Progression of Symptoms: constant  Accompanying signs and symptoms:    Abdominal pain: YES- upper and lower quadrants   Rectal pain: YES- pressure   Blood in stool: No   Nausea/Vomiting: YES- threw up all weekend - goes with constipation often; 23   Weight loss or gain: YES- both  History:   Similar problems in past: YES- it has been ongoing since 2018  History of abdominal surgery: No  Chronic laxative use: no, but fiber use- metamucil   New medications: YES- valium, does not take it often she uses it as needed  Precipitating or alleviating factors: none  Therapies tried and outcome: None    Had frequency - multiple times throughout the day.  5 already today.  Even with minimal intake.  Small amounts.  Not hard pellets.  Sometimes loose.  Has to strain, push manually on lower abdomen. Small amounts.  Nausea with eating.  Vomiting with trip to the cities.  Does get chills, but no fever.     No urinary symptoms.    GYN visit 23 - Dr Santillan - mild, grade 1 uterine prolapse.  IUD in place.  Did prior PT.  Considering hysterectomy.  .  Done having children.    Family history diverticulitis - mom - had surgery twice.  Patient had acute diverticulitis 2018 - CT scan - ascending colon.     Crohn's.      No trigger  "identified in diet.    Review of Systems   Constitutional, HEENT, cardiovascular, pulmonary, gi and gu systems are negative, except as otherwise noted.      Objective    /70 (BP Location: Right arm, Patient Position: Sitting, Cuff Size: Adult Regular)   Pulse 100   Temp 98.6  F (37  C) (Tympanic)   Ht 1.575 m (5' 2\")   Wt 71.2 kg (157 lb)   SpO2 98%   BMI 28.72 kg/m    Body mass index is 28.72 kg/m .  Physical Exam   GENERAL: healthy, alert and no distress  RESP: lungs clear to auscultation - no rales, rhonchi or wheezes  CV: regular rate and rhythm, normal S1 S2, no S3 or S4, no murmur, click or rub, no peripheral edema and peripheral pulses strong  ABDOMEN: soft, nontender, no hepatosplenomegaly, no masses and bowel sounds normal  MS: no gross musculoskeletal defects noted, no edema  PSYCH: mentation appears normal and tearful at times    Labs pending                "

## 2023-02-13 NOTE — PATIENT INSTRUCTIONS
Will call with lab results.  Referral to Dr Brandi AKNIS.  Try gradually slowly increasing Metamucil - 1/3, 1/2, 2/3, full scoop over time.

## 2023-02-15 LAB
TTG IGA SER-ACNC: 0.5 U/ML
TTG IGG SER-ACNC: <0.6 U/ML

## 2023-02-16 LAB — ENDOMYSIUM IGA TITR SER IF: NORMAL {TITER}

## 2023-02-20 ENCOUNTER — MYC MEDICAL ADVICE (OUTPATIENT)
Dept: FAMILY MEDICINE | Facility: OTHER | Age: 33
End: 2023-02-20

## 2023-02-20 DIAGNOSIS — R10.9 ABDOMINAL PAIN, UNSPECIFIED ABDOMINAL LOCATION: Primary | ICD-10-CM

## 2023-02-27 ENCOUNTER — HOSPITAL ENCOUNTER (OUTPATIENT)
Dept: CT IMAGING | Facility: HOSPITAL | Age: 33
Discharge: HOME | End: 2023-02-27
Attending: FAMILY MEDICINE | Admitting: FAMILY MEDICINE
Payer: COMMERCIAL

## 2023-02-27 DIAGNOSIS — R10.9 ABDOMINAL PAIN, UNSPECIFIED ABDOMINAL LOCATION: ICD-10-CM

## 2023-02-27 PROCEDURE — 250N000011 HC RX IP 250 OP 636: Performed by: RADIOLOGY

## 2023-02-27 PROCEDURE — 74177 CT ABD & PELVIS W/CONTRAST: CPT

## 2023-02-27 RX ORDER — IOPAMIDOL 755 MG/ML
17 INJECTION, SOLUTION INTRAVASCULAR ONCE
Status: COMPLETED | OUTPATIENT
Start: 2023-02-27 | End: 2023-02-27

## 2023-02-27 RX ORDER — IOPAMIDOL 755 MG/ML
75 INJECTION, SOLUTION INTRAVASCULAR ONCE
Status: COMPLETED | OUTPATIENT
Start: 2023-02-27 | End: 2023-02-27

## 2023-02-27 RX ADMIN — IOPAMIDOL 75 ML: 755 INJECTION, SOLUTION INTRAVENOUS at 13:30

## 2023-02-27 RX ADMIN — IOPAMIDOL 17 ML: 755 INJECTION, SOLUTION INTRAVENOUS at 13:30

## 2023-05-10 ENCOUNTER — OFFICE VISIT (OUTPATIENT)
Dept: CHIROPRACTIC MEDICINE | Facility: OTHER | Age: 33
End: 2023-05-10
Attending: CHIROPRACTOR
Payer: COMMERCIAL

## 2023-05-10 DIAGNOSIS — M54.2 CERVICALGIA: ICD-10-CM

## 2023-05-10 DIAGNOSIS — M99.03 SEGMENTAL AND SOMATIC DYSFUNCTION OF LUMBAR REGION: ICD-10-CM

## 2023-05-10 DIAGNOSIS — M99.02 SEGMENTAL AND SOMATIC DYSFUNCTION OF THORACIC REGION: ICD-10-CM

## 2023-05-10 DIAGNOSIS — M99.01 SEGMENTAL AND SOMATIC DYSFUNCTION OF CERVICAL REGION: Primary | ICD-10-CM

## 2023-05-10 PROCEDURE — 98941 CHIROPRACT MANJ 3-4 REGIONS: CPT | Mod: AT | Performed by: CHIROPRACTOR

## 2023-05-25 ENCOUNTER — APPOINTMENT (OUTPATIENT)
Dept: OCCUPATIONAL MEDICINE | Facility: OTHER | Age: 33
End: 2023-05-25

## 2023-05-25 PROCEDURE — 99499 UNLISTED E&M SERVICE: CPT

## 2023-05-25 PROCEDURE — 99000 SPECIMEN HANDLING OFFICE-LAB: CPT

## 2023-09-27 DIAGNOSIS — R42 DIZZINESS: ICD-10-CM

## 2023-09-27 DIAGNOSIS — G43.809 VESTIBULAR MIGRAINE: ICD-10-CM

## 2023-09-27 DIAGNOSIS — H81.10 BENIGN PAROXYSMAL POSITIONAL VERTIGO, UNSPECIFIED LATERALITY: ICD-10-CM

## 2023-09-29 RX ORDER — DIAZEPAM 2 MG
TABLET ORAL
Qty: 30 TABLET | Refills: 0 | Status: SHIPPED | OUTPATIENT
Start: 2023-09-29

## 2023-09-29 NOTE — TELEPHONE ENCOUNTER
Valium      Last Written Prescription Date:  2.7.23  Last Fill Quantity: #30,   # refills: 0  Last Office Visit: 2.10.23  Future Office visit:       Routing refill request to provider for review/approval because:  Drug not on the [facility], [facility] or [facility] refill protocol or controlled substance

## 2024-01-08 ENCOUNTER — OFFICE VISIT (OUTPATIENT)
Dept: CHIROPRACTIC MEDICINE | Facility: OTHER | Age: 34
End: 2024-01-08
Attending: CHIROPRACTOR
Payer: COMMERCIAL

## 2024-01-08 DIAGNOSIS — M99.03 SEGMENTAL AND SOMATIC DYSFUNCTION OF LUMBAR REGION: Primary | ICD-10-CM

## 2024-01-08 DIAGNOSIS — M99.01 SEGMENTAL AND SOMATIC DYSFUNCTION OF CERVICAL REGION: ICD-10-CM

## 2024-01-08 DIAGNOSIS — M99.02 SEGMENTAL AND SOMATIC DYSFUNCTION OF THORACIC REGION: ICD-10-CM

## 2024-01-08 DIAGNOSIS — M54.50 ACUTE BILATERAL LOW BACK PAIN WITHOUT SCIATICA: ICD-10-CM

## 2024-01-08 PROCEDURE — 98941 CHIROPRACT MANJ 3-4 REGIONS: CPT | Mod: AT | Performed by: CHIROPRACTOR

## 2024-01-09 NOTE — PROGRESS NOTES
Subjective Finding:    Chief compalint: Patient presents with:  Back Pain: Neck and lower back pain  , Pain Scale: 4/10, Intensity: sharp, Duration: 1 weeks, Radiating:  no.    Date of injury:     Activities that the pain restricts:   Home/household/hobbies/social activities: yes.  Work duties: yes.  Sleep: yes.  Makes symptoms better: rest.  Makes symptoms worse: lumbar flexion.  Have you seen anyone else for the symptoms? No.  Work related: no.  Automobile related injury: no.    Objective and Assessment:    Posture Analysis:   High shoulder: .  Head tilt: .  High iliac crest: .  Head carriage: neutral.  Thoracic Kyphosis: neutral.  Lumbar Lordosis: forward.    Lumbar Range of Motion: extension decreased.  Cervical Range of Motion: .  Thoracic Range of Motion: .  Extremity Range of Motion: .    Palpation:   Quad lumb: bilateral, referred pain: no    Segmental dysfunction pre-treat   Assessment post-treatment:C56  T2  L5  Cervical: .  Thoracic: ROM increased.  Lumbar: ROM increased.    Comments: .      Complicating Factors: .    Procedure(s):  CMT:  79404 Chiropractic manipulative treatment 3 regions performed   Thoracic: Diversified, See above for level, Prone and Lumbar: Diversified, See above for level, Side posture  Cervical        Modalities:  None performed this visit    Therapeutic procedures:  None    Plan:  Treatment plan: PRN.  Instructed patient: stretch as instructed at visit.  Short term goals: reduce pain.  Long term goals: .  Prognosis: excellent.

## 2024-01-11 ENCOUNTER — OFFICE VISIT (OUTPATIENT)
Dept: CHIROPRACTIC MEDICINE | Facility: OTHER | Age: 34
End: 2024-01-11
Attending: CHIROPRACTOR
Payer: COMMERCIAL

## 2024-01-11 DIAGNOSIS — M99.02 SEGMENTAL AND SOMATIC DYSFUNCTION OF THORACIC REGION: ICD-10-CM

## 2024-01-11 DIAGNOSIS — M54.50 ACUTE BILATERAL LOW BACK PAIN WITHOUT SCIATICA: ICD-10-CM

## 2024-01-11 DIAGNOSIS — M99.03 SEGMENTAL AND SOMATIC DYSFUNCTION OF LUMBAR REGION: Primary | ICD-10-CM

## 2024-01-11 DIAGNOSIS — M99.01 SEGMENTAL AND SOMATIC DYSFUNCTION OF CERVICAL REGION: ICD-10-CM

## 2024-01-11 PROCEDURE — 98941 CHIROPRACT MANJ 3-4 REGIONS: CPT | Mod: AT | Performed by: CHIROPRACTOR

## 2024-01-11 NOTE — PROGRESS NOTES
Subjective Finding:    Chief compalint: Patient presents with:  Back Pain: Tightness in lower back  , Pain Scale: 4/10, Intensity: sharp, Duration: 1 weeks, Radiating:  no.    Date of injury:     Activities that the pain restricts:   Home/household/hobbies/social activities: yes.  Work duties: yes.  Sleep: yes.  Makes symptoms better: rest.  Makes symptoms worse: lumbar flexion.  Have you seen anyone else for the symptoms? No.  Work related: no.  Automobile related injury: no.    Objective and Assessment:    Posture Analysis:   High shoulder: .  Head tilt: .  High iliac crest: .  Head carriage: neutral.  Thoracic Kyphosis: neutral.  Lumbar Lordosis: forward.    Lumbar Range of Motion: extension decreased.  Cervical Range of Motion: .  Thoracic Range of Motion: .  Extremity Range of Motion: .    Palpation:   Quad lumb: bilateral, referred pain: no    Segmental dysfunction pre-treat   Assessment post-treatment:C56  T2  L5  Cervical: .  Thoracic: ROM increased.  Lumbar: ROM increased.    Comments: .      Complicating Factors: .    Procedure(s):  CMT:  73885 Chiropractic manipulative treatment 3 regions performed   Thoracic: Diversified, See above for level, Prone and Lumbar: Diversified, See above for level, Side posture  Cervical        Modalities:  None performed this visit    Therapeutic procedures:  None    Plan:  Treatment plan: PRN.  Instructed patient: stretch as instructed at visit.  Short term goals: reduce pain.  Long term goals: .  Prognosis: excellent.

## 2024-02-17 ENCOUNTER — HEALTH MAINTENANCE LETTER (OUTPATIENT)
Age: 34
End: 2024-02-17

## 2024-03-22 NOTE — PATIENT INSTRUCTIONS
"It was a pleasure to speak with you today.    For reducing snoring, we had discussed options including the self fit mouth guard devices, positional devices that prevent back sleeping, allergy medications to reduce snoring.    For the mouthguard devices, I will include a few, and options below.  These are ordered online and should not require specific prescription.    1) Pure Sleep  -My most commonly used option, seems to be a good balance of cost to comfort  Http://www.puresleep.com    2) Zyppah  -Another popular option, slightly more expensive  Http://www.zyppah.com    The back pillows are called \"positional therapy\".  There are three options that I would recommend reviewing and considering.     1.)  Sleep Noodle  - Least expensive, decent quality, foam core for the pillow  https://www.cpapology.com/sleep-noodle     2.)  Slumber Bump  - Medium for cost, inflatable air cushion for the pillow  https://www.slumberbump.com/     3.)  Zzoma  - Highest cost, very nice quality, some clinical data documenting efficacy, foam core for the pillow  https://www.zzomaosa.com/    With medication options, we discussed:    1)  Nasal fluticasone (Flonase)  - One to two sprays in each nostril at bed time    2)  Montelukast (Singulair)  -Pill medication, taken once daily    Daquan Powell MD    Sleep Medicine  [place], [place]  Main Office: [phone]  [facility] [address], [address]  Schedule visits: [phone]  Main Office: [phone]  Fax: [phone]    " <<--- Click to launch

## 2024-08-06 ENCOUNTER — HOSPITAL ENCOUNTER (EMERGENCY)
Facility: HOSPITAL | Age: 34
Discharge: HOME | End: 2024-08-06
Payer: COMMERCIAL

## 2024-08-06 VITALS
OXYGEN SATURATION: 98 % | DIASTOLIC BLOOD PRESSURE: 78 MMHG | HEART RATE: 78 BPM | TEMPERATURE: 98.7 F | RESPIRATION RATE: 19 BRPM | SYSTOLIC BLOOD PRESSURE: 128 MMHG

## 2024-08-06 DIAGNOSIS — H65.91 MIDDLE EAR EFFUSION, RIGHT: ICD-10-CM

## 2024-08-06 PROCEDURE — G0463 HOSPITAL OUTPT CLINIC VISIT: HCPCS

## 2024-08-06 PROCEDURE — 99213 OFFICE O/P EST LOW 20 MIN: CPT

## 2024-08-06 RX ORDER — MONTELUKAST SODIUM 4 MG/1
TABLET, CHEWABLE ORAL
COMMUNITY
Start: 2023-08-23

## 2024-08-06 RX ORDER — PANTOPRAZOLE SODIUM 40 MG/1
TABLET, DELAYED RELEASE ORAL
COMMUNITY
Start: 2023-08-21

## 2024-08-06 ASSESSMENT — COLUMBIA-SUICIDE SEVERITY RATING SCALE - C-SSRS
6. HAVE YOU EVER DONE ANYTHING, STARTED TO DO ANYTHING, OR PREPARED TO DO ANYTHING TO END YOUR LIFE?: NO
2. HAVE YOU ACTUALLY HAD ANY THOUGHTS OF KILLING YOURSELF IN THE PAST MONTH?: NO
1. IN THE PAST MONTH, HAVE YOU WISHED YOU WERE DEAD OR WISHED YOU COULD GO TO SLEEP AND NOT WAKE UP?: NO

## 2024-08-06 ASSESSMENT — ENCOUNTER SYMPTOMS
COUGH: 0
DIZZINESS: 0
WEAKNESS: 0
TROUBLE SWALLOWING: 0
ACTIVITY CHANGE: 0
FEVER: 0
SINUS PAIN: 1
APPETITE CHANGE: 0
SORE THROAT: 0
NUMBNESS: 0

## 2024-08-06 NOTE — ED TRIAGE NOTES
C/o ear pain     Right sided ear pain with out drainage  Sx started 4 days ago    no otc meds today

## 2024-08-06 NOTE — DISCHARGE INSTRUCTIONS
Flonase nasal spray once daily for 5 days.   You can take sudafed 60mg every 4-6 hours as needed. This will help dry the fluid behind your ear drum.   Tylenol and ibuprofen as needed.     Return with any new or concerning symptoms. Follow up in the clinic for a recheck.

## 2025-01-27 ENCOUNTER — OFFICE VISIT (OUTPATIENT)
Dept: FAMILY MEDICINE | Facility: OTHER | Age: 35
End: 2025-01-27
Attending: STUDENT IN AN ORGANIZED HEALTH CARE EDUCATION/TRAINING PROGRAM
Payer: COMMERCIAL

## 2025-01-27 VITALS
HEIGHT: 62 IN | BODY MASS INDEX: 33.2 KG/M2 | WEIGHT: 180.4 LBS | SYSTOLIC BLOOD PRESSURE: 124 MMHG | DIASTOLIC BLOOD PRESSURE: 70 MMHG | HEART RATE: 92 BPM | RESPIRATION RATE: 16 BRPM | TEMPERATURE: 97.6 F | OXYGEN SATURATION: 98 %

## 2025-01-27 DIAGNOSIS — J06.9 VIRAL URI: ICD-10-CM

## 2025-01-27 DIAGNOSIS — R05.1 ACUTE COUGH: Primary | ICD-10-CM

## 2025-01-27 PROBLEM — G43.809 VESTIBULAR MIGRAINE: Chronic | Status: ACTIVE | Noted: 2022-12-09

## 2025-01-27 PROBLEM — K59.01 SLOW TRANSIT CONSTIPATION: Chronic | Status: ACTIVE | Noted: 2019-06-24

## 2025-01-27 LAB
FLUAV RNA SPEC QL NAA+PROBE: NEGATIVE
FLUBV RNA RESP QL NAA+PROBE: NEGATIVE
RSV RNA SPEC NAA+PROBE: NEGATIVE
SARS-COV-2 RNA RESP QL NAA+PROBE: NEGATIVE

## 2025-01-27 PROCEDURE — 99213 OFFICE O/P EST LOW 20 MIN: CPT | Performed by: STUDENT IN AN ORGANIZED HEALTH CARE EDUCATION/TRAINING PROGRAM

## 2025-01-27 PROCEDURE — 87637 SARSCOV2&INF A&B&RSV AMP PRB: CPT | Performed by: STUDENT IN AN ORGANIZED HEALTH CARE EDUCATION/TRAINING PROGRAM

## 2025-01-27 RX ORDER — BENZONATATE 200 MG/1
200 CAPSULE ORAL 3 TIMES DAILY PRN
Qty: 21 CAPSULE | Refills: 0 | Status: SHIPPED | OUTPATIENT
Start: 2025-01-27

## 2025-01-27 RX ORDER — CODEINE PHOSPHATE AND GUAIFENESIN 10; 100 MG/5ML; MG/5ML
1-2 SOLUTION ORAL EVERY 4 HOURS PRN
Qty: 118 ML | Refills: 0 | Status: SHIPPED | OUTPATIENT
Start: 2025-01-27

## 2025-01-27 RX ORDER — GUAIFENESIN 600 MG/1
600-1200 TABLET, EXTENDED RELEASE ORAL 2 TIMES DAILY PRN
Qty: 20 TABLET | Refills: 0 | Status: SHIPPED | OUTPATIENT
Start: 2025-01-27

## 2025-01-27 ASSESSMENT — ENCOUNTER SYMPTOMS: COUGH: 1

## 2025-01-27 ASSESSMENT — PAIN SCALES - GENERAL: PAINLEVEL_OUTOF10: SEVERE PAIN (7)

## 2025-01-27 NOTE — PROGRESS NOTES
Assessment & Plan     Acute cough / Viral URI  Cough and respiratory symptoms ongoing over the last week with sick contacts and recent ill son who has improved.  Vitals reassuring.  Well-appearing on examination.  Lungs clear.  Suspect this is a viral syndrome with one of the many circulating viruses at this time.  Should start to see some improvement in her cough over this week.  Discussed supportive care with hydration, tea with honey, sinus treatments, Mucinex.  Will also try Tessalon.  Did give Robitussin with codeine for nighttime use to help her sleep better.  If she has improved sleep, likely will recover faster.  If not improving by the end of the week, could consider antibiotic to cover for atypicals, however lower likelihood at this point, unlikely be pertussis, and trying to avoid inducing any antibiotic resistance.  She will reach out if not improving.  If symptoms are worsening, will need to follow-up. Concerning signs and symptoms reviewed that would indicate need for urgent re-evaluation. Patient stated understanding and agreement with the plan.   - guaiFENesin (MUCINEX) 600 MG 12 hr tablet; Take 1-2 tablets (600-1,200 mg) by mouth 2 times daily as needed for congestion.  - guaiFENesin-codeine (ROBITUSSIN AC) 100-10 MG/5ML solution; Take 5-10 mLs by mouth every 4 hours as needed for cough.  - benzonatate (TESSALON) 200 MG capsule; Take 1 capsule (200 mg) by mouth 3 times daily as needed for cough.  - Influenza A/B, RSV and SARS-CoV2 PCR (COVID-19); Future  - Influenza A/B, RSV and SARS-CoV2 PCR (COVID-19) Lopez Bell is a 34 year old, presenting for the following health issues:  Cough        1/27/2025    10:45 AM   Additional Questions   Roomed by Criss maurice   Accompanied by self         1/27/2025    10:45 AM   Patient Reported Additional Medications   Patient reports taking the following new medications none     History of Present Illness       Reason for visit:   "Cold  Symptom onset:  3-7 days ago  Symptoms include:  Cough  Symptom intensity:  Severe  Symptom progression:  Staying the same  Had these symptoms before:  Yes  Has tried/received treatment for these symptoms:  Yes  Previous treatment was successful:  No  What makes it worse:  No  What makes it better:  No   She is taking medications regularly.       Acute Illness  Acute illness concerns: cough  Onset/Duration: Around 1 week - seemed to start last Sunday   Symptoms:  Fever: YES- 99 temp (tympanic); yesterday was 99F; no tylenol or ibuprofen this am   Chills/Sweats: YES sweats  Headache (location?): YES all over  Sinus Pressure: YES; not stuffy nose but dripping   Conjunctivitis:  YES redness  Ear Pain: no  Rhinorrhea: YES  Congestion: YES  Sore Throat: YES  Cough: YES - all day and night. Nothing coming up. Not seeming to get better. No shortness of breath.   Wheeze: No  Decreased Appetite: YES  Nausea: No  Vomiting: No  Diarrhea: YES - a little bit - likely from medications for cold   Dysuria/Freq.: No  Dysuria or Hematuria: No  Fatigue/Achiness: YES  Sick/Strep Exposure: YES has kids at home - son was sick last week with cough and congestion   Therapies tried and outcome: Mucinex DM, Nyquil. None have helped much      Pretty sure she had covid over laura.    started coughing last night   +cigarette use         Objective    /70 (BP Location: Right arm, Patient Position: Sitting, Cuff Size: Adult Large)   Pulse 92   Temp 97.6  F (36.4  C) (Tympanic)   Resp 16   Ht 1.575 m (5' 2\")   Wt 81.8 kg (180 lb 6.4 oz)   SpO2 98%   BMI 33.00 kg/m    Body mass index is 33 kg/m .  Physical Exam  Constitutional:       General: She is not in acute distress.     Appearance: Normal appearance. She is not ill-appearing or diaphoretic.   HENT:      Right Ear: Tympanic membrane and external ear normal.      Left Ear: Tympanic membrane and external ear normal.      Nose: Congestion present. No mucosal edema or " rhinorrhea.      Right Sinus: No maxillary sinus tenderness or frontal sinus tenderness.      Left Sinus: No maxillary sinus tenderness or frontal sinus tenderness.      Mouth/Throat:      Mouth: Mucous membranes are moist.      Pharynx: Oropharynx is clear. No oropharyngeal exudate or posterior oropharyngeal erythema.      Tonsils: No tonsillar exudate or tonsillar abscesses.      Comments: Signs of chronic postnasal drainage down posterior pharynx  Eyes:      General: No scleral icterus.        Right eye: No discharge.         Left eye: No discharge.      Extraocular Movements: Extraocular movements intact.      Conjunctiva/sclera: Conjunctivae normal.      Pupils: Pupils are equal, round, and reactive to light.   Cardiovascular:      Rate and Rhythm: Normal rate and regular rhythm.      Heart sounds: No murmur heard.  Pulmonary:      Effort: Pulmonary effort is normal.      Breath sounds: Normal breath sounds. No wheezing, rhonchi or rales.   Abdominal:      Palpations: Abdomen is soft. There is no mass.      Tenderness: There is no abdominal tenderness. There is no guarding.   Musculoskeletal:      Cervical back: Normal range of motion and neck supple. No tenderness.   Lymphadenopathy:      Cervical: No cervical adenopathy.   Skin:     General: Skin is warm and dry.      Capillary Refill: Capillary refill takes less than 2 seconds.   Neurological:      General: No focal deficit present.      Mental Status: She is alert and oriented to person, place, and time.   Psychiatric:         Mood and Affect: Mood normal.         Behavior: Behavior normal.            Results for orders placed or performed in visit on 01/27/25   Influenza A/B, RSV and SARS-CoV2 PCR (COVID-19) Nose     Status: Normal    Specimen: Nose; Swab   Result Value Ref Range    Influenza A PCR Negative Negative    Influenza B PCR Negative Negative    RSV PCR Negative Negative    SARS CoV2 PCR Negative Negative    Narrative    Testing was performed  using the Xpert Xpress CoV2/Flu/RSV Assay on the [employer] GeneXpert Instrument. This test should be ordered for the detection of SARS-CoV2, influenza, and RSV viruses in individuals with signs and symptoms of respiratory tract infection. This test is for in vitro diagnostic use under the [place] FDA for laboratories certified under CLIA to perform high or moderate complexity testing. This test has been [place] FDA cleared. A negative result does not rule out the presence of PCR inhibitors in the specimen or target RNA in concentration below the limit of detection for the assay. If only one viral target is positive but coinfection with multiple targets is suspected, the sample should be re-tested with another FDA cleared, approved, or authorized test, if coninfection would change clinical management. This test was validated by the [facility]. These laboratories are certified under the Clinical Laboratory Improvement Amendments of 1988 (CLIA-88) as qualified to perfom high complexity laboratory testing.             Signed Electronically by: Yasmeen Ann MD

## 2025-01-30 ENCOUNTER — OFFICE VISIT (OUTPATIENT)
Dept: OBGYN | Facility: OTHER | Age: 35
End: 2025-01-30
Attending: OBSTETRICS & GYNECOLOGY
Payer: COMMERCIAL

## 2025-01-30 VITALS
HEIGHT: 62 IN | SYSTOLIC BLOOD PRESSURE: 114 MMHG | DIASTOLIC BLOOD PRESSURE: 72 MMHG | WEIGHT: 182.5 LBS | BODY MASS INDEX: 33.58 KG/M2 | HEART RATE: 88 BPM | OXYGEN SATURATION: 97 %

## 2025-01-30 DIAGNOSIS — N92.1 MENORRHAGIA WITH IRREGULAR CYCLE: ICD-10-CM

## 2025-01-30 DIAGNOSIS — K59.02 CONSTIPATION DUE TO OUTLET DYSFUNCTION: ICD-10-CM

## 2025-01-30 DIAGNOSIS — N81.4 PROLAPSE OF UTERUS: Primary | ICD-10-CM

## 2025-01-30 DIAGNOSIS — N81.6 RECTOCELE: ICD-10-CM

## 2025-01-30 ASSESSMENT — PAIN SCALES - GENERAL: PAINLEVEL_OUTOF10: NO PAIN (0)

## 2025-01-30 NOTE — PROGRESS NOTES
CC:  Consult from Donna donovan NP  for urinary incontinence present for 2  years  HPI:  Arabella Desai is a 35 year old female P3 ().   The patient had uterine prolapse after the birth of her second and third child and went to physical therapy with some improvement in her symptoms. However she now had worsening symptoms with pelvic pressure, feels like she is sitting on something, incomplete stool evacuation/constipation. Bladder function satisfactory. She has Mirena IUD in place for contraception and h/o menorrhagia. She is done with childbearing. Denies vaginitis sx. Bleeding is light or absent on IUD.       Frequency: No  Urgency:  No  Stress:  No  Previous work-up:Yes  Contraception: Mirena IUD  Pelvic pain:No  Dyspareunia: No  Incomplete emptying:  :No  Sexually active:  Yes  Pelvic pressure:  Yes  Dysuria: No  Bulging:  Yes    Constipation:  Yes    Past GYN history:        Last PAP smear:  Normal  Hysterectomy: No    Patients records are available and reviewed at today's visit.    Past Medical History:   Diagnosis Date    Benign paroxysmal positional vertigo, bilateral 2022    Chronic cough 2017    Chronic interstitial cystitis 2011    Chronic seasonal allergic rhinitis due to pollen 2017    Duodenal ulcer due to Helicobacter pylori 2020    History of depression 2015    Grief and postpartum. Patient believes pp depression may have been associated with breast feeding aggression from nurses    Other acute gastritis without hemorrhage 2019    Prolapse of uterus 2023    Slow transit constipation 2019    Tobacco use disorder 2012    Vestibular migraine 2022       Past Surgical History:   Procedure Laterality Date    CYSTOSCOPY         Family History   Problem Relation Age of Onset    Cancer Maternal Grandmother         lung    Diabetes Maternal Grandmother     Other - See Comments Paternal Grandmother         brain aneurysm    Coronary Artery  "Disease Paternal Grandmother     Heart Disease Other 70        myocardial infarction       Current Outpatient Medications   Medication Sig Dispense Refill    benzonatate (TESSALON) 200 MG capsule Take 1 capsule (200 mg) by mouth 3 times daily as needed for cough. 21 capsule 0    diazepam (VALIUM) 2 MG tablet TAKE 1 TABLET BY MOUTH EVERY 6 HOURS AS NEEDED FOR DIZZINESS 30 tablet 0    guaiFENesin (MUCINEX) 600 MG 12 hr tablet Take 1-2 tablets (600-1,200 mg) by mouth 2 times daily as needed for congestion. 20 tablet 0    guaiFENesin-codeine (ROBITUSSIN AC) 100-10 MG/5ML solution Take 5-10 mLs by mouth every 4 hours as needed for cough. 118 mL 0    levonorgestrel (MIRENA) 20 MCG/24HR IUD 1 each by Intrauterine route once      ondansetron (ZOFRAN ODT) 4 MG ODT tab Take 1 tablet (4 mg) by mouth every 8 hours as needed for nausea 20 tablet 1       Allergies: Seasonal allergies    ROS:  CONSTITUTIONAL: NEGATIVE for fever, chills, change in weight  RESP: NEGATIVE for significant cough or SOB  CV: NEGATIVE for chest pain, palpitations or peripheral edema  GI: NEGATIVE except for above  : As Above      EXAM:  Blood pressure 114/72, pulse 88, height 1.575 m (5' 2\"), weight 82.8 kg (182 lb 8 oz), SpO2 97%, not currently breastfeeding.   BMI= Body mass index is 33.38 kg/m .  General - pleasant female in no acute distress.  Abdomen - soft, nontender, nondistended, no hepatosplenomegaly.  Pelvic - EG: normal adult female, BUS: within normal limits, Vagina: well rugated, no discharge, Cervix: IUD strings visible, no lesions or CMT, Uterus: firm,  normal sized and nontender, Adnexae: no masses or tenderness.  Prolapse:  Uterus grade 2, cystocele 1, rectocele 2   Urethral hypermobility:  Yes    Rectovaginal - deferred.  Musculoskeletal - no gross deformities or edema  Neurological - normal mental status.    Supine stress test:  negative        ASSESSMENT/PLAN:     Symptomatic pelvic organ prolapse. (Uterine prolapse/rectocele)    "   H/o menorrhagia currently controlled with Mirena IUD    Discussed expectant, pessary, and surgical options with pt.  Risks and benefits of each. The natural h/o POP discussed.  Handouts were reviewed with patient. We discussed surgical treatment with vaginal hysterectomy, Lim's Culdoplasty, posterior colpoperineorrhaphy, cystoscopy.Reviewed goals, risks, alternatives for planned procedure;  Including risk of bleeding, transfusion, infection, damage to nerves, blood vessels, bowel and bladder, blood clots, pneumonia, urinary retention/catheterization, recurrent prolapse, dyspareunia, and other rare or unforseen complications.   Discussed recovery period and expected discomfort.. All questions were answered.  Patient desires to proceed but not until later in the year potentially. .       Patient has my card and phone number if questions or to call when she is ready to schedule. .

## 2025-02-14 PROBLEM — H81.13 BENIGN PAROXYSMAL POSITIONAL VERTIGO, BILATERAL: Status: RESOLVED | Noted: 2022-12-09 | Resolved: 2025-02-14

## 2025-02-14 PROBLEM — R05.3 CHRONIC COUGH: Status: RESOLVED | Noted: 2017-11-08 | Resolved: 2025-02-14

## 2025-02-18 ENCOUNTER — LAB (OUTPATIENT)
Dept: LAB | Facility: OTHER | Age: 35
End: 2025-02-18
Payer: COMMERCIAL

## 2025-02-18 DIAGNOSIS — Z13.0 SCREENING, ANEMIA, DEFICIENCY, IRON: ICD-10-CM

## 2025-02-18 DIAGNOSIS — Z13.220 LIPID SCREENING: ICD-10-CM

## 2025-02-18 DIAGNOSIS — Z11.59 ENCOUNTER FOR HCV SCREENING TEST FOR LOW RISK PATIENT: ICD-10-CM

## 2025-02-18 DIAGNOSIS — Z13.1 SCREENING FOR DIABETES MELLITUS: ICD-10-CM

## 2025-02-18 LAB
ANION GAP SERPL CALCULATED.3IONS-SCNC: 10 MMOL/L (ref 7–15)
BASOPHILS # BLD AUTO: 0.1 10E3/UL (ref 0–0.2)
BASOPHILS NFR BLD AUTO: 1 %
BUN SERPL-MCNC: 8.4 MG/DL (ref 6–20)
CALCIUM SERPL-MCNC: 9.9 MG/DL (ref 8.8–10.4)
CHLORIDE SERPL-SCNC: 102 MMOL/L (ref 98–107)
CHOLEST SERPL-MCNC: 224 MG/DL
CREAT SERPL-MCNC: 0.76 MG/DL (ref 0.51–0.95)
EGFRCR SERPLBLD CKD-EPI 2021: >90 ML/MIN/1.73M2
EOSINOPHIL # BLD AUTO: 0.1 10E3/UL (ref 0–0.7)
EOSINOPHIL NFR BLD AUTO: 2 %
ERYTHROCYTE [DISTWIDTH] IN BLOOD BY AUTOMATED COUNT: 12.4 % (ref 10–15)
FASTING STATUS PATIENT QL REPORTED: YES
FASTING STATUS PATIENT QL REPORTED: YES
GLUCOSE SERPL-MCNC: 82 MG/DL (ref 70–99)
HCO3 SERPL-SCNC: 27 MMOL/L (ref 22–29)
HCT VFR BLD AUTO: 46.4 % (ref 35–47)
HCV AB SERPL QL IA: NONREACTIVE
HDLC SERPL-MCNC: 53 MG/DL
HGB BLD-MCNC: 16.2 G/DL (ref 11.7–15.7)
IMM GRANULOCYTES # BLD: 0 10E3/UL
IMM GRANULOCYTES NFR BLD: 0 %
LDLC SERPL CALC-MCNC: 152 MG/DL
LYMPHOCYTES # BLD AUTO: 1.6 10E3/UL (ref 0.8–5.3)
LYMPHOCYTES NFR BLD AUTO: 22 %
MCH RBC QN AUTO: 31.6 PG (ref 26.5–33)
MCHC RBC AUTO-ENTMCNC: 34.9 G/DL (ref 31.5–36.5)
MCV RBC AUTO: 90 FL (ref 78–100)
MONOCYTES # BLD AUTO: 0.6 10E3/UL (ref 0–1.3)
MONOCYTES NFR BLD AUTO: 8 %
NEUTROPHILS # BLD AUTO: 5 10E3/UL (ref 1.6–8.3)
NEUTROPHILS NFR BLD AUTO: 68 %
NONHDLC SERPL-MCNC: 171 MG/DL
NRBC # BLD AUTO: 0 10E3/UL
NRBC BLD AUTO-RTO: 0 /100
PLATELET # BLD AUTO: 227 10E3/UL (ref 150–450)
POTASSIUM SERPL-SCNC: 3.9 MMOL/L (ref 3.4–5.3)
RBC # BLD AUTO: 5.13 10E6/UL (ref 3.8–5.2)
SODIUM SERPL-SCNC: 139 MMOL/L (ref 135–145)
TRIGL SERPL-MCNC: 95 MG/DL
WBC # BLD AUTO: 7.4 10E3/UL (ref 4–11)

## 2025-02-18 PROCEDURE — 36415 COLL VENOUS BLD VENIPUNCTURE: CPT

## 2025-02-18 PROCEDURE — 86803 HEPATITIS C AB TEST: CPT

## 2025-02-18 PROCEDURE — 85025 COMPLETE CBC W/AUTO DIFF WBC: CPT

## 2025-02-18 PROCEDURE — 80048 BASIC METABOLIC PNL TOTAL CA: CPT

## 2025-02-18 PROCEDURE — 80061 LIPID PANEL: CPT

## 2025-02-24 ENCOUNTER — TELEPHONE (OUTPATIENT)
Dept: FAMILY MEDICINE | Facility: OTHER | Age: 35
End: 2025-02-24

## 2025-02-24 DIAGNOSIS — E66.811 CLASS 1 OBESITY WITHOUT SERIOUS COMORBIDITY WITH BODY MASS INDEX (BMI) OF 32.0 TO 32.9 IN ADULT, UNSPECIFIED OBESITY TYPE: Primary | ICD-10-CM

## 2025-02-24 NOTE — TELEPHONE ENCOUNTER
10:53 AM    Reason for Call: Phone Call    Description: pt called and wants to know if the appointment can be moved up a week as pt prescription  Wegovy is due by the 17 of March. And pt was thinking NP Toribio was going to go up on this medication. Please call. Trued to reschedule but no sooner appointments. Please call pt    Was an appointment offered for this call? No  If yes : Appointment type              Date    Preferred method for responding to this message: Telephone Call  What is your phone number ? [phone]    If we cannot reach you directly, may we leave a detailed response at the number you provided? Yes    Can this message wait until your PCP/provider returns, if available today? Aditi Soto

## 2025-03-08 ENCOUNTER — HOSPITAL ENCOUNTER (EMERGENCY)
Facility: HOSPITAL | Age: 35
Discharge: HOME | End: 2025-03-08
Attending: NURSE PRACTITIONER
Payer: COMMERCIAL

## 2025-03-08 VITALS
SYSTOLIC BLOOD PRESSURE: 116 MMHG | TEMPERATURE: 99 F | HEIGHT: 61 IN | RESPIRATION RATE: 16 BRPM | OXYGEN SATURATION: 98 % | DIASTOLIC BLOOD PRESSURE: 82 MMHG | HEART RATE: 104 BPM | BODY MASS INDEX: 33.79 KG/M2 | WEIGHT: 179 LBS

## 2025-03-08 DIAGNOSIS — B34.9 ACUTE VIRAL SYNDROME: Primary | ICD-10-CM

## 2025-03-08 LAB
FLUAV RNA SPEC QL NAA+PROBE: NEGATIVE
FLUBV RNA RESP QL NAA+PROBE: POSITIVE
RSV RNA SPEC NAA+PROBE: NEGATIVE
SARS-COV-2 RNA RESP QL NAA+PROBE: NEGATIVE

## 2025-03-08 PROCEDURE — 87637 SARSCOV2&INF A&B&RSV AMP PRB: CPT | Performed by: NURSE PRACTITIONER

## 2025-03-08 PROCEDURE — 99213 OFFICE O/P EST LOW 20 MIN: CPT | Performed by: NURSE PRACTITIONER

## 2025-03-08 PROCEDURE — G0463 HOSPITAL OUTPT CLINIC VISIT: HCPCS

## 2025-03-08 ASSESSMENT — ENCOUNTER SYMPTOMS
ABDOMINAL PAIN: 0
NECK STIFFNESS: 0
RHINORRHEA: 1
PSYCHIATRIC NEGATIVE: 1
DIARRHEA: 1
CHILLS: 0
VOMITING: 0
FEVER: 1
SORE THROAT: 0
HEADACHES: 0
COUGH: 1
SHORTNESS OF BREATH: 0
EYE DISCHARGE: 0
NECK PAIN: 0
MYALGIAS: 0
EYE REDNESS: 0
NAUSEA: 0

## 2025-03-08 ASSESSMENT — ACTIVITIES OF DAILY LIVING (ADL): ADLS_ACUITY_SCORE: 45

## 2025-03-08 ASSESSMENT — COLUMBIA-SUICIDE SEVERITY RATING SCALE - C-SSRS
2. HAVE YOU ACTUALLY HAD ANY THOUGHTS OF KILLING YOURSELF IN THE PAST MONTH?: NO
1. IN THE PAST MONTH, HAVE YOU WISHED YOU WERE DEAD OR WISHED YOU COULD GO TO SLEEP AND NOT WAKE UP?: NO
6. HAVE YOU EVER DONE ANYTHING, STARTED TO DO ANYTHING, OR PREPARED TO DO ANYTHING TO END YOUR LIFE?: NO

## 2025-03-08 NOTE — ED PROVIDER NOTES
History     Chief Complaint   Patient presents with    Cough    Fever     HPI  Arabella Desai is a 35 year old female who presents to urgent care today ambulatory with complaints of fever, congestion, rhinorrhea and cough that started 3 days ago.  Diarrhea on day one, resolved.  Other family members in the household sick with similar symptoms.  No asthma.  No abdominal pain or vomiting.  No rashes.  Staying hydrated.  Wants COVID, influenza and RSV testing.  Has been alternating Tylenol and ibuprofen as needed.  No other concerns.    Allergies:  Allergies   Allergen Reactions    Seasonal Allergies        Problem List:    Patient Active Problem List    Diagnosis Date Noted    Prolapse of uterus 01/23/2023     Priority: Medium    Vestibular migraine 12/09/2022     Priority: Medium    Duodenal ulcer due to Helicobacter pylori 06/01/2020     Priority: Medium    Slow transit constipation 06/24/2019     Priority: Medium    Chronic seasonal allergic rhinitis due to pollen 08/08/2017     Priority: Medium    Tobacco use disorder 05/17/2012     Priority: Medium     Pneumovax done 7/10/17          Past Medical History:    Past Medical History:   Diagnosis Date    Benign paroxysmal positional vertigo, bilateral 12/09/2022    Chronic cough 11/08/2017    Chronic interstitial cystitis 08/12/2011    Chronic seasonal allergic rhinitis due to pollen 08/08/2017    Duodenal ulcer due to Helicobacter pylori 06/01/2020    History of depression 12/30/2015    Other acute gastritis without hemorrhage 06/24/2019    Prolapse of uterus 01/23/2023    Slow transit constipation 06/24/2019    Tobacco use disorder 05/17/2012    Vestibular migraine 12/09/2022       Past Surgical History:    Past Surgical History:   Procedure Laterality Date    CYSTOSCOPY  2011    EXTRACTION(S) DENTAL      TONSILLECTOMY         Family History:    Family History   Problem Relation Age of Onset    Diverticulitis Mother     Cholelithiasis Mother     Prostate Cancer  "Father     Bladder Cancer Father         works with fuel, also smoker    Cancer Maternal Grandmother         lung    Diabetes Maternal Grandmother     Other - See Comments Paternal Grandmother         brain aneurysm    Coronary Artery Disease Paternal Grandmother     Heart Disease Other 70        myocardial infarction    Breast Cancer No family hx of        Social History:  Marital Status:   [2]  Social History     Tobacco Use    Smoking status: Every Day     Current packs/day: 0.50     Average packs/day: 0.5 packs/day for 14.2 years (7.1 ttl pk-yrs)     Types: Cigarettes     Start date: 1/1/2011     Passive exposure: Current    Smokeless tobacco: Never   Vaping Use    Vaping status: Never Used   Substance Use Topics    Alcohol use: Yes     Comment: once a week    Drug use: No        Medications:    ondansetron (ZOFRAN ODT) 4 MG ODT tab  semaglutide-weight management (WEGOVY) 0.25 MG/0.5ML pen  diazepam (VALIUM) 2 MG tablet  levonorgestrel (MIRENA) 20 MCG/24HR IUD  Semaglutide-Weight Management (WEGOVY) 0.5 MG/0.5ML pen  valACYclovir (VALTREX) 1000 mg tablet      Review of Systems   Constitutional:  Positive for fever. Negative for chills.   HENT:  Positive for congestion and rhinorrhea. Negative for ear pain and sore throat.    Eyes:  Negative for discharge and redness.   Respiratory:  Positive for cough. Negative for shortness of breath.    Cardiovascular:  Negative for chest pain.   Gastrointestinal:  Positive for diarrhea (resolved). Negative for abdominal pain, nausea and vomiting.   Genitourinary:  Negative for decreased urine volume.   Musculoskeletal:  Negative for gait problem, myalgias, neck pain and neck stiffness.   Skin:  Negative for rash.   Neurological:  Negative for headaches.   Psychiatric/Behavioral: Negative.       Physical Exam   BP: 116/82  Pulse: 104  Temp: 99  F (37.2  C)  Resp: 16  Height: 154.9 cm (5' 1\")  Weight: 81.2 kg (179 lb)  SpO2: 98 %  AP: 100    Physical Exam  Vitals and " nursing note reviewed.   Constitutional:       General: She is not in acute distress.     Appearance: Normal appearance. She is not ill-appearing or toxic-appearing.   HENT:      Right Ear: Tympanic membrane, ear canal and external ear normal.      Left Ear: Tympanic membrane, ear canal and external ear normal.      Nose: Congestion and rhinorrhea present.      Mouth/Throat:      Mouth: Mucous membranes are moist.      Pharynx: Oropharynx is clear. No oropharyngeal exudate or posterior oropharyngeal erythema.   Cardiovascular:      Rate and Rhythm: Normal rate and regular rhythm.      Pulses: Normal pulses.      Heart sounds: Normal heart sounds.   Pulmonary:      Effort: Pulmonary effort is normal.      Breath sounds: Normal breath sounds.   Neurological:      Mental Status: She is alert.   Psychiatric:         Mood and Affect: Mood normal.       ED Course     Procedures    No results found for this or any previous visit (from the past 24 hours).    Medications - No data to display    Assessments & Plan (with Medical Decision Making)     I have reviewed the nursing notes.    I have reviewed the findings, diagnosis, plan and need for follow up with the patient.  (B34.9) Acute viral syndrome  (primary encounter diagnosis)  Plan:   Patient ambulatory with a nontoxic appearance.  Lungs clear throughout.  No asthma.  No signs of otitis media or strep.  No abdominal pain or vomiting, diarrhea resolved.  COVID, influenza and RSV test pending.  Symptomatic recommendations provided.  Alternate Tylenol and ibuprofen as needed for pain or fever.  Push fluids to stay hydrated.  Warm salt water gargles or honey as needed for sore throat.  Over-the-counter Flonase as needed for congestion.  Follow-up with primary care provider or return to urgent care/ED with any worsening in condition or additional concerns.  Patient in agreement treatment plan.    Discharge Medication List as of 3/8/2025 12:17 PM        Final diagnoses:    Acute viral syndrome     3/8/2025   [facility]       Mariaelena Evans NP  03/08/25 122

## 2025-03-08 NOTE — ED TRIAGE NOTES
Pt presents with cough, congestion and fevers x3 days. Highest temp 103.2. Diarrhea x1 day. Pt denied ear or throat pain, and n/v. Pt has been taking ibuprofen and tylenol.

## 2025-03-19 NOTE — PROGRESS NOTES
"  Assessment & Plan     (E66.811,  Z68.32) Class 1 obesity without serious comorbidity with body mass index (BMI) of 32.0 to 32.9 in adult, unspecified obesity type  (primary encounter diagnosis)  (Z71.3) Dietary counseling and surveillance  Comment: doing well, weight trending down, tolerating Wegovy 0.25 mg  Plan: Wegovy 0.5 mg was ordered. She is aware of the side effects. Healthy eating and daily exercise was encouraged.       The longitudinal plan of care for the diagnosis(es)/condition(s) as documented were addressed during this visit. Due to the added complexity in care, I will continue to support Arabella in the subsequent management and with ongoing continuity of care.    Subjective   Arabella is a 35 year old, presenting for the following health issues:  Weight Management    HPI      Obesity; last seen on 2/14/25. Was struggling to lose weight. Weight was 180 pounds. Wegovy was started. Currently tolerating 0.5 mg. Weight today is 173.7 lbs.    Feels really good. Energy levels have increased.     Exercising: walking on her treadmill    Diet Recall:  Breakfast: high protein shakes  Lunch: high protein bar  Dinner: meat and veggies  Snacks: apples    Fried good makes her sick.     TSH was normal in 2/2023.     Drinking 100 ounces water daily.         Review of Systems  Constitutional, HEENT, cardiovascular, pulmonary, gi and gu systems are negative, except as otherwise noted.      Objective    /72   Pulse 102   Temp 97.7  F (36.5  C) (Tympanic)   Resp 16   Ht 1.549 m (5' 1\")   Wt 78.8 kg (173 lb 11.2 oz)   SpO2 98%   BMI 32.82 kg/m    Body mass index is 32.82 kg/m .  Physical Exam   GENERAL: alert and no distress, obese  EYES: Eyes grossly normal to inspection, PERRL and conjunctivae and sclerae normal  HENT: ear canals and TM's normal, nose and mouth without ulcers or lesions  NECK: no adenopathy, no asymmetry, masses, or scars  RESP: lungs clear to auscultation - no rales, rhonchi or " wheezes  CV: regular rate and rhythm, no murmur, click or rub, no peripheral edema  ABDOMEN: soft, nontender, no hepatosplenomegaly, no masses and bowel sounds normal  MS: no gross musculoskeletal defects noted, no edema  NEURO: Normal strength and tone, mentation intact and speech normal  PSYCH: mentation appears normal, affect normal/bright          Signed Electronically by: Tyesha Rooney, NP

## 2025-03-20 ENCOUNTER — OFFICE VISIT (OUTPATIENT)
Dept: FAMILY MEDICINE | Facility: OTHER | Age: 35
End: 2025-03-20
Attending: NURSE PRACTITIONER
Payer: COMMERCIAL

## 2025-03-20 VITALS
SYSTOLIC BLOOD PRESSURE: 108 MMHG | BODY MASS INDEX: 32.8 KG/M2 | OXYGEN SATURATION: 98 % | HEIGHT: 61 IN | HEART RATE: 102 BPM | DIASTOLIC BLOOD PRESSURE: 72 MMHG | WEIGHT: 173.7 LBS | RESPIRATION RATE: 16 BRPM | TEMPERATURE: 97.7 F

## 2025-03-20 DIAGNOSIS — Z71.3 DIETARY COUNSELING AND SURVEILLANCE: ICD-10-CM

## 2025-03-20 DIAGNOSIS — E66.811 CLASS 1 OBESITY WITHOUT SERIOUS COMORBIDITY WITH BODY MASS INDEX (BMI) OF 32.0 TO 32.9 IN ADULT, UNSPECIFIED OBESITY TYPE: Primary | ICD-10-CM

## 2025-03-20 ASSESSMENT — PAIN SCALES - GENERAL: PAINLEVEL_OUTOF10: NO PAIN (0)

## 2025-04-15 DIAGNOSIS — E66.811 CLASS 1 OBESITY WITHOUT SERIOUS COMORBIDITY WITH BODY MASS INDEX (BMI) OF 32.0 TO 32.9 IN ADULT, UNSPECIFIED OBESITY TYPE: ICD-10-CM

## 2025-04-15 RX ORDER — SEMAGLUTIDE 0.5 MG/.5ML
INJECTION, SOLUTION SUBCUTANEOUS
Qty: 4 ML | Refills: 0 | Status: SHIPPED | OUTPATIENT
Start: 2025-04-15

## 2025-04-15 NOTE — TELEPHONE ENCOUNTER
Wegovy       Last Written Prescription Date:  2/26/2025  Last Fill Quantity: 2mL,   # refills: 0  Last Office Visit: 3/20/2025  Future Office visit:    Next 5 appointments (look out 90 days)      Apr 23, 2025 2:30 PM  (Arrive by 2:15 PM)  Provider Visit with Tyesha Rooney NP  [facility] ([facility] ) [address]  [place] [address]  [phone]

## 2025-04-15 NOTE — TELEPHONE ENCOUNTER
Spoke with patient. Patient denies any GI side effects. Patient would like to stay on current dosage. 0.5 MG pended.

## 2025-04-22 NOTE — PROGRESS NOTES
"  Assessment & Plan     (E66.811,  Z68.32) Class 1 obesity without serious comorbidity with body mass index (BMI) of 32.0 to 32.9 in adult, unspecified obesity type  (primary encounter diagnosis)  (Z71.3) Dietary counseling and surveillance  Comment: weight trending down, but it has slowed; tolerating Wegovy 0.5 mg, no side effects  Plan: Will increase the Wegovy to 1 mg and reassess in 4 weeks.     Total weight loss 12 pounds since 1/30/25.     The longitudinal plan of care for the diagnosis(es)/condition(s) as documented were addressed during this visit. Due to the added complexity in care, I will continue to support Arabella in the subsequent management and with ongoing continuity of care.      Subjective   Arabella is a 35 year old, presenting for the following health issues:  weight follow up    HPI      Obesity; initially seen on 2/14/25. Was struggling to lose weight. Weight was 180 pounds. Wegovy was started.     Last seen on 3/20/25. Weight was 173 pounds. Currently tolerating 0.5 mg Wegovy. No nausea or vomiting. Weight today is 170 lb    Feels really good. Energy levels have increased.      Exercising: walking on her treadmill     Diet Recall:  Breakfast: high protein shakes  Lunch: high protein bar  Dinner: meat and veggies, tacos without the sheel  Snacks: apples, chips     Fried good makes her sick.      TSH was normal in 2/2023.      Drinking 100 ounces water daily.       Review of Systems  Constitutional, HEENT, cardiovascular, pulmonary, gi and gu systems are negative, except as otherwise noted.      Objective    /70 (BP Location: Left arm, Patient Position: Sitting, Cuff Size: Adult Large)   Pulse 110   Temp 98.5  F (36.9  C) (Tympanic)   Resp 20   Ht 1.549 m (5' 1\")   Wt 77.1 kg (170 lb)   SpO2 98%   BMI 32.12 kg/m    Body mass index is 32.12 kg/m .  Physical Exam   GENERAL: alert and no distress  NEURO: Normal strength and tone, mentation intact and speech normal  PSYCH: mentation " appears normal, affect normal/bright            Signed Electronically by: Tyesha Rooney, NP

## 2025-04-23 ENCOUNTER — OFFICE VISIT (OUTPATIENT)
Dept: FAMILY MEDICINE | Facility: OTHER | Age: 35
End: 2025-04-23
Attending: NURSE PRACTITIONER
Payer: COMMERCIAL

## 2025-04-23 VITALS
WEIGHT: 170 LBS | BODY MASS INDEX: 32.1 KG/M2 | HEIGHT: 61 IN | DIASTOLIC BLOOD PRESSURE: 70 MMHG | HEART RATE: 110 BPM | TEMPERATURE: 98.5 F | SYSTOLIC BLOOD PRESSURE: 110 MMHG | OXYGEN SATURATION: 98 % | RESPIRATION RATE: 20 BRPM

## 2025-04-23 DIAGNOSIS — E66.811 CLASS 1 OBESITY WITHOUT SERIOUS COMORBIDITY WITH BODY MASS INDEX (BMI) OF 32.0 TO 32.9 IN ADULT, UNSPECIFIED OBESITY TYPE: Primary | ICD-10-CM

## 2025-04-23 DIAGNOSIS — Z71.3 DIETARY COUNSELING AND SURVEILLANCE: ICD-10-CM

## 2025-04-23 ASSESSMENT — PAIN SCALES - GENERAL: PAINLEVEL_OUTOF10: NO PAIN (0)

## 2025-07-20 NOTE — PROGRESS NOTES
Assessment & Plan     (E66.811,  Z68.32) Class 1 obesity without serious comorbidity with body mass index (BMI) of 32.0 to 32.9 in adult, unspecified obesity type  (primary encounter diagnosis)  (Z71.3) Dietary counseling and surveillance  Comment: weight down another 4 pounds  Plan: Tolerating Wegovy 1 mg. Will continue. Total weight loss 23 pounds. Feeling great. Will continue current dose and reassess in 3 months.     (K21.00) Gastroesophageal reflux disease with esophagitis without hemorrhage  Comment: controlled with OTC omeprazole  Plan: Continue with omeprazole.     (B37.0) Thrush  Comment: mild on tongue  Plan: Nystatin ordered.       The longitudinal plan of care for the diagnosis(es)/condition(s) as documented were addressed during this visit. Due to the added complexity in care, I will continue to support Arabella in the subsequent management and with ongoing continuity of care.    Subjective   Arabella is a 35 year old, presenting for the following health issues:  Weight Management    HPI      Obesity; initially seen on 2/14/25. Was struggling to lose weight. Weight was 180 pounds. Wegovy was started.      Last seen on 5/23/25. Weight was 163 pounds. Currently tolerating 1 mg Wegovy. Some nausea, no vomiting. Nausea is tolerable. Worse when brushing teeth. Weight today is 159 lbs.    Started having epigastric pain. Started omeprazole and it is working well.      Goal weight is around 150 pounds.      Feels really good. Energy levels have increased.      Exercising: walking on her treadmill     Diet Recall:  Breakfast: coffee, occasional protein bar  Lunch: high protein bar, soup  Dinner: meat and veggies, tacos without the sheel  Snacks: apples, chips     Fried good makes her sick.      TSH was normal in 2/2023.      Drinking 100 ounces water daily.     Tongue with mild white coating. No pain. No sore throat. H/O thrush in her esophagus.    Review of Systems  Constitutional, HEENT, cardiovascular,  "pulmonary, gi and gu systems are negative, except as otherwise noted.      Objective    /70   Pulse 90   Temp 97.7  F (36.5  C) (Tympanic)   Resp 18   Ht 1.549 m (5' 1\")   Wt 72.1 kg (159 lb)   SpO2 98%   BMI 30.04 kg/m    Body mass index is 30.04 kg/m .  Physical Exam   GENERAL: alert and no distress, overweight  Tongue: mild white coating  NEURO: Normal strength and tone, mentation intact and speech normal  PSYCH: mentation appears normal, affect normal/bright            Signed Electronically by: Tyesha Rooney NP    "

## 2025-07-21 ENCOUNTER — OFFICE VISIT (OUTPATIENT)
Dept: FAMILY MEDICINE | Facility: OTHER | Age: 35
End: 2025-07-21
Attending: NURSE PRACTITIONER
Payer: COMMERCIAL

## 2025-07-21 VITALS
DIASTOLIC BLOOD PRESSURE: 70 MMHG | HEIGHT: 61 IN | TEMPERATURE: 97.7 F | HEART RATE: 90 BPM | RESPIRATION RATE: 18 BRPM | SYSTOLIC BLOOD PRESSURE: 110 MMHG | WEIGHT: 159 LBS | OXYGEN SATURATION: 98 % | BODY MASS INDEX: 30.02 KG/M2

## 2025-07-21 DIAGNOSIS — B37.0 THRUSH: ICD-10-CM

## 2025-07-21 DIAGNOSIS — Z71.3 DIETARY COUNSELING AND SURVEILLANCE: ICD-10-CM

## 2025-07-21 DIAGNOSIS — K21.00 GASTROESOPHAGEAL REFLUX DISEASE WITH ESOPHAGITIS WITHOUT HEMORRHAGE: ICD-10-CM

## 2025-07-21 DIAGNOSIS — E66.811 CLASS 1 OBESITY WITHOUT SERIOUS COMORBIDITY WITH BODY MASS INDEX (BMI) OF 32.0 TO 32.9 IN ADULT, UNSPECIFIED OBESITY TYPE: Primary | ICD-10-CM

## 2025-07-21 RX ORDER — NYSTATIN 100000 [USP'U]/ML
500000 SUSPENSION ORAL 4 TIMES DAILY
Qty: 60 ML | Refills: 0 | Status: SHIPPED | OUTPATIENT
Start: 2025-07-21

## 2025-07-21 RX ORDER — TRETINOIN 0.25 MG/G
CREAM TOPICAL
COMMUNITY
Start: 2025-03-19

## 2025-07-21 ASSESSMENT — ANXIETY QUESTIONNAIRES
7. FEELING AFRAID AS IF SOMETHING AWFUL MIGHT HAPPEN: NOT AT ALL
8. IF YOU CHECKED OFF ANY PROBLEMS, HOW DIFFICULT HAVE THESE MADE IT FOR YOU TO DO YOUR WORK, TAKE CARE OF THINGS AT HOME, OR GET ALONG WITH OTHER PEOPLE?: NOT DIFFICULT AT ALL
IF YOU CHECKED OFF ANY PROBLEMS ON THIS QUESTIONNAIRE, HOW DIFFICULT HAVE THESE PROBLEMS MADE IT FOR YOU TO DO YOUR WORK, TAKE CARE OF THINGS AT HOME, OR GET ALONG WITH OTHER PEOPLE: NOT DIFFICULT AT ALL
GAD7 TOTAL SCORE: 4
3. WORRYING TOO MUCH ABOUT DIFFERENT THINGS: NOT AT ALL
2. NOT BEING ABLE TO STOP OR CONTROL WORRYING: SEVERAL DAYS
4. TROUBLE RELAXING: SEVERAL DAYS
5. BEING SO RESTLESS THAT IT IS HARD TO SIT STILL: SEVERAL DAYS
GAD7 TOTAL SCORE: 4
1. FEELING NERVOUS, ANXIOUS, OR ON EDGE: SEVERAL DAYS
GAD7 TOTAL SCORE: 4
6. BECOMING EASILY ANNOYED OR IRRITABLE: NOT AT ALL
7. FEELING AFRAID AS IF SOMETHING AWFUL MIGHT HAPPEN: NOT AT ALL

## 2025-07-21 ASSESSMENT — PATIENT HEALTH QUESTIONNAIRE - PHQ9
10. IF YOU CHECKED OFF ANY PROBLEMS, HOW DIFFICULT HAVE THESE PROBLEMS MADE IT FOR YOU TO DO YOUR WORK, TAKE CARE OF THINGS AT HOME, OR GET ALONG WITH OTHER PEOPLE: NOT DIFFICULT AT ALL
SUM OF ALL RESPONSES TO PHQ QUESTIONS 1-9: 2
SUM OF ALL RESPONSES TO PHQ QUESTIONS 1-9: 2

## 2025-07-21 ASSESSMENT — PAIN SCALES - GENERAL: PAINLEVEL_OUTOF10: NO PAIN (0)
